# Patient Record
Sex: MALE | Race: WHITE | NOT HISPANIC OR LATINO
[De-identification: names, ages, dates, MRNs, and addresses within clinical notes are randomized per-mention and may not be internally consistent; named-entity substitution may affect disease eponyms.]

---

## 2017-03-19 ENCOUNTER — RESULT REVIEW (OUTPATIENT)
Age: 67
End: 2017-03-19

## 2017-11-27 ENCOUNTER — APPOINTMENT (OUTPATIENT)
Dept: UROLOGY | Facility: CLINIC | Age: 67
End: 2017-11-27
Payer: MEDICARE

## 2017-11-27 VITALS
HEIGHT: 78 IN | SYSTOLIC BLOOD PRESSURE: 146 MMHG | BODY MASS INDEX: 26.03 KG/M2 | WEIGHT: 225 LBS | DIASTOLIC BLOOD PRESSURE: 71 MMHG | OXYGEN SATURATION: 98 % | TEMPERATURE: 98.2 F | HEART RATE: 64 BPM

## 2017-11-27 PROCEDURE — 99213 OFFICE O/P EST LOW 20 MIN: CPT

## 2018-03-09 PROBLEM — Z80.0 FAMILY HISTORY OF MALIGNANT NEOPLASM OF STOMACH: Status: ACTIVE | Noted: 2018-03-09

## 2018-03-09 PROBLEM — E78.5 HYPERLIPIDEMIA: Status: ACTIVE | Noted: 2018-03-09

## 2018-03-09 PROBLEM — I10 HTN (HYPERTENSION): Status: ACTIVE | Noted: 2018-03-09

## 2018-03-09 PROBLEM — Z87.891 FORMER SMOKER: Status: ACTIVE | Noted: 2018-03-09

## 2018-03-09 PROBLEM — E11.9 DIABETES: Status: ACTIVE | Noted: 2018-03-09

## 2018-03-12 ENCOUNTER — APPOINTMENT (OUTPATIENT)
Dept: HEMATOLOGY ONCOLOGY | Facility: CLINIC | Age: 68
End: 2018-03-12

## 2018-03-12 ENCOUNTER — APPOINTMENT (OUTPATIENT)
Dept: HEMATOLOGY ONCOLOGY | Facility: CLINIC | Age: 68
End: 2018-03-12
Payer: MEDICARE

## 2018-03-12 DIAGNOSIS — E11.9 TYPE 2 DIABETES MELLITUS W/OUT COMPLICATIONS: ICD-10-CM

## 2018-03-12 DIAGNOSIS — Z87.891 PERSONAL HISTORY OF NICOTINE DEPENDENCE: ICD-10-CM

## 2018-03-12 DIAGNOSIS — I10 ESSENTIAL (PRIMARY) HYPERTENSION: ICD-10-CM

## 2018-03-12 DIAGNOSIS — Z80.0 FAMILY HISTORY OF MALIGNANT NEOPLASM OF DIGESTIVE ORGANS: ICD-10-CM

## 2018-03-12 DIAGNOSIS — E78.5 HYPERLIPIDEMIA, UNSPECIFIED: ICD-10-CM

## 2018-04-02 ENCOUNTER — APPOINTMENT (OUTPATIENT)
Dept: HEMATOLOGY ONCOLOGY | Facility: CLINIC | Age: 68
End: 2018-04-02
Payer: MEDICARE

## 2018-04-02 VITALS
HEIGHT: 78 IN | SYSTOLIC BLOOD PRESSURE: 151 MMHG | BODY MASS INDEX: 26.27 KG/M2 | WEIGHT: 227 LBS | HEART RATE: 74 BPM | TEMPERATURE: 98.2 F | DIASTOLIC BLOOD PRESSURE: 80 MMHG

## 2018-04-02 DIAGNOSIS — Z82.49 FAMILY HISTORY OF ISCHEMIC HEART DISEASE AND OTHER DISEASES OF THE CIRCULATORY SYSTEM: ICD-10-CM

## 2018-04-02 PROCEDURE — 99203 OFFICE O/P NEW LOW 30 MIN: CPT

## 2018-04-02 RX ORDER — ATORVASTATIN CALCIUM 20 MG/1
20 TABLET, FILM COATED ORAL
Refills: 0 | Status: ACTIVE | COMMUNITY
Start: 2018-04-02

## 2018-04-17 ENCOUNTER — APPOINTMENT (OUTPATIENT)
Dept: HEMATOLOGY ONCOLOGY | Facility: CLINIC | Age: 68
End: 2018-04-17
Payer: MEDICARE

## 2018-04-17 VITALS
BODY MASS INDEX: 26.38 KG/M2 | TEMPERATURE: 98.2 F | DIASTOLIC BLOOD PRESSURE: 83 MMHG | SYSTOLIC BLOOD PRESSURE: 133 MMHG | WEIGHT: 228 LBS | HEART RATE: 71 BPM | HEIGHT: 78 IN

## 2018-04-17 PROCEDURE — 99214 OFFICE O/P EST MOD 30 MIN: CPT

## 2018-04-30 ENCOUNTER — FORM ENCOUNTER (OUTPATIENT)
Age: 68
End: 2018-04-30

## 2018-05-01 ENCOUNTER — APPOINTMENT (OUTPATIENT)
Dept: ULTRASOUND IMAGING | Facility: CLINIC | Age: 68
End: 2018-05-01
Payer: MEDICARE

## 2018-05-01 ENCOUNTER — OUTPATIENT (OUTPATIENT)
Dept: OUTPATIENT SERVICES | Facility: HOSPITAL | Age: 68
LOS: 1 days | End: 2018-05-01
Payer: MEDICARE

## 2018-05-01 DIAGNOSIS — Z00.8 ENCOUNTER FOR OTHER GENERAL EXAMINATION: ICD-10-CM

## 2018-05-01 PROCEDURE — 93971 EXTREMITY STUDY: CPT

## 2018-05-01 PROCEDURE — 93971 EXTREMITY STUDY: CPT | Mod: 26,LT

## 2018-06-19 ENCOUNTER — LABORATORY RESULT (OUTPATIENT)
Age: 68
End: 2018-06-19

## 2018-06-19 ENCOUNTER — APPOINTMENT (OUTPATIENT)
Dept: HEMATOLOGY ONCOLOGY | Facility: CLINIC | Age: 68
End: 2018-06-19
Payer: MEDICARE

## 2018-06-19 VITALS
HEART RATE: 95 BPM | TEMPERATURE: 98 F | HEIGHT: 78 IN | WEIGHT: 222 LBS | SYSTOLIC BLOOD PRESSURE: 134 MMHG | DIASTOLIC BLOOD PRESSURE: 76 MMHG | BODY MASS INDEX: 25.69 KG/M2

## 2018-06-19 PROCEDURE — 36415 COLL VENOUS BLD VENIPUNCTURE: CPT

## 2018-06-20 LAB
DEPRECATED D DIMER PPP IA-ACNC: 304 NG/ML DDU
PROT S AG ACT/NOR PPP IA: 92 %

## 2018-06-21 LAB
B2 GLYCOPROT1 IGA SERPL IA-ACNC: <5 SAU
B2 GLYCOPROT1 IGG SER-ACNC: <5 SGU
CARDIOLIPIN IGM SER-MCNC: <5 GPL
CARDIOLIPIN IGM SER-MCNC: <5 MPL
PROT C AG PPP IA-ACNC: 62 %
PS IGA SER QL: <20 U/ML
PS IGG SER QL: <10 U/ML
PS IGM SER QL: <25 U/ML

## 2018-06-22 LAB — B2 GLYCOPROT1 IGM SER-ACNC: <5 SMU

## 2018-06-26 ENCOUNTER — APPOINTMENT (OUTPATIENT)
Dept: HEMATOLOGY ONCOLOGY | Facility: CLINIC | Age: 68
End: 2018-06-26
Payer: MEDICARE

## 2018-06-26 VITALS
HEIGHT: 78 IN | HEART RATE: 71 BPM | TEMPERATURE: 97.5 F | BODY MASS INDEX: 25.8 KG/M2 | SYSTOLIC BLOOD PRESSURE: 146 MMHG | DIASTOLIC BLOOD PRESSURE: 78 MMHG | WEIGHT: 223 LBS

## 2018-06-26 LAB — FACT X AG PPP IA-ACNC: 68 %

## 2018-06-26 PROCEDURE — 99215 OFFICE O/P EST HI 40 MIN: CPT

## 2018-06-26 RX ORDER — WARFARIN 6 MG/1
6 TABLET ORAL DAILY
Refills: 0 | Status: DISCONTINUED | COMMUNITY
Start: 2018-04-02 | End: 2018-06-26

## 2018-06-26 RX ORDER — DILTIAZEM HYDROCHLORIDE 300 MG/1
300 CAPSULE, COATED, EXTENDED RELEASE ORAL
Refills: 0 | Status: DISCONTINUED | COMMUNITY
Start: 2018-04-02 | End: 2018-06-26

## 2018-06-26 RX ORDER — AZITHROMYCIN 250 MG/1
250 TABLET, FILM COATED ORAL
Qty: 5 | Refills: 0 | Status: DISCONTINUED | COMMUNITY
Start: 2018-06-04

## 2018-06-26 RX ORDER — METFORMIN HYDROCHLORIDE 500 MG/1
500 TABLET, COATED ORAL
Refills: 0 | Status: DISCONTINUED | COMMUNITY
Start: 2018-04-02 | End: 2018-06-26

## 2018-11-19 ENCOUNTER — APPOINTMENT (OUTPATIENT)
Dept: UROLOGY | Facility: CLINIC | Age: 68
End: 2018-11-19
Payer: MEDICARE

## 2018-11-19 VITALS
BODY MASS INDEX: 25.45 KG/M2 | SYSTOLIC BLOOD PRESSURE: 127 MMHG | WEIGHT: 220 LBS | HEART RATE: 82 BPM | DIASTOLIC BLOOD PRESSURE: 77 MMHG | HEIGHT: 78 IN | TEMPERATURE: 98.3 F

## 2018-11-19 PROCEDURE — 99212 OFFICE O/P EST SF 10 MIN: CPT

## 2018-11-19 PROCEDURE — 99213 OFFICE O/P EST LOW 20 MIN: CPT

## 2018-11-27 ENCOUNTER — OUTPATIENT (OUTPATIENT)
Dept: OUTPATIENT SERVICES | Facility: HOSPITAL | Age: 68
LOS: 1 days | End: 2018-11-27

## 2018-11-27 DIAGNOSIS — Z00.8 ENCOUNTER FOR OTHER GENERAL EXAMINATION: ICD-10-CM

## 2018-11-29 ENCOUNTER — FORM ENCOUNTER (OUTPATIENT)
Age: 68
End: 2018-11-29

## 2018-11-30 ENCOUNTER — OUTPATIENT (OUTPATIENT)
Dept: OUTPATIENT SERVICES | Facility: HOSPITAL | Age: 68
LOS: 1 days | End: 2018-11-30
Payer: MEDICARE

## 2018-11-30 ENCOUNTER — APPOINTMENT (OUTPATIENT)
Dept: MRI IMAGING | Facility: CLINIC | Age: 68
End: 2018-11-30
Payer: MEDICARE

## 2018-11-30 DIAGNOSIS — Z00.8 ENCOUNTER FOR OTHER GENERAL EXAMINATION: ICD-10-CM

## 2018-11-30 PROCEDURE — 72197 MRI PELVIS W/O & W/DYE: CPT

## 2018-11-30 PROCEDURE — 74183 MRI ABD W/O CNTR FLWD CNTR: CPT | Mod: 26

## 2018-11-30 PROCEDURE — A9585: CPT

## 2018-11-30 PROCEDURE — 72197 MRI PELVIS W/O & W/DYE: CPT | Mod: 26

## 2018-11-30 PROCEDURE — 74183 MRI ABD W/O CNTR FLWD CNTR: CPT

## 2019-01-14 ENCOUNTER — APPOINTMENT (OUTPATIENT)
Dept: HEMATOLOGY ONCOLOGY | Facility: CLINIC | Age: 69
End: 2019-01-14
Payer: COMMERCIAL

## 2019-01-14 VITALS
DIASTOLIC BLOOD PRESSURE: 81 MMHG | BODY MASS INDEX: 25.22 KG/M2 | HEIGHT: 78 IN | SYSTOLIC BLOOD PRESSURE: 146 MMHG | HEART RATE: 73 BPM | TEMPERATURE: 97.6 F | WEIGHT: 218 LBS

## 2019-01-14 PROCEDURE — 99214 OFFICE O/P EST MOD 30 MIN: CPT

## 2019-01-14 RX ORDER — ELECTROLYTES/DEXTROSE
SOLUTION, ORAL ORAL DAILY
Refills: 0 | Status: ACTIVE | COMMUNITY
Start: 2019-01-14

## 2019-01-14 NOTE — ASSESSMENT
[FreeTextEntry1] : 68 y/o male with history of LLE DVT in 2003 in setting of flare up of UC/Crohn and  car travel. Family hx of PE in father ( details not available). \par \par Persistently low protein C and persistent elevated D dimer.\par Currently on indefinite AC - low dose Xarelto per [facility] Choice trial.\par No evidence of hepatitis or any other liver injury due to Xarelto.\par \par Long discussion with patient and his wife.\par Continue Xarelto.\par He has follow up with Dr Greene in February. \par \par RV in one year. \par \par

## 2019-01-14 NOTE — REASON FOR VISIT
[Follow-Up Visit] : a follow-up visit for [FreeTextEntry2] : hx of DVT,on indefinite anticoagulation with XArelto ( lower dose 10 mg)

## 2019-01-14 NOTE — HISTORY OF PRESENT ILLNESS
[de-identified] : Patient was diagnosed with LLE DVT in . He remains on AC with Coumadin since then. His INR is stable on stable dose of Coumadin 6.5 mg daily and he had no bleeding complications. He was referred here to evaluate if he should stop anticoagulation. \par At the time of diagnosis of DVT he had flare up of his ulcerative colitis, he was on high dose prednisone, developed  new onset diabetes, also had several hours car trip. He had thrombophilia w/up done - results are not available now ( old records not available) . Extended/possibly indefinite anticoagulation was recommended. Never had recurrence of thrombosis.\par He has chronic LLE swelling- mild, he uses compression stocking during plane flight. His father  of PE at age 74 ( unclear if unprovoked). \par \par His ulcerative colitis/ Crohn disease.\par \par Stopped Coumadin in 2018.\par Evaluated here in 2018. \par Persistently decreased  Protein C - after coumadin was discontinued.\par Protein C antigen level 62 % might indicate mild deficiency or represent lower end of normal distribution.  \par He also has elevated D -dimer- which  might indicate higher risk of recurrent VTE after AC is discontinued.\par \par Due to above factors his risk of recurrent thrombosis is probably increased. I recommended  to continue prophylactic anticoagulation. Extended/ indefinite anticoagulation with lower dose Xarelto 10 mg daily ( [address] CHOICE trial ) .  \par \par \par  [de-identified] : Had flare up of Crohn in Summer 2018. Well controlled now.\par He was told  that has enlarged liver and had blood work/ imaging. \par \par He is concerned that liver issues can be due to Xarelto. \par \par Overall feels well.

## 2019-01-14 NOTE — RESULTS/DATA
[FreeTextEntry1] : LFT's normal November 2018   MRI of abdomen ( by GI)- reviewed- no splenomegaly, liver incompletely imaged , simple cyst.

## 2019-01-14 NOTE — PHYSICAL EXAM
[Fully active, able to carry on all pre-disease performance without restriction] : Status 0 - Fully active, able to carry on all pre-disease performance without restriction [Normal] : well developed, well nourished, in no acute distress [de-identified] : mild LLE edema  chronic  [de-identified] : no overt hepatomegaly on exam

## 2019-04-24 ENCOUNTER — RECORD ABSTRACTING (OUTPATIENT)
Age: 69
End: 2019-04-24

## 2019-04-24 DIAGNOSIS — Z85.828 PERSONAL HISTORY OF OTHER MALIGNANT NEOPLASM OF SKIN: ICD-10-CM

## 2019-04-24 DIAGNOSIS — E78.00 PURE HYPERCHOLESTEROLEMIA, UNSPECIFIED: ICD-10-CM

## 2019-04-30 ENCOUNTER — APPOINTMENT (OUTPATIENT)
Dept: GASTROENTEROLOGY | Facility: CLINIC | Age: 69
End: 2019-04-30

## 2019-04-30 VITALS
SYSTOLIC BLOOD PRESSURE: 133 MMHG | HEIGHT: 78 IN | DIASTOLIC BLOOD PRESSURE: 81 MMHG | BODY MASS INDEX: 24.88 KG/M2 | TEMPERATURE: 97.6 F | WEIGHT: 215 LBS

## 2019-04-30 RX ORDER — METOPROLOL SUCCINATE 100 MG/1
100 TABLET, EXTENDED RELEASE ORAL
Refills: 0 | Status: DISCONTINUED | COMMUNITY
End: 2019-04-30

## 2019-04-30 RX ORDER — MESALAMINE 1.2 G/1
1.2 TABLET, DELAYED RELEASE ORAL DAILY
Refills: 0 | Status: DISCONTINUED | COMMUNITY
Start: 2018-04-02 | End: 2019-04-30

## 2019-04-30 RX ORDER — BUDESONIDE 3 MG/1
3 CAPSULE, COATED PELLETS ORAL
Refills: 0 | Status: DISCONTINUED | COMMUNITY
End: 2019-04-30

## 2019-04-30 RX ORDER — SITAGLIPTIN 100 MG/1
100 TABLET, FILM COATED ORAL DAILY
Refills: 0 | Status: DISCONTINUED | COMMUNITY
Start: 2018-04-02 | End: 2019-04-30

## 2019-04-30 RX ORDER — METFORMIN ER 500 MG 500 MG/1
500 TABLET ORAL
Qty: 180 | Refills: 0 | Status: DISCONTINUED | COMMUNITY
Start: 2018-06-04 | End: 2019-04-30

## 2019-04-30 NOTE — ASSESSMENT
[FreeTextEntry1] : crohns doing well on mesalamine and budesonide 2x daily will decrease to 1 daily. MRI no small bowel involvement

## 2019-05-06 ENCOUNTER — APPOINTMENT (OUTPATIENT)
Dept: UROLOGY | Facility: CLINIC | Age: 69
End: 2019-05-06
Payer: COMMERCIAL

## 2019-05-06 VITALS
WEIGHT: 215 LBS | DIASTOLIC BLOOD PRESSURE: 88 MMHG | RESPIRATION RATE: 16 BRPM | HEART RATE: 68 BPM | HEIGHT: 78 IN | SYSTOLIC BLOOD PRESSURE: 149 MMHG | OXYGEN SATURATION: 98 % | TEMPERATURE: 98.1 F | BODY MASS INDEX: 24.88 KG/M2

## 2019-05-06 DIAGNOSIS — Z78.9 OTHER SPECIFIED HEALTH STATUS: ICD-10-CM

## 2019-05-06 PROCEDURE — 99213 OFFICE O/P EST LOW 20 MIN: CPT

## 2019-05-07 LAB — URINE CYTOLOGY: NORMAL

## 2019-05-07 NOTE — PHYSICAL EXAM
[General Appearance - Well Developed] : well developed [Normal Appearance] : normal appearance [General Appearance - Well Nourished] : well nourished [Well Groomed] : well groomed [General Appearance - In No Acute Distress] : no acute distress [Abdomen Soft] : soft [Abdomen Tenderness] : non-tender [Costovertebral Angle Tenderness] : no ~M costovertebral angle tenderness [Urethral Meatus] : meatus normal [Urinary Bladder Findings] : the bladder was normal on palpation [Testes Mass (___cm)] : there were no testicular masses [Scrotum] : the scrotum was normal [Prostate Enlargement] : the prostate was not enlarged [Prostate Tenderness] : the prostate was not tender [No Prostate Nodules] : no prostate nodules [Edema] : no peripheral edema [] : no respiratory distress [Respiration, Rhythm And Depth] : normal respiratory rhythm and effort [Oriented To Time, Place, And Person] : oriented to person, place, and time [Exaggerated Use Of Accessory Muscles For Inspiration] : no accessory muscle use [Mood] : the mood was normal [Affect] : the affect was normal [Normal Station and Gait] : the gait and station were normal for the patient's age [Not Anxious] : not anxious [No Focal Deficits] : no focal deficits [No Palpable Adenopathy] : no palpable adenopathy

## 2019-05-07 NOTE — PHYSICAL EXAM
[General Appearance - Well Developed] : well developed [Normal Appearance] : normal appearance [General Appearance - Well Nourished] : well nourished [Well Groomed] : well groomed [Abdomen Soft] : soft [General Appearance - In No Acute Distress] : no acute distress [Abdomen Tenderness] : non-tender [Costovertebral Angle Tenderness] : no ~M costovertebral angle tenderness [Urinary Bladder Findings] : the bladder was normal on palpation [Urethral Meatus] : meatus normal [Testes Mass (___cm)] : there were no testicular masses [Scrotum] : the scrotum was normal [Prostate Enlargement] : the prostate was not enlarged [Prostate Tenderness] : the prostate was not tender [No Prostate Nodules] : no prostate nodules [] : no respiratory distress [Edema] : no peripheral edema [Respiration, Rhythm And Depth] : normal respiratory rhythm and effort [Oriented To Time, Place, And Person] : oriented to person, place, and time [Exaggerated Use Of Accessory Muscles For Inspiration] : no accessory muscle use [Affect] : the affect was normal [Mood] : the mood was normal [Not Anxious] : not anxious [Normal Station and Gait] : the gait and station were normal for the patient's age [No Focal Deficits] : no focal deficits [No Palpable Adenopathy] : no palpable adenopathy

## 2019-05-08 LAB
APPEARANCE: ABNORMAL
BACTERIA UR CULT: NORMAL
BACTERIA: ABNORMAL
BILIRUBIN URINE: NEGATIVE
BLOOD URINE: ABNORMAL
COLOR: ABNORMAL
GLUCOSE QUALITATIVE U: NEGATIVE
HYALINE CASTS: 0 /LPF
KETONES URINE: NEGATIVE
LEUKOCYTE ESTERASE URINE: ABNORMAL
MICROSCOPIC-UA: NORMAL
NITRITE URINE: NEGATIVE
PH URINE: 6
PROTEIN URINE: ABNORMAL
RED BLOOD CELLS URINE: >720 /HPF
SPECIFIC GRAVITY URINE: 1.02
SQUAMOUS EPITHELIAL CELLS: 3 /HPF
UROBILINOGEN URINE: NORMAL
WHITE BLOOD CELLS URINE: 12 /HPF

## 2019-05-11 NOTE — ASSESSMENT
[FreeTextEntry1] : Gross Hematuria will order a CT Urogram, PSA and send urine for evaluation.  He will follow up for cystoscopy in the coming weeks.

## 2019-05-17 ENCOUNTER — RX RENEWAL (OUTPATIENT)
Age: 69
End: 2019-05-17

## 2019-05-19 ENCOUNTER — FORM ENCOUNTER (OUTPATIENT)
Age: 69
End: 2019-05-19

## 2019-05-20 ENCOUNTER — EMERGENCY (EMERGENCY)
Facility: HOSPITAL | Age: 69
LOS: 0 days | Discharge: HOME | End: 2019-05-20
Attending: EMERGENCY MEDICINE | Admitting: EMERGENCY MEDICINE
Payer: COMMERCIAL

## 2019-05-20 ENCOUNTER — APPOINTMENT (OUTPATIENT)
Dept: CT IMAGING | Facility: CLINIC | Age: 69
End: 2019-05-20
Payer: COMMERCIAL

## 2019-05-20 ENCOUNTER — OUTPATIENT (OUTPATIENT)
Dept: OUTPATIENT SERVICES | Facility: HOSPITAL | Age: 69
LOS: 1 days | End: 2019-05-20
Payer: COMMERCIAL

## 2019-05-20 VITALS
RESPIRATION RATE: 16 BRPM | TEMPERATURE: 98 F | SYSTOLIC BLOOD PRESSURE: 125 MMHG | OXYGEN SATURATION: 96 % | DIASTOLIC BLOOD PRESSURE: 80 MMHG | HEART RATE: 92 BPM

## 2019-05-20 VITALS
RESPIRATION RATE: 16 BRPM | OXYGEN SATURATION: 98 % | TEMPERATURE: 98 F | HEART RATE: 84 BPM | DIASTOLIC BLOOD PRESSURE: 69 MMHG | SYSTOLIC BLOOD PRESSURE: 117 MMHG

## 2019-05-20 DIAGNOSIS — K59.09 OTHER CONSTIPATION: ICD-10-CM

## 2019-05-20 DIAGNOSIS — R14.0 ABDOMINAL DISTENSION (GASEOUS): ICD-10-CM

## 2019-05-20 DIAGNOSIS — I82.409 ACUTE EMBOLISM AND THROMBOSIS OF UNSPECIFIED DEEP VEINS OF UNSPECIFIED LOWER EXTREMITY: ICD-10-CM

## 2019-05-20 DIAGNOSIS — Z00.8 ENCOUNTER FOR OTHER GENERAL EXAMINATION: ICD-10-CM

## 2019-05-20 DIAGNOSIS — Z79.01 LONG TERM (CURRENT) USE OF ANTICOAGULANTS: ICD-10-CM

## 2019-05-20 DIAGNOSIS — E11.9 TYPE 2 DIABETES MELLITUS WITHOUT COMPLICATIONS: ICD-10-CM

## 2019-05-20 DIAGNOSIS — K50.10 CROHN'S DISEASE OF LARGE INTESTINE WITHOUT COMPLICATIONS: ICD-10-CM

## 2019-05-20 DIAGNOSIS — N13.4 HYDROURETER: ICD-10-CM

## 2019-05-20 DIAGNOSIS — Z79.52 LONG TERM (CURRENT) USE OF SYSTEMIC STEROIDS: ICD-10-CM

## 2019-05-20 LAB
ALBUMIN SERPL ELPH-MCNC: 3.8 G/DL — SIGNIFICANT CHANGE UP (ref 3.3–5)
ALP SERPL-CCNC: 59 U/L — SIGNIFICANT CHANGE UP (ref 40–120)
ALT FLD-CCNC: 28 U/L — SIGNIFICANT CHANGE UP (ref 12–78)
ANION GAP SERPL CALC-SCNC: 7 MMOL/L — SIGNIFICANT CHANGE UP (ref 5–17)
APPEARANCE UR: CLEAR — SIGNIFICANT CHANGE UP
AST SERPL-CCNC: 21 U/L — SIGNIFICANT CHANGE UP (ref 15–37)
BACTERIA # UR AUTO: ABNORMAL
BASOPHILS # BLD AUTO: 0.05 K/UL — SIGNIFICANT CHANGE UP (ref 0–0.2)
BASOPHILS NFR BLD AUTO: 0.4 % — SIGNIFICANT CHANGE UP (ref 0–2)
BILIRUB SERPL-MCNC: 1.1 MG/DL — SIGNIFICANT CHANGE UP (ref 0.2–1.2)
BILIRUB UR-MCNC: NEGATIVE — SIGNIFICANT CHANGE UP
BUN SERPL-MCNC: 29 MG/DL — HIGH (ref 7–23)
CALCIUM SERPL-MCNC: 9.4 MG/DL — SIGNIFICANT CHANGE UP (ref 8.5–10.1)
CHLORIDE SERPL-SCNC: 99 MMOL/L — SIGNIFICANT CHANGE UP (ref 96–108)
CO2 SERPL-SCNC: 28 MMOL/L — SIGNIFICANT CHANGE UP (ref 22–31)
COLOR SPEC: YELLOW — SIGNIFICANT CHANGE UP
CREAT SERPL-MCNC: 1.3 MG/DL — SIGNIFICANT CHANGE UP (ref 0.5–1.3)
DIFF PNL FLD: ABNORMAL
EOSINOPHIL # BLD AUTO: 0.02 K/UL — SIGNIFICANT CHANGE UP (ref 0–0.5)
EOSINOPHIL NFR BLD AUTO: 0.2 % — SIGNIFICANT CHANGE UP (ref 0–6)
EPI CELLS # UR: SIGNIFICANT CHANGE UP
GLUCOSE BLDC GLUCOMTR-MCNC: 89 MG/DL — SIGNIFICANT CHANGE UP (ref 70–99)
GLUCOSE SERPL-MCNC: 99 MG/DL — SIGNIFICANT CHANGE UP (ref 70–99)
GLUCOSE UR QL: NEGATIVE MG/DL — SIGNIFICANT CHANGE UP
HCT VFR BLD CALC: 48.3 % — SIGNIFICANT CHANGE UP (ref 39–50)
HGB BLD-MCNC: 16.6 G/DL — SIGNIFICANT CHANGE UP (ref 13–17)
IMM GRANULOCYTES NFR BLD AUTO: 0.4 % — SIGNIFICANT CHANGE UP (ref 0–1.5)
KETONES UR-MCNC: ABNORMAL
LACTATE SERPL-SCNC: 1 MMOL/L — SIGNIFICANT CHANGE UP (ref 0.7–2)
LEUKOCYTE ESTERASE UR-ACNC: NEGATIVE — SIGNIFICANT CHANGE UP
LYMPHOCYTES # BLD AUTO: 1.46 K/UL — SIGNIFICANT CHANGE UP (ref 1–3.3)
LYMPHOCYTES # BLD AUTO: 11.8 % — LOW (ref 13–44)
MANUAL SMEAR VERIFICATION: SIGNIFICANT CHANGE UP
MCHC RBC-ENTMCNC: 32.5 PG — SIGNIFICANT CHANGE UP (ref 27–34)
MCHC RBC-ENTMCNC: 34.4 GM/DL — SIGNIFICANT CHANGE UP (ref 32–36)
MCV RBC AUTO: 94.5 FL — SIGNIFICANT CHANGE UP (ref 80–100)
MONOCYTES # BLD AUTO: 1.62 K/UL — HIGH (ref 0–0.9)
MONOCYTES NFR BLD AUTO: 13.1 % — SIGNIFICANT CHANGE UP (ref 2–14)
NEUTROPHILS # BLD AUTO: 9.2 K/UL — HIGH (ref 1.8–7.4)
NEUTROPHILS NFR BLD AUTO: 74.1 % — SIGNIFICANT CHANGE UP (ref 43–77)
NITRITE UR-MCNC: NEGATIVE — SIGNIFICANT CHANGE UP
PH UR: 5 — SIGNIFICANT CHANGE UP (ref 5–8)
PLAT MORPH BLD: NORMAL — SIGNIFICANT CHANGE UP
PLATELET # BLD AUTO: 158 K/UL — SIGNIFICANT CHANGE UP (ref 150–400)
PLATELET COUNT - ESTIMATE: NORMAL — SIGNIFICANT CHANGE UP
POTASSIUM SERPL-MCNC: 3.8 MMOL/L — SIGNIFICANT CHANGE UP (ref 3.5–5.3)
POTASSIUM SERPL-SCNC: 3.8 MMOL/L — SIGNIFICANT CHANGE UP (ref 3.5–5.3)
PROT SERPL-MCNC: 7.1 GM/DL — SIGNIFICANT CHANGE UP (ref 6–8.3)
PROT UR-MCNC: 15 MG/DL
RBC # BLD: 5.11 M/UL — SIGNIFICANT CHANGE UP (ref 4.2–5.8)
RBC # FLD: 13.2 % — SIGNIFICANT CHANGE UP (ref 10.3–14.5)
RBC BLD AUTO: NORMAL — SIGNIFICANT CHANGE UP
RBC CASTS # UR COMP ASSIST: ABNORMAL /HPF (ref 0–4)
SODIUM SERPL-SCNC: 134 MMOL/L — LOW (ref 135–145)
SP GR SPEC: 1.01 — SIGNIFICANT CHANGE UP (ref 1.01–1.02)
UROBILINOGEN FLD QL: NEGATIVE MG/DL — SIGNIFICANT CHANGE UP
WBC # BLD: 12.4 K/UL — HIGH (ref 3.8–10.5)
WBC # FLD AUTO: 12.4 K/UL — HIGH (ref 3.8–10.5)
WBC UR QL: SIGNIFICANT CHANGE UP

## 2019-05-20 PROCEDURE — 74178 CT ABD&PLV WO CNTR FLWD CNTR: CPT

## 2019-05-20 PROCEDURE — 74177 CT ABD & PELVIS W/CONTRAST: CPT | Mod: 26

## 2019-05-20 PROCEDURE — 99285 EMERGENCY DEPT VISIT HI MDM: CPT

## 2019-05-20 PROCEDURE — 74178 CT ABD&PLV WO CNTR FLWD CNTR: CPT | Mod: 26

## 2019-05-20 RX ORDER — SODIUM CHLORIDE 9 MG/ML
1000 INJECTION INTRAMUSCULAR; INTRAVENOUS; SUBCUTANEOUS ONCE
Refills: 0 | Status: COMPLETED | OUTPATIENT
Start: 2019-05-20 | End: 2019-05-20

## 2019-05-20 RX ADMIN — SODIUM CHLORIDE 1000 MILLILITER(S): 9 INJECTION INTRAMUSCULAR; INTRAVENOUS; SUBCUTANEOUS at 20:29

## 2019-05-20 RX ADMIN — SODIUM CHLORIDE 1000 MILLILITER(S): 9 INJECTION INTRAMUSCULAR; INTRAVENOUS; SUBCUTANEOUS at 18:47

## 2019-05-20 NOTE — ED STATDOCS - CLINICAL SUMMARY MEDICAL DECISION MAKING FREE TEXT BOX
Pt presenting to r/o bowel obstruction with constipation and abd pian x several days, will r/o fecal impaction, labs, CT scan. Pt presenting to r/o bowel obstruction with constipation and abd pian x several days, will r/o fecal impaction, labs, CT scan. Labs and CT report no acute findings. Pt had bowel movement while in ED. Tolerating PO. Plan for d.c with outpatient GI follow up.

## 2019-05-20 NOTE — ED ADULT NURSE NOTE - NSIMPLEMENTINTERV_GEN_ALL_ED
Implemented All Universal Safety Interventions:  [place] to call system. Call bell, personal items and telephone within reach. Instruct patient to call for assistance. Room bathroom lighting operational. Non-slip footwear when patient is off stretcher. Physically safe environment: no spills, clutter or unnecessary equipment. Stretcher in lowest position, wheels locked, appropriate side rails in place.

## 2019-05-20 NOTE — ED ADULT NURSE NOTE - OBJECTIVE STATEMENT
Patient complains of discomfort pressure on left abdomen.  He was sent by his doctor.  Patient states he has not had a bowel movement since Friday 5/17. Denies nausea but states he has been having brown looking urine.

## 2019-05-20 NOTE — ED STATDOCS - OBJECTIVE STATEMENT
70 y/o male with a PMHx of DM , HTN, Crohn's (on steroids), hernia, DVT on Xarelto presents to the ED sent by GI Dr. Joshi for constipation and abdominal pain. Pt reports he has not had a BM in 4 days which is not at his baseline, normally has BM each day. Pt used two suppositories today with no relief. Denies vomiting, fevers, trauma to abdomen, or recent dysuria. Pt also reports associated LLQ abdominal pain and gradually worsening distention. Pt passing very little gas. Pt had CT urogram this morning due to intermittent "brown urine" x3 weeks, which has self resolved, being followed by urologist Dr. Ott. Pt has surgical hx of hernia surgery. Now comes to ED sent by GI with concern for SBO.

## 2019-05-20 NOTE — ED ADULT TRIAGE NOTE - CHIEF COMPLAINT QUOTE
Patient presents sent in by Dr Greene to rule out obstruction of bowel. Reports abdominal discomfort and no bowel movement for past 3 days. Patient reports urine is brown

## 2019-05-20 NOTE — ED STATDOCS - CARE PROVIDER_API CALL
Joaquín Greene)  Gastroenterology; Internal Medicine  [address] [place] [address]  Phone: [phone]  Fax: [phone]  Follow Up Time:

## 2019-05-20 NOTE — ED STATDOCS - PROGRESS NOTE DETAILS
70 y/o M 68 y/o M with PMh of HTN, Crohn's, DVT on xarelto, DM presents with constipation x 4 days. States he generally has BM daily. Reports passing "a little bit" of gas. No change in constipation with suppositories. +History of hernia repair. States he was advised by his GI to come to ED to r/o SBO. Pt reports no fever, chills, nausea, vomiting, CP, SOB. PE: Well appearing. Cardiac: s1s2, RRR. Lungs: CTAB. Abdomen: Mild distension. NBS x4, soft, nontender. No CVAT. Rectal. Adequate rectal tone. +external hemorrhoid. 68 y/o M with PMh of HTN, Crohn's, DVT on xarelto, DM presents with constipation x 4 days. States he generally has BM daily. Reports passing "a little bit" of gas. No change in constipation with suppositories. +History of hernia repair. States he was advised by his GI to come to ED to r/o SBO. Pt reports no fever, chills, nausea, vomiting, CP, SOB. PE: Well appearing. Cardiac: s1s2, RRR. Lungs: CTAB. Abdomen: Mild distension. NBS x4, soft, nontender. No CVAT. Rectal. Adequate rectal tone. +external hemorrhoid. No stool in rectum. A/P: Concern for SBO. plan for labs, IVF, CTAP, analgesia, reassess. - Venancio Rendon PA-C D/w Dr. Greene, concern for colovesical fistula, SBO. Recommended for admission following CT. - Venancio Rendon PA-C pt had bowel movement while in ED. - Venancio Rendon PA-C CT unremarkable. Plan for d/c with outpatient GI follow up. - Venancio Rendon PA-C

## 2019-05-21 ENCOUNTER — APPOINTMENT (OUTPATIENT)
Dept: UROLOGY | Facility: CLINIC | Age: 69
End: 2019-05-21

## 2019-05-21 ENCOUNTER — FORM ENCOUNTER (OUTPATIENT)
Age: 69
End: 2019-05-21

## 2019-05-22 ENCOUNTER — APPOINTMENT (OUTPATIENT)
Dept: RADIOLOGY | Facility: CLINIC | Age: 69
End: 2019-05-22
Payer: COMMERCIAL

## 2019-05-22 ENCOUNTER — OUTPATIENT (OUTPATIENT)
Dept: OUTPATIENT SERVICES | Facility: HOSPITAL | Age: 69
LOS: 1 days | End: 2019-05-22
Payer: COMMERCIAL

## 2019-05-22 DIAGNOSIS — Z00.8 ENCOUNTER FOR OTHER GENERAL EXAMINATION: ICD-10-CM

## 2019-05-22 PROBLEM — I10 ESSENTIAL (PRIMARY) HYPERTENSION: Chronic | Status: ACTIVE | Noted: 2019-05-21

## 2019-05-22 PROBLEM — K50.90 CROHN'S DISEASE, UNSPECIFIED, WITHOUT COMPLICATIONS: Chronic | Status: ACTIVE | Noted: 2019-05-21

## 2019-05-22 PROBLEM — I82.409 ACUTE EMBOLISM AND THROMBOSIS OF UNSPECIFIED DEEP VEINS OF UNSPECIFIED LOWER EXTREMITY: Chronic | Status: ACTIVE | Noted: 2019-05-21

## 2019-05-22 LAB
CULTURE RESULTS: NO GROWTH — SIGNIFICANT CHANGE UP
SPECIMEN SOURCE: SIGNIFICANT CHANGE UP

## 2019-05-22 PROCEDURE — 74018 RADEX ABDOMEN 1 VIEW: CPT | Mod: 26

## 2019-05-22 PROCEDURE — 74018 RADEX ABDOMEN 1 VIEW: CPT

## 2019-05-26 LAB
CULTURE RESULTS: SIGNIFICANT CHANGE UP
CULTURE RESULTS: SIGNIFICANT CHANGE UP
SPECIMEN SOURCE: SIGNIFICANT CHANGE UP
SPECIMEN SOURCE: SIGNIFICANT CHANGE UP

## 2019-05-29 ENCOUNTER — APPOINTMENT (OUTPATIENT)
Dept: UROLOGY | Facility: CLINIC | Age: 69
End: 2019-05-29
Payer: COMMERCIAL

## 2019-05-29 ENCOUNTER — APPOINTMENT (OUTPATIENT)
Dept: HEMATOLOGY ONCOLOGY | Facility: CLINIC | Age: 69
End: 2019-05-29
Payer: COMMERCIAL

## 2019-05-29 VITALS
SYSTOLIC BLOOD PRESSURE: 125 MMHG | HEIGHT: 78 IN | WEIGHT: 215 LBS | OXYGEN SATURATION: 98 % | TEMPERATURE: 98 F | BODY MASS INDEX: 24.88 KG/M2 | DIASTOLIC BLOOD PRESSURE: 76 MMHG | HEART RATE: 79 BPM

## 2019-05-29 VITALS — TEMPERATURE: 98.3 F | DIASTOLIC BLOOD PRESSURE: 87 MMHG | HEART RATE: 81 BPM | SYSTOLIC BLOOD PRESSURE: 134 MMHG

## 2019-05-29 PROCEDURE — 36415 COLL VENOUS BLD VENIPUNCTURE: CPT

## 2019-05-29 PROCEDURE — 52000 CYSTOURETHROSCOPY: CPT

## 2019-05-29 PROCEDURE — 99213 OFFICE O/P EST LOW 20 MIN: CPT | Mod: 25

## 2019-05-31 NOTE — HISTORY OF PRESENT ILLNESS
[FreeTextEntry1] : This patient feels much better but his KUB x-ray shows what appears to be the same ureteral stone.

## 2019-05-31 NOTE — PHYSICAL EXAM
[General Appearance - Well Developed] : well developed [General Appearance - Well Nourished] : well nourished [Normal Appearance] : normal appearance [Well Groomed] : well groomed [General Appearance - In No Acute Distress] : no acute distress [Abdomen Soft] : soft [Abdomen Tenderness] : non-tender [Costovertebral Angle Tenderness] : no ~M costovertebral angle tenderness [Urethral Meatus] : meatus normal [Urinary Bladder Findings] : the bladder was normal on palpation [Scrotum] : the scrotum was normal [Testes Mass (___cm)] : there were no testicular masses [Prostate Enlargement] : the prostate was not enlarged [Prostate Tenderness] : the prostate was not tender [No Prostate Nodules] : no prostate nodules [Edema] : no peripheral edema [] : no respiratory distress [Respiration, Rhythm And Depth] : normal respiratory rhythm and effort [Exaggerated Use Of Accessory Muscles For Inspiration] : no accessory muscle use [Oriented To Time, Place, And Person] : oriented to person, place, and time [Affect] : the affect was normal [Mood] : the mood was normal [Not Anxious] : not anxious [Normal Station and Gait] : the gait and station were normal for the patient's age [No Focal Deficits] : no focal deficits [No Palpable Adenopathy] : no palpable adenopathy

## 2019-06-03 ENCOUNTER — FORM ENCOUNTER (OUTPATIENT)
Age: 69
End: 2019-06-03

## 2019-06-03 ENCOUNTER — APPOINTMENT (OUTPATIENT)
Dept: UROLOGY | Facility: CLINIC | Age: 69
End: 2019-06-03

## 2019-06-04 ENCOUNTER — OUTPATIENT (OUTPATIENT)
Dept: OUTPATIENT SERVICES | Facility: HOSPITAL | Age: 69
LOS: 1 days | End: 2019-06-04
Payer: COMMERCIAL

## 2019-06-04 ENCOUNTER — APPOINTMENT (OUTPATIENT)
Dept: ULTRASOUND IMAGING | Facility: CLINIC | Age: 69
End: 2019-06-04
Payer: COMMERCIAL

## 2019-06-04 ENCOUNTER — APPOINTMENT (OUTPATIENT)
Dept: RADIOLOGY | Facility: CLINIC | Age: 69
End: 2019-06-04
Payer: COMMERCIAL

## 2019-06-04 DIAGNOSIS — Z00.8 ENCOUNTER FOR OTHER GENERAL EXAMINATION: ICD-10-CM

## 2019-06-04 PROCEDURE — 76775 US EXAM ABDO BACK WALL LIM: CPT

## 2019-06-04 PROCEDURE — 76775 US EXAM ABDO BACK WALL LIM: CPT | Mod: 26

## 2019-06-07 ENCOUNTER — CHART COPY (OUTPATIENT)
Age: 69
End: 2019-06-07

## 2019-06-10 ENCOUNTER — APPOINTMENT (OUTPATIENT)
Dept: UROLOGY | Facility: CLINIC | Age: 69
End: 2019-06-10

## 2019-06-24 LAB
ANION GAP SERPL CALC-SCNC: 15 MMOL/L
BUN SERPL-MCNC: 19 MG/DL
CALCIUM SERPL-MCNC: 9.7 MG/DL
CHLORIDE SERPL-SCNC: 102 MMOL/L
CO2 SERPL-SCNC: 23 MMOL/L
CREAT SERPL-MCNC: 0.89 MG/DL
GLUCOSE SERPL-MCNC: 128 MG/DL
POTASSIUM SERPL-SCNC: 4.6 MMOL/L
SODIUM SERPL-SCNC: 140 MMOL/L

## 2019-07-22 ENCOUNTER — APPOINTMENT (OUTPATIENT)
Dept: HEMATOLOGY ONCOLOGY | Facility: CLINIC | Age: 69
End: 2019-07-22
Payer: COMMERCIAL

## 2019-07-22 VITALS
DIASTOLIC BLOOD PRESSURE: 89 MMHG | SYSTOLIC BLOOD PRESSURE: 149 MMHG | BODY MASS INDEX: 23.95 KG/M2 | TEMPERATURE: 98 F | RESPIRATION RATE: 18 BRPM | HEART RATE: 84 BPM | HEIGHT: 78 IN | WEIGHT: 207 LBS

## 2019-07-22 PROCEDURE — 99214 OFFICE O/P EST MOD 30 MIN: CPT

## 2019-07-22 RX ORDER — AMLODIPINE BESYLATE 10 MG/1
10 TABLET ORAL DAILY
Refills: 0 | Status: ACTIVE | COMMUNITY
Start: 2019-07-22

## 2019-07-22 RX ORDER — DILTIAZEM HYDROCHLORIDE 300 MG/1
300 CAPSULE, EXTENDED RELEASE ORAL
Qty: 90 | Refills: 0 | Status: DISCONTINUED | COMMUNITY
Start: 2018-06-04 | End: 2019-07-22

## 2019-07-22 RX ORDER — SITAGLIPTIN 100 MG/1
100 TABLET, FILM COATED ORAL DAILY
Refills: 0 | Status: ACTIVE | COMMUNITY
Start: 2019-07-22

## 2019-07-22 NOTE — ASSESSMENT
[FreeTextEntry1] : 70 y/o male with history of LLE DVT in 2003 in setting of flare up of UC/Crohn and  car travel. Family hx of PE in father ( details not available). \par \par Persistently low protein C and persistent elevated D dimer. We discussed pro's and con's of  anticoagulation and patient preferred indefinitie AC - uncomfortable with small risk of recurrent VTE.\par Currently on indefinite AC - low dose Xarelto per [facility] Choice trial.\par \par Bruising- most likely due to skin  fragility due to chronic steroid use.\par No clinical indications to discontinue  anticoagulation.\par Again discussed controversy re: duration of AC .\par Patient will continue low dose Xarelto.\par \par RV in one year/ PRN. \par \par

## 2019-07-22 NOTE — PHYSICAL EXAM
[Fully active, able to carry on all pre-disease performance without restriction] : Status 0 - Fully active, able to carry on all pre-disease performance without restriction [Normal] : well developed, well nourished, in no acute distress [de-identified] : mild LLE edema  chronic  [de-identified] : forearms bilat several small ecchymoses

## 2019-07-22 NOTE — REVIEW OF SYSTEMS
[Patient Intake Form Reviewed] : Patient intake form was reviewed [Negative] : Endocrine [Easy Bruising] : a tendency for easy bruising

## 2019-07-22 NOTE — HISTORY OF PRESENT ILLNESS
[de-identified] : Patient was diagnosed with LLE DVT in . He remains on AC with Coumadin since then. His INR is stable on stable dose of Coumadin 6.5 mg daily and he had no bleeding complications. He was referred here to evaluate if he should stop anticoagulation. \par At the time of diagnosis of DVT he had flare up of his ulcerative colitis, he was on high dose prednisone, developed  new onset diabetes, also had several hours car trip. He had thrombophilia w/up done - results are not available now ( old records not available) . Extended/possibly indefinite anticoagulation was recommended. Never had recurrence of thrombosis.\par He has chronic LLE swelling- mild, he uses compression stocking during plane flight. His father  of PE at age 74 ( unclear if unprovoked). \par \par His ulcerative colitis/ Crohn disease.\par \par Stopped Coumadin in 2018.\par Evaluated here in 2018. \par Persistently decreased  Protein C - after coumadin was discontinued.\par Protein C antigen level 62 % might indicate mild deficiency or represent lower end of normal distribution.  \par He also has elevated D -dimer- which  might indicate higher risk of recurrent VTE after AC is discontinued.\par \par Due to above factors his risk of recurrent thrombosis is probably increased. I recommended  to continue prophylactic anticoagulation. Extended/ indefinite anticoagulation with lower dose Xarelto 10 mg daily ( [address] CHOICE trial ) .  \par \par \par  [de-identified] : Recently- had large bruises - forearms/ hands. Noticed- skin has less elasticity and will bruise with  minimal touch. He is on chronic steroids for Crohn. Bruising is less since steroid dose lowered. \par Otherwise feels well.

## 2019-07-23 ENCOUNTER — APPOINTMENT (OUTPATIENT)
Dept: GASTROENTEROLOGY | Facility: CLINIC | Age: 69
End: 2019-07-23
Payer: COMMERCIAL

## 2019-07-23 VITALS
WEIGHT: 208 LBS | DIASTOLIC BLOOD PRESSURE: 81 MMHG | HEIGHT: 78 IN | SYSTOLIC BLOOD PRESSURE: 132 MMHG | HEART RATE: 66 BPM | BODY MASS INDEX: 24.07 KG/M2

## 2019-07-23 PROCEDURE — 99213 OFFICE O/P EST LOW 20 MIN: CPT

## 2019-07-23 NOTE — PHYSICAL EXAM
[General Appearance - Alert] : alert [General Appearance - In No Acute Distress] : in no acute distress [Sclera] : the sclera and conjunctiva were normal [Oropharynx] : the oropharynx was normal [Outer Ear] : the ears and nose were normal in appearance [Extraocular Movements] : extraocular movements were intact [PERRL With Normal Accommodation] : pupils were equal in size, round, and reactive to light [Neck Cervical Mass (___cm)] : no neck mass was observed [Neck Appearance] : the appearance of the neck was normal [Jugular Venous Distention Increased] : there was no jugular-venous distention [Thyroid Nodule] : there were no palpable thyroid nodules [Thyroid Diffuse Enlargement] : the thyroid was not enlarged [Heart Sounds] : normal S1 and S2 [Auscultation Breath Sounds / Voice Sounds] : lungs were clear to auscultation bilaterally [Heart Sounds Gallop] : no gallops [Heart Rate And Rhythm] : heart rate was normal and rhythm regular [Murmurs] : no murmurs [Heart Sounds Pericardial Friction Rub] : no pericardial rub [Bowel Sounds] : normal bowel sounds [Abdomen Soft] : soft [Abdomen Tenderness] : non-tender [] : no hepato-splenomegaly [Abdomen Mass (___ Cm)] : no abdominal mass palpated

## 2019-07-23 NOTE — ASSESSMENT
[FreeTextEntry1] : Recent chronic constipation resolved on budesonide 3 mg every other day stable Crohn's disease we'll continue the same regimen

## 2019-07-23 NOTE — HISTORY OF PRESENT ILLNESS
[FreeTextEntry1] : Patient was recently here for her left lower quadrant pain with no bowel movement since emergency room and found to have kidney stones and chronic constipation once he moves his bowels the pain resolved he is now on budesonide 3 mg every other day and mesalamine 1.2 g b.i.d. and doing quite well

## 2019-08-28 ENCOUNTER — RX RENEWAL (OUTPATIENT)
Age: 69
End: 2019-08-28

## 2019-11-04 ENCOUNTER — RX RENEWAL (OUTPATIENT)
Age: 69
End: 2019-11-04

## 2019-11-05 ENCOUNTER — RX RENEWAL (OUTPATIENT)
Age: 69
End: 2019-11-05

## 2019-12-02 ENCOUNTER — RX RENEWAL (OUTPATIENT)
Age: 69
End: 2019-12-02

## 2020-01-02 ENCOUNTER — OTHER (OUTPATIENT)
Age: 70
End: 2020-01-02

## 2020-01-07 ENCOUNTER — APPOINTMENT (OUTPATIENT)
Dept: GASTROENTEROLOGY | Facility: CLINIC | Age: 70
End: 2020-01-07
Payer: COMMERCIAL

## 2020-01-07 VITALS
WEIGHT: 205 LBS | DIASTOLIC BLOOD PRESSURE: 76 MMHG | HEIGHT: 78 IN | SYSTOLIC BLOOD PRESSURE: 132 MMHG | HEART RATE: 75 BPM | BODY MASS INDEX: 23.72 KG/M2

## 2020-01-07 PROCEDURE — 99214 OFFICE O/P EST MOD 30 MIN: CPT

## 2020-01-07 NOTE — PHYSICAL EXAM
[Sclera] : the sclera and conjunctiva were normal [General Appearance - In No Acute Distress] : in no acute distress [General Appearance - Alert] : alert [PERRL With Normal Accommodation] : pupils were equal in size, round, and reactive to light [Extraocular Movements] : extraocular movements were intact [Oropharynx] : the oropharynx was normal [Outer Ear] : the ears and nose were normal in appearance [Neck Appearance] : the appearance of the neck was normal [Neck Cervical Mass (___cm)] : no neck mass was observed [Jugular Venous Distention Increased] : there was no jugular-venous distention [Thyroid Diffuse Enlargement] : the thyroid was not enlarged [Thyroid Nodule] : there were no palpable thyroid nodules [Auscultation Breath Sounds / Voice Sounds] : lungs were clear to auscultation bilaterally [Heart Sounds] : normal S1 and S2 [Heart Rate And Rhythm] : heart rate was normal and rhythm regular [Heart Sounds Gallop] : no gallops [Murmurs] : no murmurs [Bowel Sounds] : normal bowel sounds [Heart Sounds Pericardial Friction Rub] : no pericardial rub [Abdomen Tenderness] : non-tender [Abdomen Soft] : soft [] : no hepato-splenomegaly [Abdomen Mass (___ Cm)] : no abdominal mass palpated

## 2020-01-07 NOTE — HISTORY OF PRESENT ILLNESS
[FreeTextEntry1] : The patient has had several weeks of rectal bleeding and frequent bowel movements no rectal pain no cramps no weight loss he was placed on budesonide 3 mg tablets 3 of them at one time with minimal change in his symptoms he was recently placed on prednisone 60 mg daily which has resulted in marked improvement his last colonoscopy was in 2017

## 2020-01-07 NOTE — ASSESSMENT
[FreeTextEntry1] : Diarrhea with rectal bleeding likely representing a Crohn's flare will continue prednisone 40 mg for one more week schedule a colonoscopy

## 2020-01-13 ENCOUNTER — APPOINTMENT (OUTPATIENT)
Dept: GASTROENTEROLOGY | Facility: AMBULATORY MEDICAL SERVICES | Age: 70
End: 2020-01-13
Payer: COMMERCIAL

## 2020-01-13 ENCOUNTER — RESULT REVIEW (OUTPATIENT)
Age: 70
End: 2020-01-13

## 2020-01-13 PROCEDURE — 45380 COLONOSCOPY AND BIOPSY: CPT

## 2020-01-15 ENCOUNTER — MOBILE ON CALL (OUTPATIENT)
Age: 70
End: 2020-01-15

## 2020-02-18 ENCOUNTER — APPOINTMENT (OUTPATIENT)
Dept: GASTROENTEROLOGY | Facility: CLINIC | Age: 70
End: 2020-02-18
Payer: COMMERCIAL

## 2020-02-18 VITALS
DIASTOLIC BLOOD PRESSURE: 78 MMHG | HEIGHT: 78 IN | SYSTOLIC BLOOD PRESSURE: 134 MMHG | HEART RATE: 86 BPM | WEIGHT: 200 LBS | BODY MASS INDEX: 23.14 KG/M2

## 2020-02-18 PROCEDURE — 99212 OFFICE O/P EST SF 10 MIN: CPT

## 2020-02-18 NOTE — HISTORY OF PRESENT ILLNESS
[FreeTextEntry1] : Patient has been on budesonide 9 mg each morning doing quite well. No diarrhea no constipation. No cramps no bleeding no joint pain. He does note some skin fragility

## 2020-02-18 NOTE — PHYSICAL EXAM
[General Appearance - Alert] : alert [General Appearance - In No Acute Distress] : in no acute distress [Sclera] : the sclera and conjunctiva were normal [Extraocular Movements] : extraocular movements were intact [PERRL With Normal Accommodation] : pupils were equal in size, round, and reactive to light [Outer Ear] : the ears and nose were normal in appearance [Neck Appearance] : the appearance of the neck was normal [Neck Cervical Mass (___cm)] : no neck mass was observed [Oropharynx] : the oropharynx was normal [Jugular Venous Distention Increased] : there was no jugular-venous distention [Thyroid Nodule] : there were no palpable thyroid nodules [Thyroid Diffuse Enlargement] : the thyroid was not enlarged [Auscultation Breath Sounds / Voice Sounds] : lungs were clear to auscultation bilaterally [Heart Rate And Rhythm] : heart rate was normal and rhythm regular [Heart Sounds] : normal S1 and S2 [Heart Sounds Gallop] : no gallops [Murmurs] : no murmurs [Heart Sounds Pericardial Friction Rub] : no pericardial rub [Abdomen Tenderness] : non-tender [Bowel Sounds] : normal bowel sounds [Abdomen Soft] : soft [Cervical Lymph Nodes Enlarged Posterior Bilaterally] : posterior cervical [Cervical Lymph Nodes Enlarged Anterior Bilaterally] : anterior cervical [Abdomen Mass (___ Cm)] : no abdominal mass palpated [] : no hepato-splenomegaly [Axillary Lymph Nodes Enlarged Bilaterally] : axillary [Supraclavicular Lymph Nodes Enlarged Bilaterally] : supraclavicular [Inguinal Lymph Nodes Enlarged Bilaterally] : inguinal [No CVA Tenderness] : no ~M costovertebral angle tenderness [Femoral Lymph Nodes Enlarged Bilaterally] : femoral [Nail Clubbing] : no clubbing  or cyanosis of the fingernails [No Spinal Tenderness] : no spinal tenderness [Abnormal Walk] : normal gait [Musculoskeletal - Swelling] : no joint swelling seen [Motor Tone] : muscle strength and tone were normal [FreeTextEntry1] : Dry skin with some ecchymoses

## 2020-02-18 NOTE — ASSESSMENT
[FreeTextEntry1] : Crohn's disease, doing quite well on Entocort 9 mg, daily. We'll decrease to 6 mg for 6 weeks

## 2020-03-31 ENCOUNTER — APPOINTMENT (OUTPATIENT)
Dept: GASTROENTEROLOGY | Facility: CLINIC | Age: 70
End: 2020-03-31

## 2020-08-31 ENCOUNTER — APPOINTMENT (OUTPATIENT)
Dept: UROLOGY | Facility: CLINIC | Age: 70
End: 2020-08-31

## 2020-09-01 ENCOUNTER — APPOINTMENT (OUTPATIENT)
Dept: UROLOGY | Facility: CLINIC | Age: 70
End: 2020-09-01
Payer: COMMERCIAL

## 2020-09-01 ENCOUNTER — APPOINTMENT (OUTPATIENT)
Dept: ULTRASOUND IMAGING | Facility: CLINIC | Age: 70
End: 2020-09-01

## 2020-09-01 VITALS
TEMPERATURE: 97.9 F | HEART RATE: 84 BPM | DIASTOLIC BLOOD PRESSURE: 72 MMHG | BODY MASS INDEX: 25.45 KG/M2 | OXYGEN SATURATION: 98 % | WEIGHT: 220 LBS | HEIGHT: 78 IN | SYSTOLIC BLOOD PRESSURE: 111 MMHG

## 2020-09-01 PROCEDURE — 99213 OFFICE O/P EST LOW 20 MIN: CPT

## 2020-09-01 NOTE — HISTORY OF PRESENT ILLNESS
[FreeTextEntry1] : This patient presents with a 2-day history of right groin pain.  He feels that he has a protrusion in this area.

## 2020-09-01 NOTE — PHYSICAL EXAM
[General Appearance - Well Developed] : well developed [General Appearance - Well Nourished] : well nourished [Normal Appearance] : normal appearance [Well Groomed] : well groomed [General Appearance - In No Acute Distress] : no acute distress [Abdomen Soft] : soft [Abdomen Tenderness] : non-tender [Costovertebral Angle Tenderness] : no ~M costovertebral angle tenderness [Urethral Meatus] : meatus normal [Urinary Bladder Findings] : the bladder was normal on palpation [Scrotum] : the scrotum was normal [Testes Mass (___cm)] : there were no testicular masses [Prostate Enlargement] : the prostate was not enlarged [Prostate Tenderness] : the prostate was not tender [No Prostate Nodules] : no prostate nodules [FreeTextEntry1] : Prostate is palpably benign and moderate in size.  Atypical broad-based right inguinal hernia is noted which is not into the scrotum at this time [Edema] : no peripheral edema [] : no respiratory distress [Respiration, Rhythm And Depth] : normal respiratory rhythm and effort [Exaggerated Use Of Accessory Muscles For Inspiration] : no accessory muscle use [Oriented To Time, Place, And Person] : oriented to person, place, and time [Affect] : the affect was normal [Mood] : the mood was normal [Not Anxious] : not anxious [Normal Station and Gait] : the gait and station were normal for the patient's age [No Focal Deficits] : no focal deficits [No Palpable Adenopathy] : no palpable adenopathy

## 2020-09-01 NOTE — END OF VISIT
[FreeTextEntry3] : I recommended that he see Dr. Isaac Solis of our department of surgery.  His usual studies and a renal sonogram are ordered as well

## 2020-09-02 LAB
APPEARANCE: CLEAR
BACTERIA UR CULT: NORMAL
BACTERIA: NEGATIVE
BILIRUBIN URINE: NEGATIVE
BLOOD URINE: NEGATIVE
CALCIUM OXALATE CRYSTALS: ABNORMAL
COLOR: NORMAL
GLUCOSE QUALITATIVE U: NEGATIVE
HYALINE CASTS: 0 /LPF
KETONES URINE: NEGATIVE
LEUKOCYTE ESTERASE URINE: NEGATIVE
MICROSCOPIC-UA: NORMAL
NITRITE URINE: NEGATIVE
PH URINE: 6.5
PROTEIN URINE: NORMAL
RED BLOOD CELLS URINE: 2 /HPF
SPECIFIC GRAVITY URINE: 1.02
SQUAMOUS EPITHELIAL CELLS: 0 /HPF
UROBILINOGEN URINE: NORMAL
WHITE BLOOD CELLS URINE: 1 /HPF

## 2020-09-03 LAB — URINE CYTOLOGY: NORMAL

## 2020-09-05 ENCOUNTER — OUTPATIENT (OUTPATIENT)
Dept: OUTPATIENT SERVICES | Facility: HOSPITAL | Age: 70
LOS: 1 days | End: 2020-09-05
Payer: COMMERCIAL

## 2020-09-05 ENCOUNTER — APPOINTMENT (OUTPATIENT)
Dept: ULTRASOUND IMAGING | Facility: CLINIC | Age: 70
End: 2020-09-05
Payer: COMMERCIAL

## 2020-09-05 DIAGNOSIS — Z00.8 ENCOUNTER FOR OTHER GENERAL EXAMINATION: ICD-10-CM

## 2020-09-05 PROCEDURE — 76775 US EXAM ABDO BACK WALL LIM: CPT

## 2020-09-05 PROCEDURE — 76775 US EXAM ABDO BACK WALL LIM: CPT | Mod: 26

## 2020-09-11 ENCOUNTER — TRANSCRIPTION ENCOUNTER (OUTPATIENT)
Age: 70
End: 2020-09-11

## 2020-09-18 ENCOUNTER — LABORATORY RESULT (OUTPATIENT)
Age: 70
End: 2020-09-18

## 2020-09-18 LAB
ANION GAP SERPL CALC-SCNC: 9 MMOL/L
BUN SERPL-MCNC: 20 MG/DL
CALCIUM SERPL-MCNC: 9.4 MG/DL
CHLORIDE SERPL-SCNC: 100 MMOL/L
CO2 SERPL-SCNC: 28 MMOL/L
CREAT SERPL-MCNC: 0.96 MG/DL
GLUCOSE SERPL-MCNC: 181 MG/DL
POTASSIUM SERPL-SCNC: 4.6 MMOL/L
PSA SERPL-MCNC: 1.72 NG/ML
SODIUM SERPL-SCNC: 138 MMOL/L

## 2020-09-21 LAB
BSA DERIVED: 1.73 M2
CREAT 24H UR-MCNC: NORMAL G/24 H
CREAT ?TM UR-MCNC: 118 MG/DL
CREAT CL 24H UR+SERPL-VRATE: NORMAL
PROT ?TM UR-MCNC: NORMAL
SPECIMEN VOL 24H UR: NORMAL

## 2020-10-19 ENCOUNTER — RESULT REVIEW (OUTPATIENT)
Age: 70
End: 2020-10-19

## 2021-03-02 RX ORDER — PREDNISONE 20 MG/1
20 TABLET ORAL DAILY
Qty: 70 | Refills: 0 | Status: DISCONTINUED | COMMUNITY
Start: 2021-03-01 | End: 2021-03-02

## 2021-03-03 ENCOUNTER — NON-APPOINTMENT (OUTPATIENT)
Age: 71
End: 2021-03-03

## 2021-03-17 RX ORDER — SODIUM SULFATE, POTASSIUM SULFATE, MAGNESIUM SULFATE 17.5; 3.13; 1.6 G/ML; G/ML; G/ML
17.5-3.13-1.6 SOLUTION, CONCENTRATE ORAL
Qty: 1 | Refills: 0 | Status: DISCONTINUED | COMMUNITY
Start: 2021-03-01 | End: 2021-03-17

## 2021-03-17 RX ORDER — PREDNISONE 10 MG/1
10 TABLET ORAL
Qty: 30 | Refills: 0 | Status: DISCONTINUED | COMMUNITY
Start: 2020-12-18 | End: 2021-03-17

## 2021-03-17 RX ORDER — BUDESONIDE 3 MG/1
3 CAPSULE, COATED PELLETS ORAL EVERY MORNING
Qty: 180 | Refills: 3 | Status: DISCONTINUED | COMMUNITY
Start: 2019-04-30 | End: 2021-03-17

## 2021-03-17 RX ORDER — PREDNISONE 20 MG/1
20 TABLET ORAL DAILY
Qty: 14 | Refills: 3 | Status: DISCONTINUED | COMMUNITY
Start: 2020-01-02 | End: 2021-03-17

## 2021-03-17 RX ORDER — PREDNISONE 20 MG/1
20 TABLET ORAL
Qty: 20 | Refills: 3 | Status: DISCONTINUED | COMMUNITY
Start: 2020-01-02 | End: 2021-03-17

## 2021-03-17 RX ORDER — SODIUM SULFATE, POTASSIUM SULFATE, MAGNESIUM SULFATE 17.5; 3.13; 1.6 G/ML; G/ML; G/ML
17.5-3.13-1.6 SOLUTION, CONCENTRATE ORAL
Qty: 1 | Refills: 0 | Status: DISCONTINUED | COMMUNITY
Start: 2020-01-07 | End: 2021-03-17

## 2021-03-17 RX ORDER — PREDNISONE 10 MG/1
10 TABLET ORAL
Qty: 90 | Refills: 0 | Status: DISCONTINUED | COMMUNITY
Start: 2020-08-03 | End: 2021-03-17

## 2021-03-17 RX ORDER — BUDESONIDE 9 MG/1
9 TABLET, EXTENDED RELEASE ORAL DAILY
Qty: 30 | Refills: 3 | Status: DISCONTINUED | COMMUNITY
Start: 2020-01-17 | End: 2021-03-17

## 2021-03-17 RX ORDER — BUDESONIDE 9 MG/1
9 TABLET, EXTENDED RELEASE ORAL DAILY
Qty: 30 | Refills: 3 | Status: DISCONTINUED | COMMUNITY
Start: 2020-01-16 | End: 2021-03-17

## 2021-03-19 ENCOUNTER — NON-APPOINTMENT (OUTPATIENT)
Age: 71
End: 2021-03-19

## 2021-03-23 ENCOUNTER — APPOINTMENT (OUTPATIENT)
Dept: GASTROENTEROLOGY | Facility: CLINIC | Age: 71
End: 2021-03-23
Payer: COMMERCIAL

## 2021-03-23 VITALS
SYSTOLIC BLOOD PRESSURE: 140 MMHG | DIASTOLIC BLOOD PRESSURE: 79 MMHG | HEART RATE: 91 BPM | BODY MASS INDEX: 23.14 KG/M2 | HEIGHT: 78 IN | WEIGHT: 200 LBS

## 2021-03-23 PROCEDURE — 99213 OFFICE O/P EST LOW 20 MIN: CPT

## 2021-03-23 PROCEDURE — 99072 ADDL SUPL MATRL&STAF TM PHE: CPT

## 2021-03-23 NOTE — HISTORY OF PRESENT ILLNESS
[de-identified] : 69 yo male with hx of Crohn's and dvt on Xarelto per Dr. Mary.  Pt is better now with less abdominal pain and diarrhea after a presumed crohn's flare after speaking with him over the phone.  Patient is tapering off steroids and continues to remain on budesonide.  Last colonoscopy was about 1 year ago and to repeat.  Does have occasional BRBPR at times when diarrhea occurs.  No fevers, n/v, dysphagia, gerd.

## 2021-03-23 NOTE — ASSESSMENT
[FreeTextEntry1] : 69 yo male with hx of Crohn's and dvt on Xarelto per Dr. Mary.  Patient with recent presumed crohn's flare, taper off steroids now and continues to remain on budesonide.  Last colonoscopy was about 1 year ago and to repeat.  \par \par Plan: \par Colonoscopy with Dr. Greene. \par Continue to taper off steroids and remain on budesonide. \par Will be clearance to hold xarelto due to hx of DVT from Dr. Mary. \par \par Discussed with Dr. Greene.

## 2021-03-23 NOTE — REVIEW OF SYSTEMS
[As Noted in HPI] : as noted in HPI [Abdominal Pain] : abdominal pain [Diarrhea] : diarrhea [Negative] : Heme/Lymph

## 2021-04-11 ENCOUNTER — APPOINTMENT (OUTPATIENT)
Dept: DISASTER EMERGENCY | Facility: CLINIC | Age: 71
End: 2021-04-11

## 2021-04-11 LAB — SARS-COV-2 N GENE NPH QL NAA+PROBE: NOT DETECTED

## 2021-04-14 ENCOUNTER — RESULT REVIEW (OUTPATIENT)
Age: 71
End: 2021-04-14

## 2021-04-14 ENCOUNTER — APPOINTMENT (OUTPATIENT)
Dept: GASTROENTEROLOGY | Facility: AMBULATORY MEDICAL SERVICES | Age: 71
End: 2021-04-14
Payer: COMMERCIAL

## 2021-04-14 PROCEDURE — 45380 COLONOSCOPY AND BIOPSY: CPT

## 2021-04-27 ENCOUNTER — APPOINTMENT (OUTPATIENT)
Dept: GASTROENTEROLOGY | Facility: CLINIC | Age: 71
End: 2021-04-27
Payer: COMMERCIAL

## 2021-04-27 VITALS
HEART RATE: 71 BPM | SYSTOLIC BLOOD PRESSURE: 133 MMHG | WEIGHT: 201 LBS | TEMPERATURE: 96.8 F | BODY MASS INDEX: 23.26 KG/M2 | DIASTOLIC BLOOD PRESSURE: 82 MMHG | HEIGHT: 78 IN

## 2021-04-27 PROCEDURE — 82272 OCCULT BLD FECES 1-3 TESTS: CPT

## 2021-04-27 PROCEDURE — 99204 OFFICE O/P NEW MOD 45 MIN: CPT

## 2021-04-27 PROCEDURE — 99072 ADDL SUPL MATRL&STAF TM PHE: CPT

## 2021-04-27 RX ORDER — POLYETHYLENE GLYCOL 3350 AND ELECTROLYTES WITH LEMON FLAVOR 236; 22.74; 6.74; 5.86; 2.97 G/4L; G/4L; G/4L; G/4L; G/4L
236 POWDER, FOR SOLUTION ORAL
Qty: 1 | Refills: 0 | Status: DISCONTINUED | COMMUNITY
Start: 2021-03-23 | End: 2021-04-27

## 2021-04-27 RX ORDER — PEG-3350, SODIUM SULFATE, SODIUM CHLORIDE, POTASSIUM CHLORIDE, SODIUM ASCORBATE AND ASCORBIC ACID 7.5-2.691G
100 KIT ORAL
Qty: 1 | Refills: 0 | Status: DISCONTINUED | COMMUNITY
Start: 2021-03-29 | End: 2021-04-27

## 2021-04-27 RX ORDER — METOPROLOL SUCCINATE 100 MG/1
100 TABLET, EXTENDED RELEASE ORAL DAILY
Refills: 0 | Status: DISCONTINUED | COMMUNITY
Start: 2018-04-02 | End: 2021-04-27

## 2021-04-27 NOTE — HISTORY OF PRESENT ILLNESS
[FreeTextEntry1] : Sanket was diagnosed with IBD nearly 20 years ago, around the time he was diagnosed with a couple of DVTs.  He has variably been labeled as having either Crohn's colitis and/or ulcerative colitis.  He was on 6-MP years ago.  Since January, he has had flare of the colitis, with frequent loose stools (generally every 1-1/2 hours during the mornings) but sometimes awakening him from a sound sleep; with some rectal bleeding, urgency and tenesmus.  He has continued on Lialda 2.4 gm/day, Budesonide three 3 mg capsules daily, and was started back on Prednisone 60 mg daily in January (now on 20 mg daily).  Repeat colonoscopy 4/14/2021 (Dr. Joaquín Greene) revealed moderate-marked colitis of much of the left colon, to the distal descending colon; the remainder of the colon and terminal ileum was grossly normal, other than a focally inflamed adenoma in the ascending colon.  Immediately post-procedure, Dr Greene suggested Remicade, and Sanket (and wife) wanted second opinion elsewhere.  He has an appointment to see Dr. Harris in late June.  He has been maintained on Xarelto.

## 2021-04-27 NOTE — PHYSICAL EXAM
[General Appearance - Alert] : alert [General Appearance - In No Acute Distress] : in no acute distress [General Appearance - Well Nourished] : well nourished [General Appearance - Well Developed] : well developed [Sclera] : the sclera and conjunctiva were normal [Neck Cervical Mass (___cm)] : no neck mass was observed [Neck Appearance] : the appearance of the neck was normal [Jugular Venous Distention Increased] : there was no jugular-venous distention [Thyroid Diffuse Enlargement] : the thyroid was not enlarged [Thyroid Nodule] : there were no palpable thyroid nodules [Auscultation Breath Sounds / Voice Sounds] : lungs were clear to auscultation bilaterally [Full Pulse] : the pedal pulses are present [Edema] : there was no peripheral edema [Bowel Sounds] : normal bowel sounds [Abdomen Soft] : soft [Abdomen Tenderness] : non-tender [Abdomen Mass (___ Cm)] : no abdominal mass palpated [Normal Sphincter Tone] : normal sphincter tone [No Rectal Mass] : no rectal mass [Internal Hemorrhoid] : internal hemorrhoids [Occult Blood Positive] : stool positive for occult blood [Prostate Size___ (Scale 0-4)] : prostate size was [unfilled] on a scale of 0-4 [Cervical Lymph Nodes Enlarged Posterior Bilaterally] : posterior cervical [Cervical Lymph Nodes Enlarged Anterior Bilaterally] : anterior cervical [Supraclavicular Lymph Nodes Enlarged Bilaterally] : supraclavicular [Inguinal Lymph Nodes Enlarged Bilaterally] : inguinal [Musculoskeletal - Swelling] : no joint swelling seen [Nail Clubbing] : no clubbing  or cyanosis of the fingernails [Skin Turgor] : normal skin turgor [] : no rash [Oriented To Time, Place, And Person] : oriented to person, place, and time [Affect] : the affect was normal [Impaired Insight] : insight and judgment were intact [External Hemorrhoid] : no external hemorrhoids [Prostate Tenderness] : was not tender [FreeTextEntry1] : multiple scars from skin CA excisions; a bit pale; thin skin with some ecchymoses

## 2021-04-27 NOTE — ASSESSMENT
[FreeTextEntry1] : 1.  Inflammatory bowel disease, unclear of Crohn's colitis or ulcerative colitis, with moderate-marked chronic active left-sided colitis [and ascending colon adenoma] at last colonoscopy April 2021.  Smoldering disease despite current treatment.  At increased long-term risk for evolution towards dysplasia, cancer.  \par 2.  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  Hypertension.\par 4.  Hyperlipidemia.\par 5.  Borderline type 2 diabetes mellitus.\par 6.  Status post Mohs surgeries for skin cancers.\par 7.  History of kidney stones; BPH.\par 8.  Status post right inguinal herniorrhaphy.\par \par Plan:\par 1.  Extensive medical records reviewed.  He will try to retrieve latest labs for my review.\par 2.  Proceed with MR enterography, as advised by Dr. Greene.  Ultimately, capsule endoscopy could be considered.\par 3.  Increase Lialda to 4.8 g daily.\par 4.  Start Rowasa enemas nightly.\par 5.  Continue prednisone 20 mg, and budesonide 9 mg, daily for now.  Would hold off on further attempts to wean until it is clear that he is improving.\par 6.  Certainly, biologics could be considered, particularly if failure to get the colitis quiet. I briefly reviewed the options, including Remicade, Humira, and Entyvio, but would hold off for now.\par 7.  I encouraged him to keep his appointment with Dr. Harris in 2 months.

## 2021-04-27 NOTE — REVIEW OF SYSTEMS
[Negative] : Heme/Lymph [As Noted in HPI] : as noted in HPI [Diarrhea] : diarrhea [FreeTextEntry7] : rectal bleed, urgency

## 2021-04-27 NOTE — CONSULT LETTER
[Dear  ___] : Dear  [unfilled], [Consult Letter:] : I had the pleasure of evaluating your patient, [unfilled]. [Please see my note below.] : Please see my note below. [Consult Closing:] : Thank you very much for allowing me to participate in the care of this patient.  If you have any questions, please do not hesitate to contact me. [Sincerely,] : Sincerely, [FreeTextEntry3] : Dinesh Johnson M.D.\par

## 2021-05-03 ENCOUNTER — NON-APPOINTMENT (OUTPATIENT)
Age: 71
End: 2021-05-03

## 2021-05-03 ENCOUNTER — APPOINTMENT (OUTPATIENT)
Dept: CT IMAGING | Facility: CLINIC | Age: 71
End: 2021-05-03
Payer: COMMERCIAL

## 2021-05-03 ENCOUNTER — OUTPATIENT (OUTPATIENT)
Dept: OUTPATIENT SERVICES | Facility: HOSPITAL | Age: 71
LOS: 1 days | End: 2021-05-03
Payer: COMMERCIAL

## 2021-05-03 DIAGNOSIS — K51.90 ULCERATIVE COLITIS, UNSPECIFIED, WITHOUT COMPLICATIONS: ICD-10-CM

## 2021-05-03 PROCEDURE — 74177 CT ABD & PELVIS W/CONTRAST: CPT | Mod: 26

## 2021-05-03 PROCEDURE — 82565 ASSAY OF CREATININE: CPT

## 2021-05-03 PROCEDURE — 74177 CT ABD & PELVIS W/CONTRAST: CPT

## 2021-06-02 ENCOUNTER — APPOINTMENT (OUTPATIENT)
Dept: GASTROENTEROLOGY | Facility: CLINIC | Age: 71
End: 2021-06-02
Payer: COMMERCIAL

## 2021-06-02 VITALS
BODY MASS INDEX: 23.72 KG/M2 | DIASTOLIC BLOOD PRESSURE: 89 MMHG | SYSTOLIC BLOOD PRESSURE: 149 MMHG | WEIGHT: 205 LBS | HEIGHT: 78 IN | HEART RATE: 78 BPM | TEMPERATURE: 96.9 F

## 2021-06-02 PROCEDURE — 82272 OCCULT BLD FECES 1-3 TESTS: CPT

## 2021-06-02 PROCEDURE — 99072 ADDL SUPL MATRL&STAF TM PHE: CPT

## 2021-06-02 PROCEDURE — 99214 OFFICE O/P EST MOD 30 MIN: CPT

## 2021-06-02 RX ORDER — PREDNISONE 20 MG/1
20 TABLET ORAL DAILY
Qty: 30 | Refills: 3 | Status: DISCONTINUED | COMMUNITY
Start: 2021-04-27 | End: 2021-06-02

## 2021-06-02 RX ORDER — MESALAMINE 4 G/60ML
4 SUSPENSION RECTAL
Qty: 28 | Refills: 3 | Status: DISCONTINUED | COMMUNITY
Start: 2021-04-27 | End: 2021-06-02

## 2021-06-02 RX ORDER — BUDESONIDE 9 MG/1
9 TABLET, EXTENDED RELEASE ORAL DAILY
Qty: 90 | Refills: 3 | Status: DISCONTINUED | COMMUNITY
Start: 2020-01-14 | End: 2021-06-02

## 2021-06-02 NOTE — CONSULT LETTER
[Dear  ___] : Dear  [unfilled], [Courtesy Letter:] : I had the pleasure of seeing your patient, [unfilled], in my office today. [Please see my note below.] : Please see my note below. [Consult Closing:] : Thank you very much for allowing me to participate in the care of this patient.  If you have any questions, please do not hesitate to contact me. [Sincerely,] : Sincerely, [FreeTextEntry3] : Dinesh Johnson M.D.\par  [DrShelia  ___] : Dr. MEJIA

## 2021-06-02 NOTE — ASSESSMENT
[FreeTextEntry1] : 1.  Inflammatory bowel disease, uncertain if Crohn's or ulcerative colitis, with moderate-marked chronic active left-sided colitis and ascending colon adenoma at last colonoscopy April 2021.  Clinically much improved at this time, with guaiac negative stool on today's exam.  At increased long-term risk for evolution towards dysplasia, cancer.  Concerns about medicine side effects, especially prednisone.\par 2.  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  Hypertension.\par 4.  Hyperlipidemia.\par 5.  History of type 2 diabetes mellitus, exacerbated by steroids.\par 6.  History of kidney stones; BPH.\par 7.  Status post Mohs surgeries for skin cancers.\par 8.  Status post right inguinal herniorrhaphy.\par \par Plan:\par 1.  Decrease prednisone to 15 mg each morning for the next week; if symptoms continue to be reasonably well controlled, decrease to 10 mg daily and stay on that dose until evaluated by Dr. Harris.\par 2.  Continue nightly Rowasa for now.  Hopefully, soon after evaluation by Dr. Harris, would consider alternate night therapy.\par 3.  Continue Lialda and Budesonide at current doses.  \par 4.  He is aware of need for stress dose steroids in the next year, given long-term high-dose steroid use.\par 5.  Can probably hold off on considering other therapeutic options, such as Humira, Entyvio, or Remicade, at this time.\par 6.  Return here in August.\par 7.  Probable repeat colonoscopy in early 2022.

## 2021-06-02 NOTE — HISTORY OF PRESENT ILLNESS
[FreeTextEntry1] : Within a couple of days after consultation here in late April, he noted significant improvement. He had been advised to increase Lialda to 4.8 g daily and to start Rowasa enemas nightly; he has continued on Prednisone 20 mg daily, and Budesonide three 3 mg tablets daily. He notes 1 loose stool each morning which he attributes to Rowasa from the prior evening. He otherwise averages 1 formed BM daily. Rectal bleeding and tenesmus have abated, and fecal urgency is much improved. CT enterography 5/3/2021 revealed bilateral nonobstructing kidney stones and probable sigmoid colitis. He has an appointment to see Dr. Harris for "expert IBD opinion" later this month.

## 2021-06-02 NOTE — PHYSICAL EXAM
n/a [General Appearance - Alert] : alert [General Appearance - In No Acute Distress] : in no acute distress [General Appearance - Well Nourished] : well nourished [General Appearance - Well Developed] : well developed [Normal Sphincter Tone] : normal sphincter tone [No Rectal Mass] : no rectal mass [Internal Hemorrhoid] : internal hemorrhoids [Oriented To Time, Place, And Person] : oriented to person, place, and time [Impaired Insight] : insight and judgment were intact [Affect] : the affect was normal [External Hemorrhoid] : no external hemorrhoids [Occult Blood Positive] : stool was negative for occult blood

## 2021-06-02 NOTE — REASON FOR VISIT
[Follow-Up: _____] : a [unfilled] follow-up visit [Spouse] : spouse [FreeTextEntry1] : Routine follow-up visit. Ulcerative colitis

## 2021-06-03 DIAGNOSIS — Z86.79 PERSONAL HISTORY OF OTHER DISEASES OF THE CIRCULATORY SYSTEM: ICD-10-CM

## 2021-06-03 DIAGNOSIS — Z01.818 ENCOUNTER FOR OTHER PREPROCEDURAL EXAMINATION: ICD-10-CM

## 2021-06-03 DIAGNOSIS — Z87.448 PERSONAL HISTORY OF OTHER DISEASES OF URINARY SYSTEM: ICD-10-CM

## 2021-06-03 DIAGNOSIS — Z87.442 PERSONAL HISTORY OF URINARY CALCULI: ICD-10-CM

## 2021-06-03 DIAGNOSIS — N28.1 CYST OF KIDNEY, ACQUIRED: ICD-10-CM

## 2021-06-03 DIAGNOSIS — N20.1 CALCULUS OF URETER: ICD-10-CM

## 2021-06-03 DIAGNOSIS — Z86.39 PERSONAL HISTORY OF OTHER ENDOCRINE, NUTRITIONAL AND METABOLIC DISEASE: ICD-10-CM

## 2021-06-03 DIAGNOSIS — Z87.19 PERSONAL HISTORY OF OTHER DISEASES OF THE DIGESTIVE SYSTEM: ICD-10-CM

## 2021-06-03 RX ORDER — PREDNISONE 5 MG/1
5 TABLET ORAL EVERY MORNING
Qty: 300 | Refills: 2 | Status: ACTIVE | COMMUNITY
Start: 2021-06-02 | End: 1900-01-01

## 2021-06-04 PROBLEM — Z87.19 HISTORY OF RIGHT INGUINAL HERNIA: Status: RESOLVED | Noted: 2020-09-01 | Resolved: 2021-06-04

## 2021-06-04 PROBLEM — R31.29 HEMATURIA, MICROSCOPIC: Status: RESOLVED | Noted: 2019-05-31 | Resolved: 2021-06-04

## 2021-06-04 PROBLEM — Z86.19 HISTORY OF HERPES ZOSTER: Status: RESOLVED | Noted: 2018-03-09 | Resolved: 2021-06-04

## 2021-06-04 PROBLEM — L53.9 ERYTHEMA: Status: RESOLVED | Noted: 2019-04-24 | Resolved: 2021-06-04

## 2021-06-04 PROBLEM — Z79.01 CHRONIC ANTICOAGULATION: Status: ACTIVE | Noted: 2018-03-09

## 2021-06-07 ENCOUNTER — NON-APPOINTMENT (OUTPATIENT)
Age: 71
End: 2021-06-07

## 2021-06-07 ENCOUNTER — OUTPATIENT (OUTPATIENT)
Dept: OUTPATIENT SERVICES | Facility: HOSPITAL | Age: 71
LOS: 1 days | Discharge: HOME | End: 2021-06-07

## 2021-06-07 DIAGNOSIS — Z86.718 PERSONAL HISTORY OF OTHER VENOUS THROMBOSIS AND EMBOLISM: ICD-10-CM

## 2021-06-08 ENCOUNTER — RESULT REVIEW (OUTPATIENT)
Age: 71
End: 2021-06-08

## 2021-06-08 ENCOUNTER — APPOINTMENT (OUTPATIENT)
Dept: HEMATOLOGY ONCOLOGY | Facility: CLINIC | Age: 71
End: 2021-06-08
Payer: COMMERCIAL

## 2021-06-08 VITALS
SYSTOLIC BLOOD PRESSURE: 151 MMHG | WEIGHT: 205 LBS | DIASTOLIC BLOOD PRESSURE: 82 MMHG | TEMPERATURE: 98.2 F | HEART RATE: 87 BPM | BODY MASS INDEX: 23.69 KG/M2

## 2021-06-08 DIAGNOSIS — Z79.01 LONG TERM (CURRENT) USE OF ANTICOAGULANTS: ICD-10-CM

## 2021-06-08 DIAGNOSIS — R31.29 OTHER MICROSCOPIC HEMATURIA: ICD-10-CM

## 2021-06-08 DIAGNOSIS — Z86.19 PERSONAL HISTORY OF OTHER INFECTIOUS AND PARASITIC DISEASES: ICD-10-CM

## 2021-06-08 DIAGNOSIS — L53.9 ERYTHEMATOUS CONDITION, UNSPECIFIED: ICD-10-CM

## 2021-06-08 DIAGNOSIS — Z87.19 PERSONAL HISTORY OF OTHER DISEASES OF THE DIGESTIVE SYSTEM: ICD-10-CM

## 2021-06-08 PROCEDURE — 99213 OFFICE O/P EST LOW 20 MIN: CPT

## 2021-06-08 RX ORDER — METFORMIN HYDROCHLORIDE 500 MG/1
500 TABLET, COATED ORAL TWICE DAILY
Qty: 60 | Refills: 3 | Status: ACTIVE | COMMUNITY

## 2021-06-08 RX ORDER — ADHESIVE TAPE 3"X 2.3 YD
50 MCG TAPE, NON-MEDICATED TOPICAL
Refills: 0 | Status: ACTIVE | COMMUNITY
Start: 2018-06-26

## 2021-06-08 RX ORDER — IRBESARTAN 300 MG/1
TABLET ORAL
Refills: 0 | Status: ACTIVE | COMMUNITY
Start: 2018-04-02

## 2021-06-08 RX ORDER — ASCORBIC ACID 500 MG
TABLET ORAL
Refills: 0 | Status: ACTIVE | COMMUNITY
Start: 2018-06-26

## 2021-06-08 RX ORDER — CINNAMON BARK 500 MG
CAPSULE ORAL
Refills: 0 | Status: ACTIVE | COMMUNITY

## 2021-06-08 NOTE — HISTORY OF PRESENT ILLNESS
[de-identified] : ELEAZAR HERNANDEZ is a 71 y.o. with a PMH significant for DM, HTN, HLD, BPH, UC, and LLE DVT who we are following while on anticoagulation. \par \par  - Patient was diagnosed with LLE DVT.  At the time of diagnosis of DVT he had flare up of his ulcerative colitis,  He was on high dose prednisone, developed  new onset diabetes, also had been on a several hour car trip.  Family hx of a father who  from a PE at age 74 (unclear if unprovoked).  He was started on Coumadin.\par 18 - Initial consult in our office - Was referred to evaluate if he should stop anticoagulation. \par Thrombophilia eval - negative for Prothrombin gene mutation and Factor V Leiden. \par Negative for antiphospholipid antibodies.  Positive screening for Lupus anticoagulant (can be false positive on anticoagulation) but confirmatory hexagonal assay negative.  \par Low Protein S and C - likely Warfarin effect. \par 18 - Opted to stop Coumadin.  \par \par 18 - Labs repeated ~ 2 months after Coumadin stopped.  Protein C antigen level 62% - still low.  Might indicate mild deficiency or represent lower end of normal distribution.  \par He also had elevated D -dimer - might indicate higher risk of recurrent VTE after AC is discontinued.\par Due to above factors his risk of recurrent thrombosis was felt to be increased.\par Recommended extended/ indefinite anticoagulation with lower dose Xarelto 10 mg daily ([address] Choice trial) .\par \par He has chronic mild LLE swelling - uses compression stocking during plane flights.  \par Has not had a recurrent VTE. \par \par UC has been in a chronic flare over past year.  Highest dose of prednisone last year was 60mg. When had flare in past would get 60mg and then taper down within ~ 2 weeks.  This year had flare in January and was left on prednisone until had colonoscopy 21.  Then he was told there was nothing else that could be done for him and it was suggested he try Remicade.  They were not comfortable with this and so they wanted a second opinion.  \par Saw Dr. Huang and he increased the Mesalamine dose and added mesalamine enema.  He felt this helped a lot and was able to decrease prednisone from 20mg to 15mg this week. \par Patient/wife upset as his skin is very this and he is bruising easily.  Starting to get some skin tears.   [de-identified] : Had colitis flare last summer and needed to start on steroids. Has been on/off since but mostly on, has definitely been on since January.  \par had colonoscopy 4/14/21 with Dr. Greene - findings in the descending colon, sigmoid colon, and rectum c/w UC.  \par Remicade was recommended, but patient wanted a 2nd opinion.  To see Dr. Harris 6/24/21.\par Patient/wife upset as he has been having significant bruising on arms and starting to get some mild skin tears.  Wife thinks maybe he should go back on Coumadin since this never happened with Coumadin. \par Denies any other changes in his family, medical, or social history since his last visit of 7/22/19.

## 2021-06-08 NOTE — PHYSICAL EXAM
[Fully active, able to carry on all pre-disease performance without restriction] : Status 0 - Fully active, able to carry on all pre-disease performance without restriction [Normal] : affect appropriate [de-identified] : anicteric [de-identified] : mild LLE edema  chronic  [de-identified] : forearms bilat several small ecchymoses R>>L, left hand with "coumadin hand"

## 2021-06-08 NOTE — RESULTS/DATA
[FreeTextEntry1] : WBC: 8.84  Hgb: 15.2  Hct: 44.6  MCV: 95.9  Plts: 145\par Ferritin, TSAT: pending

## 2021-06-08 NOTE — REASON FOR VISIT
[Follow-Up Visit] : a follow-up visit for [Spouse] : spouse [FreeTextEntry2] : hx of DVT, on indefinite anticoagulation with low dose Xarelto

## 2021-06-08 NOTE — ASSESSMENT
[FreeTextEntry1] : Patient is a 71 y.o. with history of LLE DVT in 2003 in setting of flare up of UC  and  car travel. Family hx of PE in father (details not available). \par Persistently low protein C and persistent elevated D dimer. Was felt to be at risk for recurrent VTE and indefinite AC recommended.  Currently on low dose Xarelto per [facility] Choice trial.\par \par Patient's main issue is chronic bruising and new mild skin tearing - discussed probably due more to chronic steroid use than the AC.  Expect that changing back to Coumadin will not make any difference.  Reminded them that he was not on chronic steroids when he took Coumadin last. \par Patient considering Remicade - told this would not affect AC in any way, however if effective may help him get off steroids.  He will discuss further with Dr. Harris on 6/24/21\par Continue low dose Xarelto.\par RV in one year/ PRN. \par \par Dr. Churchill (PCP)\par Dr. Rogers (Endo)\par Dr. Johnson / Dr. Harris (GI)\par

## 2021-06-09 DIAGNOSIS — Z79.52 LONG TERM (CURRENT) USE OF SYSTEMIC STEROIDS: ICD-10-CM

## 2021-06-09 DIAGNOSIS — Z79.01 LONG TERM (CURRENT) USE OF ANTICOAGULANTS: ICD-10-CM

## 2021-06-23 ENCOUNTER — INPATIENT (INPATIENT)
Facility: HOSPITAL | Age: 71
LOS: 2 days | Discharge: HOME | DRG: 603 | End: 2021-06-26
Attending: INTERNAL MEDICINE | Admitting: HOSPITALIST
Payer: COMMERCIAL

## 2021-06-23 VITALS
RESPIRATION RATE: 17 BRPM | HEART RATE: 100 BPM | SYSTOLIC BLOOD PRESSURE: 155 MMHG | WEIGHT: 203.05 LBS | HEIGHT: 78 IN | DIASTOLIC BLOOD PRESSURE: 77 MMHG | TEMPERATURE: 98 F | OXYGEN SATURATION: 98 %

## 2021-06-23 DIAGNOSIS — L03.116 CELLULITIS OF LEFT LOWER LIMB: ICD-10-CM

## 2021-06-23 DIAGNOSIS — Z86.718 PERSONAL HISTORY OF OTHER VENOUS THROMBOSIS AND EMBOLISM: ICD-10-CM

## 2021-06-23 DIAGNOSIS — Z79.52 LONG TERM (CURRENT) USE OF SYSTEMIC STEROIDS: ICD-10-CM

## 2021-06-23 DIAGNOSIS — Z79.01 LONG TERM (CURRENT) USE OF ANTICOAGULANTS: ICD-10-CM

## 2021-06-23 DIAGNOSIS — I10 ESSENTIAL (PRIMARY) HYPERTENSION: ICD-10-CM

## 2021-06-23 DIAGNOSIS — K50.90 CROHN'S DISEASE, UNSPECIFIED, WITHOUT COMPLICATIONS: ICD-10-CM

## 2021-06-23 DIAGNOSIS — E11.9 TYPE 2 DIABETES MELLITUS WITHOUT COMPLICATIONS: ICD-10-CM

## 2021-06-23 DIAGNOSIS — L03.114 CELLULITIS OF LEFT UPPER LIMB: ICD-10-CM

## 2021-06-23 DIAGNOSIS — Z79.84 LONG TERM (CURRENT) USE OF ORAL HYPOGLYCEMIC DRUGS: ICD-10-CM

## 2021-06-23 LAB
ADD ON TEST-SPECIMEN IN LAB: SIGNIFICANT CHANGE UP
ALBUMIN SERPL ELPH-MCNC: 3 G/DL — LOW (ref 3.3–5)
ALP SERPL-CCNC: 41 U/L — SIGNIFICANT CHANGE UP (ref 40–120)
ALT FLD-CCNC: 49 U/L — SIGNIFICANT CHANGE UP (ref 12–78)
ANION GAP SERPL CALC-SCNC: 2 MMOL/L — LOW (ref 5–17)
APTT BLD: 27.8 SEC — SIGNIFICANT CHANGE UP (ref 27.5–35.5)
AST SERPL-CCNC: 28 U/L — SIGNIFICANT CHANGE UP (ref 15–37)
BASOPHILS # BLD AUTO: 0.04 K/UL — SIGNIFICANT CHANGE UP (ref 0–0.2)
BASOPHILS NFR BLD AUTO: 0.2 % — SIGNIFICANT CHANGE UP (ref 0–2)
BILIRUB SERPL-MCNC: 0.6 MG/DL — SIGNIFICANT CHANGE UP (ref 0.2–1.2)
BUN SERPL-MCNC: 27 MG/DL — HIGH (ref 7–23)
CALCIUM SERPL-MCNC: 8.3 MG/DL — LOW (ref 8.5–10.1)
CHLORIDE SERPL-SCNC: 106 MMOL/L — SIGNIFICANT CHANGE UP (ref 96–108)
CO2 SERPL-SCNC: 28 MMOL/L — SIGNIFICANT CHANGE UP (ref 22–31)
CREAT SERPL-MCNC: 0.97 MG/DL — SIGNIFICANT CHANGE UP (ref 0.5–1.3)
EOSINOPHIL # BLD AUTO: 0.02 K/UL — SIGNIFICANT CHANGE UP (ref 0–0.5)
EOSINOPHIL NFR BLD AUTO: 0.1 % — SIGNIFICANT CHANGE UP (ref 0–6)
GLUCOSE SERPL-MCNC: 119 MG/DL — HIGH (ref 70–99)
HCT VFR BLD CALC: 45.9 % — SIGNIFICANT CHANGE UP (ref 39–50)
HGB BLD-MCNC: 15 G/DL — SIGNIFICANT CHANGE UP (ref 13–17)
IMM GRANULOCYTES NFR BLD AUTO: 0.6 % — SIGNIFICANT CHANGE UP (ref 0–1.5)
INR BLD: 1.26 RATIO — HIGH (ref 0.88–1.16)
LYMPHOCYTES # BLD AUTO: 1.16 K/UL — SIGNIFICANT CHANGE UP (ref 1–3.3)
LYMPHOCYTES # BLD AUTO: 6.2 % — LOW (ref 13–44)
MCHC RBC-ENTMCNC: 32.1 PG — SIGNIFICANT CHANGE UP (ref 27–34)
MCHC RBC-ENTMCNC: 32.7 GM/DL — SIGNIFICANT CHANGE UP (ref 32–36)
MCV RBC AUTO: 98.3 FL — SIGNIFICANT CHANGE UP (ref 80–100)
MONOCYTES # BLD AUTO: 1.17 K/UL — HIGH (ref 0–0.9)
MONOCYTES NFR BLD AUTO: 6.2 % — SIGNIFICANT CHANGE UP (ref 2–14)
NEUTROPHILS # BLD AUTO: 16.23 K/UL — HIGH (ref 1.8–7.4)
NEUTROPHILS NFR BLD AUTO: 86.7 % — HIGH (ref 43–77)
PLATELET # BLD AUTO: 131 K/UL — LOW (ref 150–400)
POTASSIUM SERPL-MCNC: 3.9 MMOL/L — SIGNIFICANT CHANGE UP (ref 3.5–5.3)
POTASSIUM SERPL-SCNC: 3.9 MMOL/L — SIGNIFICANT CHANGE UP (ref 3.5–5.3)
PROT SERPL-MCNC: 6.1 GM/DL — SIGNIFICANT CHANGE UP (ref 6–8.3)
PROTHROM AB SERPL-ACNC: 14.6 SEC — HIGH (ref 10.6–13.6)
RBC # BLD: 4.67 M/UL — SIGNIFICANT CHANGE UP (ref 4.2–5.8)
RBC # FLD: 13.8 % — SIGNIFICANT CHANGE UP (ref 10.3–14.5)
SODIUM SERPL-SCNC: 136 MMOL/L — SIGNIFICANT CHANGE UP (ref 135–145)
WBC # BLD: 18.74 K/UL — HIGH (ref 3.8–10.5)
WBC # FLD AUTO: 18.74 K/UL — HIGH (ref 3.8–10.5)

## 2021-06-23 PROCEDURE — 85027 COMPLETE CBC AUTOMATED: CPT

## 2021-06-23 PROCEDURE — 86769 SARS-COV-2 COVID-19 ANTIBODY: CPT

## 2021-06-23 PROCEDURE — 82962 GLUCOSE BLOOD TEST: CPT

## 2021-06-23 PROCEDURE — 83036 HEMOGLOBIN GLYCOSYLATED A1C: CPT

## 2021-06-23 PROCEDURE — 93005 ELECTROCARDIOGRAM TRACING: CPT

## 2021-06-23 PROCEDURE — 87635 SARS-COV-2 COVID-19 AMP PRB: CPT

## 2021-06-23 PROCEDURE — 80048 BASIC METABOLIC PNL TOTAL CA: CPT

## 2021-06-23 PROCEDURE — 80053 COMPREHEN METABOLIC PANEL: CPT

## 2021-06-23 PROCEDURE — 93971 EXTREMITY STUDY: CPT | Mod: 26,LT

## 2021-06-23 PROCEDURE — 86803 HEPATITIS C AB TEST: CPT

## 2021-06-23 PROCEDURE — 71046 X-RAY EXAM CHEST 2 VIEWS: CPT | Mod: 26

## 2021-06-23 PROCEDURE — 71046 X-RAY EXAM CHEST 2 VIEWS: CPT

## 2021-06-23 PROCEDURE — 93971 EXTREMITY STUDY: CPT | Mod: LT

## 2021-06-23 PROCEDURE — 87040 BLOOD CULTURE FOR BACTERIA: CPT

## 2021-06-23 PROCEDURE — 36415 COLL VENOUS BLD VENIPUNCTURE: CPT

## 2021-06-23 PROCEDURE — 93010 ELECTROCARDIOGRAM REPORT: CPT

## 2021-06-23 PROCEDURE — 83605 ASSAY OF LACTIC ACID: CPT

## 2021-06-23 PROCEDURE — 99285 EMERGENCY DEPT VISIT HI MDM: CPT

## 2021-06-23 RX ORDER — SODIUM CHLORIDE 9 MG/ML
2000 INJECTION INTRAMUSCULAR; INTRAVENOUS; SUBCUTANEOUS ONCE
Refills: 0 | Status: DISCONTINUED | OUTPATIENT
Start: 2021-06-23 | End: 2021-06-23

## 2021-06-23 RX ORDER — SODIUM CHLORIDE 9 MG/ML
1000 INJECTION INTRAMUSCULAR; INTRAVENOUS; SUBCUTANEOUS ONCE
Refills: 0 | Status: COMPLETED | OUTPATIENT
Start: 2021-06-23 | End: 2021-06-23

## 2021-06-23 RX ORDER — CEFEPIME 1 G/1
1000 INJECTION, POWDER, FOR SOLUTION INTRAMUSCULAR; INTRAVENOUS ONCE
Refills: 0 | Status: COMPLETED | OUTPATIENT
Start: 2021-06-23 | End: 2021-06-23

## 2021-06-23 RX ORDER — MESALAMINE 400 MG
1 TABLET, DELAYED RELEASE (ENTERIC COATED) ORAL
Qty: 0 | Refills: 0 | DISCHARGE

## 2021-06-23 RX ORDER — CEFEPIME 1 G/1
1000 INJECTION, POWDER, FOR SOLUTION INTRAMUSCULAR; INTRAVENOUS ONCE
Refills: 0 | Status: DISCONTINUED | OUTPATIENT
Start: 2021-06-23 | End: 2021-06-23

## 2021-06-23 NOTE — ED STATDOCS - NS ED ROS FT
Constitutional: No fever or chills  Eyes: No visual changes  HEENT: No throat pain  CV: No chest pain  Resp: No SOB no cough  GI: No abd pain, nausea or vomiting  : No dysuria  MSK: +lle pain with associated edema and redness  Skin: No rash  Neuro: No headache

## 2021-06-23 NOTE — ED ADULT TRIAGE NOTE - CHIEF COMPLAINT QUOTE
Pt presents to ER c/o LLE pain and edema. Onset of symptoms began 3 days PTA. Denies injury/fall. Hx of blood clots

## 2021-06-23 NOTE — ED STATDOCS - NS ED MD EM SELECTION
Left message advised that Dr Caro had called earlier and left message stating prescription was sent for Bactrim to local pharmacy.  She can have transferred if she is already out of town.  Also would like further labs when she comes back if symptoms persist.   [id number] Comprehensive

## 2021-06-23 NOTE — ED STATDOCS - PROGRESS NOTE DETAILS
72 y/o Male with PMHx of DVT, NIDDM, presents to ED c/o left lower leg pain, swelling and redness today.  Pt denies fevers, chills.  Initally worried that a new DVT was present.  Doppler in ED without new DVT.  On re-exam, pt with erythema to lower leg, circumferential with serous oozing from left leg.  (+) pitting edema, 1+ pedal pulse.  NVI LE bilat.  Neg streaking up left leg.  Will discuss plan with Dr. Mares.  Nati Anne PA-C Pt with Hr 100, WBC 18.76.  Neg fever in ED.  Pt meets some Sepsis criteria.  I had initially ordered 2 Liter of NS, but after discussion with Dr. Jimenez during admission, we decided to give less IVF as pt is also possible CHF overload with bilat LE edema and cellulitis to left lower leg.  Can adjust fluids if CXR shows no fluid overload and lactate is elevated.  Pt will be admitted to hospital for IV Abx.  Nati Anne PA-C

## 2021-06-23 NOTE — ED STATDOCS - OBJECTIVE STATEMENT
70 y/o male with PMHx of Crohn's disease, DVT on Xarelto, HTN, DM presents to the ED c/o left lower leg pain. Pt reports pain onset 3 days ago with no precipitating trauma, injuries, or falls, reports associated edema. Pt states today he noticed acute redness worsening which prompted ED visit. Pt reports history of DVT, states he is on Xarelto. Pt also notes recently taken off Prednisone, which he was previously placed on due to Ulcerative colitis. Denies chest pain, SOB. No other complaints at this time.

## 2021-06-23 NOTE — ED STATDOCS - CLINICAL SUMMARY MEDICAL DECISION MAKING FREE TEXT BOX
70 y/o male presenting with LLE swelling. Hx of blood clots, pt is on Xarelto. Family concerned for another blood clot. Sonogram, blood work, and re-evaluate to r/o DVT. Do not suspect cellulitis, as leg is not warm, hot, or erythematous. Pt is afebrile and well-appearing.

## 2021-06-23 NOTE — ED STATDOCS - CARE PLAN
Principal Discharge DX:	Cellulitis of leg, left  Secondary Diagnosis:	Leg edema  Secondary Diagnosis:	Diabetes mellitus

## 2021-06-23 NOTE — ED STATDOCS - ATTENDING CONTRIBUTION TO CARE
I, Karen Mares MD, performed the initial face to face bedside interview with this patient regarding history of present illness, review of symptoms and relevant past medical, social and family history.  I completed an independent physical examination.  I was the initial provider who evaluated this patient. I have signed out the follow up of any pending tests (i.e. labs, radiological studies) to the ACP.  I have communicated the patient’s plan of care and disposition with the ACP.  The history, relevant review of systems, past medical and surgical history, medical decision making, and physical examination was documented by the scribe in my presence and I attest to the accuracy of the documentation.

## 2021-06-23 NOTE — ED STATDOCS - PHYSICAL EXAMINATION
Constitutional: NAD AAOx3  Eyes: PERRLA EOMI  Head: Normocephalic atraumatic  Mouth: MMM  Cardiac: regular rate   Resp: Lungs CTAB  GI: Abd s/nt/nd, no rebound or guarding.  Neuro: awake, alert, moving all extremities, cranial nerves 2-12 intact, sensation intact, no dysmetria.  Skin: No rashes    MSK: LLE with chronic venostasis, swollen at ankle and foot. neurovascularly intact

## 2021-06-24 ENCOUNTER — APPOINTMENT (OUTPATIENT)
Dept: GASTROENTEROLOGY | Facility: CLINIC | Age: 71
End: 2021-06-24

## 2021-06-24 ENCOUNTER — NON-APPOINTMENT (OUTPATIENT)
Age: 71
End: 2021-06-24

## 2021-06-24 LAB
A1C WITH ESTIMATED AVERAGE GLUCOSE RESULT: 7.7 % — HIGH (ref 4–5.6)
ANION GAP SERPL CALC-SCNC: 5 MMOL/L — SIGNIFICANT CHANGE UP (ref 5–17)
BUN SERPL-MCNC: 18 MG/DL — SIGNIFICANT CHANGE UP (ref 7–23)
CALCIUM SERPL-MCNC: 8.1 MG/DL — LOW (ref 8.5–10.1)
CHLORIDE SERPL-SCNC: 106 MMOL/L — SIGNIFICANT CHANGE UP (ref 96–108)
CO2 SERPL-SCNC: 25 MMOL/L — SIGNIFICANT CHANGE UP (ref 22–31)
COVID-19 SPIKE DOMAIN AB INTERP: POSITIVE
COVID-19 SPIKE DOMAIN ANTIBODY RESULT: 203 U/ML — HIGH
CREAT SERPL-MCNC: 0.69 MG/DL — SIGNIFICANT CHANGE UP (ref 0.5–1.3)
ESTIMATED AVERAGE GLUCOSE: 174 MG/DL — HIGH (ref 68–114)
GLUCOSE SERPL-MCNC: 125 MG/DL — HIGH (ref 70–99)
HCT VFR BLD CALC: 43.5 % — SIGNIFICANT CHANGE UP (ref 39–50)
HCV AB S/CO SERPL IA: 0.08 S/CO — SIGNIFICANT CHANGE UP (ref 0–0.99)
HCV AB SERPL-IMP: SIGNIFICANT CHANGE UP
HGB BLD-MCNC: 14.8 G/DL — SIGNIFICANT CHANGE UP (ref 13–17)
LACTATE SERPL-SCNC: 1.2 MMOL/L — SIGNIFICANT CHANGE UP (ref 0.7–2)
MCHC RBC-ENTMCNC: 32.6 PG — SIGNIFICANT CHANGE UP (ref 27–34)
MCHC RBC-ENTMCNC: 34 GM/DL — SIGNIFICANT CHANGE UP (ref 32–36)
MCV RBC AUTO: 95.8 FL — SIGNIFICANT CHANGE UP (ref 80–100)
PLATELET # BLD AUTO: 119 K/UL — LOW (ref 150–400)
POTASSIUM SERPL-MCNC: 3.6 MMOL/L — SIGNIFICANT CHANGE UP (ref 3.5–5.3)
POTASSIUM SERPL-SCNC: 3.6 MMOL/L — SIGNIFICANT CHANGE UP (ref 3.5–5.3)
RBC # BLD: 4.54 M/UL — SIGNIFICANT CHANGE UP (ref 4.2–5.8)
RBC # FLD: 13.7 % — SIGNIFICANT CHANGE UP (ref 10.3–14.5)
SARS-COV-2 IGG+IGM SERPL QL IA: 203 U/ML — HIGH
SARS-COV-2 IGG+IGM SERPL QL IA: POSITIVE
SARS-COV-2 RNA SPEC QL NAA+PROBE: SIGNIFICANT CHANGE UP
SODIUM SERPL-SCNC: 136 MMOL/L — SIGNIFICANT CHANGE UP (ref 135–145)
WBC # BLD: 17.38 K/UL — HIGH (ref 3.8–10.5)
WBC # FLD AUTO: 17.38 K/UL — HIGH (ref 3.8–10.5)

## 2021-06-24 PROCEDURE — 99222 1ST HOSP IP/OBS MODERATE 55: CPT

## 2021-06-24 PROCEDURE — 93971 EXTREMITY STUDY: CPT | Mod: 26,LT

## 2021-06-24 PROCEDURE — 12345: CPT | Mod: NC,GC

## 2021-06-24 RX ORDER — MESALAMINE 400 MG
4.8 TABLET, DELAYED RELEASE (ENTERIC COATED) ORAL DAILY
Refills: 0 | Status: DISCONTINUED | OUTPATIENT
Start: 2021-06-24 | End: 2021-06-26

## 2021-06-24 RX ORDER — DEXTROSE 50 % IN WATER 50 %
25 SYRINGE (ML) INTRAVENOUS ONCE
Refills: 0 | Status: DISCONTINUED | OUTPATIENT
Start: 2021-06-24 | End: 2021-06-26

## 2021-06-24 RX ORDER — RIVAROXABAN 15 MG-20MG
10 KIT ORAL
Refills: 0 | Status: DISCONTINUED | OUTPATIENT
Start: 2021-06-24 | End: 2021-06-26

## 2021-06-24 RX ORDER — ATORVASTATIN CALCIUM 80 MG/1
20 TABLET, FILM COATED ORAL AT BEDTIME
Refills: 0 | Status: DISCONTINUED | OUTPATIENT
Start: 2021-06-24 | End: 2021-06-26

## 2021-06-24 RX ORDER — CALCIUM CARBONATE 500(1250)
1 TABLET ORAL DAILY
Refills: 0 | Status: DISCONTINUED | OUTPATIENT
Start: 2021-06-24 | End: 2021-06-26

## 2021-06-24 RX ORDER — HYDRALAZINE HCL 50 MG
10 TABLET ORAL EVERY 6 HOURS
Refills: 0 | Status: DISCONTINUED | OUTPATIENT
Start: 2021-06-24 | End: 2021-06-26

## 2021-06-24 RX ORDER — DEXTROSE 50 % IN WATER 50 %
12.5 SYRINGE (ML) INTRAVENOUS ONCE
Refills: 0 | Status: DISCONTINUED | OUTPATIENT
Start: 2021-06-24 | End: 2021-06-26

## 2021-06-24 RX ORDER — SODIUM CHLORIDE 9 MG/ML
1000 INJECTION, SOLUTION INTRAVENOUS
Refills: 0 | Status: DISCONTINUED | OUTPATIENT
Start: 2021-06-24 | End: 2021-06-26

## 2021-06-24 RX ORDER — CEFEPIME 1 G/1
1000 INJECTION, POWDER, FOR SOLUTION INTRAMUSCULAR; INTRAVENOUS EVERY 12 HOURS
Refills: 0 | Status: DISCONTINUED | OUTPATIENT
Start: 2021-06-24 | End: 2021-06-26

## 2021-06-24 RX ORDER — MESALAMINE 400 MG
4 TABLET, DELAYED RELEASE (ENTERIC COATED) ORAL DAILY
Refills: 0 | Status: DISCONTINUED | OUTPATIENT
Start: 2021-06-24 | End: 2021-06-26

## 2021-06-24 RX ORDER — INSULIN LISPRO 100/ML
VIAL (ML) SUBCUTANEOUS
Refills: 0 | Status: DISCONTINUED | OUTPATIENT
Start: 2021-06-24 | End: 2021-06-26

## 2021-06-24 RX ORDER — GLUCAGON INJECTION, SOLUTION 0.5 MG/.1ML
1 INJECTION, SOLUTION SUBCUTANEOUS ONCE
Refills: 0 | Status: DISCONTINUED | OUTPATIENT
Start: 2021-06-24 | End: 2021-06-26

## 2021-06-24 RX ORDER — ACETAMINOPHEN 500 MG
650 TABLET ORAL EVERY 6 HOURS
Refills: 0 | Status: DISCONTINUED | OUTPATIENT
Start: 2021-06-24 | End: 2021-06-26

## 2021-06-24 RX ORDER — LOSARTAN POTASSIUM 100 MG/1
100 TABLET, FILM COATED ORAL DAILY
Refills: 0 | Status: DISCONTINUED | OUTPATIENT
Start: 2021-06-24 | End: 2021-06-26

## 2021-06-24 RX ORDER — SODIUM CHLORIDE 9 MG/ML
1000 INJECTION INTRAMUSCULAR; INTRAVENOUS; SUBCUTANEOUS
Refills: 0 | Status: DISCONTINUED | OUTPATIENT
Start: 2021-06-24 | End: 2021-06-26

## 2021-06-24 RX ORDER — AMLODIPINE BESYLATE 2.5 MG/1
10 TABLET ORAL DAILY
Refills: 0 | Status: DISCONTINUED | OUTPATIENT
Start: 2021-06-24 | End: 2021-06-26

## 2021-06-24 RX ORDER — DEXTROSE 50 % IN WATER 50 %
15 SYRINGE (ML) INTRAVENOUS ONCE
Refills: 0 | Status: DISCONTINUED | OUTPATIENT
Start: 2021-06-24 | End: 2021-06-26

## 2021-06-24 RX ORDER — INSULIN LISPRO 100/ML
VIAL (ML) SUBCUTANEOUS AT BEDTIME
Refills: 0 | Status: DISCONTINUED | OUTPATIENT
Start: 2021-06-24 | End: 2021-06-26

## 2021-06-24 RX ADMIN — Medication 4: at 17:03

## 2021-06-24 RX ADMIN — ATORVASTATIN CALCIUM 20 MILLIGRAM(S): 80 TABLET, FILM COATED ORAL at 21:54

## 2021-06-24 RX ADMIN — Medication 2: at 12:01

## 2021-06-24 RX ADMIN — Medication 1 TABLET(S): at 10:39

## 2021-06-24 RX ADMIN — Medication 10 MILLIGRAM(S): at 04:49

## 2021-06-24 RX ADMIN — SODIUM CHLORIDE 125 MILLILITER(S): 9 INJECTION INTRAMUSCULAR; INTRAVENOUS; SUBCUTANEOUS at 13:25

## 2021-06-24 RX ADMIN — Medication 4 GRAM(S): at 10:39

## 2021-06-24 RX ADMIN — LOSARTAN POTASSIUM 100 MILLIGRAM(S): 100 TABLET, FILM COATED ORAL at 10:39

## 2021-06-24 RX ADMIN — CEFEPIME 1000 MILLIGRAM(S): 1 INJECTION, POWDER, FOR SOLUTION INTRAMUSCULAR; INTRAVENOUS at 00:09

## 2021-06-24 RX ADMIN — CEFEPIME 1000 MILLIGRAM(S): 1 INJECTION, POWDER, FOR SOLUTION INTRAMUSCULAR; INTRAVENOUS at 10:40

## 2021-06-24 RX ADMIN — Medication 4.8 GRAM(S): at 10:39

## 2021-06-24 RX ADMIN — AMLODIPINE BESYLATE 10 MILLIGRAM(S): 2.5 TABLET ORAL at 10:41

## 2021-06-24 RX ADMIN — SODIUM CHLORIDE 125 MILLILITER(S): 9 INJECTION INTRAMUSCULAR; INTRAVENOUS; SUBCUTANEOUS at 04:50

## 2021-06-24 RX ADMIN — RIVAROXABAN 10 MILLIGRAM(S): KIT at 17:02

## 2021-06-24 RX ADMIN — SODIUM CHLORIDE 1000 MILLILITER(S): 9 INJECTION INTRAMUSCULAR; INTRAVENOUS; SUBCUTANEOUS at 00:08

## 2021-06-24 RX ADMIN — Medication 12.5 MILLIGRAM(S): at 17:02

## 2021-06-24 RX ADMIN — CEFEPIME 1000 MILLIGRAM(S): 1 INJECTION, POWDER, FOR SOLUTION INTRAMUSCULAR; INTRAVENOUS at 21:52

## 2021-06-24 NOTE — H&P ADULT - ASSESSMENT
A/P:    1.  Left Leg Cellulitis  Left hand Cellulitis  Sepsis  -started on IV cefepime  -follow clinically and labs  -getting IVF little slowly (30cc/kg)  -follow ID consult    2.  DM  -on ISS  -follow Dxt    3.  h/o DVT  -on Xarelto    4.  Ulcerative colitis  -on Mesalamine    5.  Code Status  -Patient is in Full code status.

## 2021-06-24 NOTE — PROGRESS NOTE ADULT - SUBJECTIVE AND OBJECTIVE BOX
72 y/o Male with PMHx of UC disease, DVT on Xarelto, HTN, DM presents to the ED with complain of left hand and and left lower leg pain. Patient reports pain onset 3 days ago with no precipitating trauma, injuries, or falls, reports associated edema. Patient states today he noticed acute redness worsening which prompted ED visit. Pt reports history of DVT, states he is on Xarelto. Patient also notes recently taken off Prednisone, which he was previously placed on due to Ulcerative colitis. Denies chest pain, SOB. No other complaints at this time. No fever.     Progress: Patient seen at bedside this AM. Reports tenderness in his left hand and left shin has improved in the past day after starting antibiotics. Denies fevers, chills, dyspnea, HA, cough, Nausea, or vomiting.     REVIEW OF SYSTEMS:  CONSTITUTIONAL: No weakness, fevers or chills  EYES/ENT: No visual changes;  No vertigo or throat pain   NECK: No pain or stiffness  RESPIRATORY: No cough, wheezing, hemoptysis; No shortness of breath  CARDIOVASCULAR: No chest pain or palpitations  GASTROINTESTINAL: No abdominal or epigastric pain. No nausea, vomiting, or hematemesis; No diarrhea or constipation. No melena or hematochezia.  GENITOURINARY: No dysuria, frequency or hematuria  NEUROLOGICAL: No numbness or weakness  SKIN: Rash in left lower extremity  All other review of systems is negative unless indicated above    PHYSICAL EXAM:  Vital Signs Last 24 Hrs  T(C): 36.4 (24 Jun 2021 08:44), Max: 37.2 (24 Jun 2021 03:35)  T(F): 97.6 (24 Jun 2021 08:44), Max: 99 (24 Jun 2021 03:35)  HR: 93 (24 Jun 2021 10:37) (88 - 101)  BP: 153/81 (24 Jun 2021 10:37) (136/68 - 187/107)  BP(mean): 129 (24 Jun 2021 01:22) (113 - 129)  RR: 18 (24 Jun 2021 08:44) (17 - 18)  SpO2: 98% (24 Jun 2021 08:44) (98% - 100%)    Constitutional: Pt lying in bed, awake and alert, NAD  HEENT: EOMI, normal hearing, moist mucous membranes  Neck: Soft and supple, no JVD  Respiratory: CTABL, No wheezing, rales or rhonchi  Cardiovascular: S1S2+, RRR, no M/G/R  Gastrointestinal: BS+, soft, NT/ND, no guarding, no rebound  Extremities: 2+ b/l Pedal edema. Increased warmth on left lower extremity versus right. No calf tenderness in b/l extremities  Vascular: +1 peripheral pulses  Neurological: AAOx3, no focal deficits  Musculoskeletal: 5/5 strength b/l upper and lower extremities  Skin: Confluent demarcated blanching rash in left lower shin. B/L Pedal edema and sparse hair in b/l lower extremities.  Left hand tenderness and swelling noted on left hand. B/L small ecchymosis on b/l forearms and hands.       MEDICATIONS:  MEDICATIONS  (STANDING):  amLODIPine   Tablet 10 milliGRAM(s) Oral daily  atorvastatin 20 milliGRAM(s) Oral at bedtime  calcium carbonate    500 mG (Tums) Chewable 1 Tablet(s) Chew daily  cefepime  Injectable. 1000 milliGRAM(s) IV Push every 12 hours  dextrose 40% Gel 15 Gram(s) Oral once  dextrose 5%. 1000 milliLiter(s) (50 mL/Hr) IV Continuous <Continuous>  dextrose 5%. 1000 milliLiter(s) (100 mL/Hr) IV Continuous <Continuous>  dextrose 50% Injectable 25 Gram(s) IV Push once  dextrose 50% Injectable 12.5 Gram(s) IV Push once  dextrose 50% Injectable 25 Gram(s) IV Push once  glucagon  Injectable 1 milliGRAM(s) IntraMuscular once  insulin lispro (ADMELOG) corrective regimen sliding scale   SubCutaneous three times a day before meals  insulin lispro (ADMELOG) corrective regimen sliding scale   SubCutaneous at bedtime  losartan 100 milliGRAM(s) Oral daily  mesalamine DR (24-Hour) Tablet 4.8 Gram(s) Oral daily  mesalamine Enema 4 Gram(s) Rectal daily  predniSONE   Tablet 12.5 milliGRAM(s) Oral daily  rivaroxaban 10 milliGRAM(s) Oral with dinner  sodium chloride 0.9%. 1000 milliLiter(s) (125 mL/Hr) IV Continuous <Continuous>      LABS: All Labs Reviewed:                        14.8   17.38 )-----------( 119      ( 24 Jun 2021 07:52 )             43.5     06-24    136  |  106  |  18  ----------------------------<  125<H>  3.6   |  25  |  0.69    Ca    8.1<L>      24 Jun 2021 07:52    TPro  6.1  /  Alb  3.0<L>  /  TBili  0.6  /  DBili  x   /  AST  28  /  ALT  49  /  AlkPhos  41  06-23    PT/INR - ( 23 Jun 2021 20:49 )   PT: 14.6 sec;   INR: 1.26 ratio         PTT - ( 23 Jun 2021 20:49 )  PTT:27.8 sec      Blood Culture:   I&O's Summary    24 Jun 2021 07:01  -  24 Jun 2021 13:07  --------------------------------------------------------  IN: 0 mL / OUT: 550 mL / NET: -550 mL      CAPILLARY BLOOD GLUCOSE      POCT Blood Glucose.: 170 mg/dL (24 Jun 2021 11:40)  POCT Blood Glucose.: 127 mg/dL (24 Jun 2021 08:21)

## 2021-06-24 NOTE — H&P ADULT - NSHPPHYSICALEXAM_GEN_ALL_CORE
Vital Signs Last 24 Hrs  T(C): 37.2 (24 Jun 2021 03:35), Max: 37.2 (24 Jun 2021 03:35)  T(F): 99 (24 Jun 2021 03:35), Max: 99 (24 Jun 2021 03:35)  HR: 92 (24 Jun 2021 03:35) (88 - 100)  BP: 144/78 (24 Jun 2021 03:35) (144/78 - 187/107)  BP(mean): 129 (24 Jun 2021 01:22) (113 - 129)  RR: 17 (24 Jun 2021 03:35) (17 - 18)  SpO2: 100% (24 Jun 2021 03:35) (98% - 100%)

## 2021-06-24 NOTE — H&P ADULT - HISTORY OF PRESENT ILLNESS
70 y/o Male with PMHx of Crohn's disease, DVT on Xarelto, HTN, DM presents to the ED with complain of left hand and and left lower leg pain. Patient reports pain onset 3 days ago with no precipitating trauma, injuries, or falls, reports associated edema. Patient states today he noticed acute redness worsening which prompted ED visit. Pt reports history of DVT, states he is on Xarelto. Patient also notes recently taken off Prednisone, which he was previously placed on due to Ulcerative colitis. Denies chest pain, SOB. No other complaints at this time. No fever.

## 2021-06-24 NOTE — H&P ADULT - NS NEC GEN PE MLT EXAM PC
Bed: 04  Expected date:   Expected time:   Means of arrival:   Comments:  Visual changes   detailed exam

## 2021-06-24 NOTE — PROGRESS NOTE ADULT - ASSESSMENT
72 y/o Male with PMHx of UC, DVT on Xarelto, HTN, DM presents to the ED with complain of left hand and left lower leg pain.         #Left Leg Cellulitis  #Left hand Cellulitis  #Sepsis  2/4 Sirs criteria(Elevated wbc and tachycardia). Mod Qsofa 0/3. Lactate 1.2  -Cont on IV cefepime Day 1  -/hr until 9PM. May DC fluids earlier if patient's BP remain elevated   -Follow up ID consult  -Order Left extremity US if left upper extremity swelling does not improve  tommorow      #HTN  -Cont home Losartan  -Cont home amlodipine---> Possibly contributing to b/l pedal edema       #T2DM  -On ISS  -Follow up A1c    #H/O DVT 10 years ago in left lower extremity  -On Xarelto    #Ulcerative colitis  -On home Mesalamine   -On home Prednisione taper. Currently 12.5mg     #Code Status  -Patient is in Full code status.       #Disposition  Cont IV antibiotics and possibly switch to PO tomorrow or Saturday.  Patient fully independent of ADLS and IADLS

## 2021-06-24 NOTE — H&P ADULT - NSICDXPASTMEDICALHX_GEN_ALL_CORE_FT
PAST MEDICAL HISTORY:  Crohn's disease     DM (diabetes mellitus)     DVT (deep venous thrombosis)     HTN (hypertension)

## 2021-06-25 LAB
ALBUMIN SERPL ELPH-MCNC: 2.4 G/DL — LOW (ref 3.3–5)
ALP SERPL-CCNC: 41 U/L — SIGNIFICANT CHANGE UP (ref 40–120)
ALT FLD-CCNC: 32 U/L — SIGNIFICANT CHANGE UP (ref 12–78)
ANION GAP SERPL CALC-SCNC: 3 MMOL/L — LOW (ref 5–17)
AST SERPL-CCNC: 15 U/L — SIGNIFICANT CHANGE UP (ref 15–37)
BILIRUB SERPL-MCNC: 0.6 MG/DL — SIGNIFICANT CHANGE UP (ref 0.2–1.2)
BUN SERPL-MCNC: 17 MG/DL — SIGNIFICANT CHANGE UP (ref 7–23)
CALCIUM SERPL-MCNC: 8.2 MG/DL — LOW (ref 8.5–10.1)
CHLORIDE SERPL-SCNC: 110 MMOL/L — HIGH (ref 96–108)
CO2 SERPL-SCNC: 26 MMOL/L — SIGNIFICANT CHANGE UP (ref 22–31)
CREAT SERPL-MCNC: 0.87 MG/DL — SIGNIFICANT CHANGE UP (ref 0.5–1.3)
GLUCOSE SERPL-MCNC: 128 MG/DL — HIGH (ref 70–99)
HCT VFR BLD CALC: 43.7 % — SIGNIFICANT CHANGE UP (ref 39–50)
HGB BLD-MCNC: 14.7 G/DL — SIGNIFICANT CHANGE UP (ref 13–17)
MCHC RBC-ENTMCNC: 32.5 PG — SIGNIFICANT CHANGE UP (ref 27–34)
MCHC RBC-ENTMCNC: 33.6 GM/DL — SIGNIFICANT CHANGE UP (ref 32–36)
MCV RBC AUTO: 96.7 FL — SIGNIFICANT CHANGE UP (ref 80–100)
PLATELET # BLD AUTO: 111 K/UL — LOW (ref 150–400)
POTASSIUM SERPL-MCNC: 4.3 MMOL/L — SIGNIFICANT CHANGE UP (ref 3.5–5.3)
POTASSIUM SERPL-SCNC: 4.3 MMOL/L — SIGNIFICANT CHANGE UP (ref 3.5–5.3)
PROT SERPL-MCNC: 5.5 GM/DL — LOW (ref 6–8.3)
RBC # BLD: 4.52 M/UL — SIGNIFICANT CHANGE UP (ref 4.2–5.8)
RBC # FLD: 13.5 % — SIGNIFICANT CHANGE UP (ref 10.3–14.5)
SODIUM SERPL-SCNC: 139 MMOL/L — SIGNIFICANT CHANGE UP (ref 135–145)
WBC # BLD: 15.37 K/UL — HIGH (ref 3.8–10.5)
WBC # FLD AUTO: 15.37 K/UL — HIGH (ref 3.8–10.5)

## 2021-06-25 PROCEDURE — 99233 SBSQ HOSP IP/OBS HIGH 50: CPT | Mod: GC

## 2021-06-25 RX ORDER — VANCOMYCIN HCL 1 G
750 VIAL (EA) INTRAVENOUS EVERY 12 HOURS
Refills: 0 | Status: DISCONTINUED | OUTPATIENT
Start: 2021-06-25 | End: 2021-06-26

## 2021-06-25 RX ADMIN — Medication 1 TABLET(S): at 09:04

## 2021-06-25 RX ADMIN — AMLODIPINE BESYLATE 10 MILLIGRAM(S): 2.5 TABLET ORAL at 09:04

## 2021-06-25 RX ADMIN — Medication 4: at 16:51

## 2021-06-25 RX ADMIN — Medication 4 GRAM(S): at 22:15

## 2021-06-25 RX ADMIN — LOSARTAN POTASSIUM 100 MILLIGRAM(S): 100 TABLET, FILM COATED ORAL at 09:05

## 2021-06-25 RX ADMIN — CEFEPIME 1000 MILLIGRAM(S): 1 INJECTION, POWDER, FOR SOLUTION INTRAMUSCULAR; INTRAVENOUS at 22:15

## 2021-06-25 RX ADMIN — Medication 250 MILLIGRAM(S): at 22:15

## 2021-06-25 RX ADMIN — RIVAROXABAN 10 MILLIGRAM(S): KIT at 16:51

## 2021-06-25 RX ADMIN — ATORVASTATIN CALCIUM 20 MILLIGRAM(S): 80 TABLET, FILM COATED ORAL at 22:15

## 2021-06-25 RX ADMIN — Medication 4.8 GRAM(S): at 09:06

## 2021-06-25 RX ADMIN — CEFEPIME 1000 MILLIGRAM(S): 1 INJECTION, POWDER, FOR SOLUTION INTRAMUSCULAR; INTRAVENOUS at 09:05

## 2021-06-25 RX ADMIN — Medication 12.5 MILLIGRAM(S): at 09:07

## 2021-06-25 NOTE — PROGRESS NOTE ADULT - SUBJECTIVE AND OBJECTIVE BOX
72 y/o Male with PMHx of UC disease, DVT on Xarelto, HTN, DM presents to the ED with complain of left hand and and left lower leg pain. Patient reports pain onset 3 days ago with no precipitating trauma, injuries, or falls, reports associated edema. Patient states today he noticed acute redness worsening which prompted ED visit. Pt reports history of DVT, states he is on Xarelto. Patient also notes recently taken off Prednisone, which he was previously placed on due to Ulcerative colitis. Denies chest pain, SOB. No other complaints at this time. No fever.     Progress: Patient seen at bedside this AM. Patient states that redness, and swelling has drastically improved in his left arm and hand and left lower leg. Patient is now able to move his fingers in his hand and has less swelling.      REVIEW OF SYSTEMS:    CONSTITUTIONAL: No weakness, fevers or chills  EYES/ENT: No visual changes;  No vertigo or throat pain   NECK: No pain or stiffness  RESPIRATORY: No cough, wheezing, hemoptysis; No shortness of breath  CARDIOVASCULAR: No chest pain or palpitations  GASTROINTESTINAL: No abdominal or epigastric pain. No nausea, vomiting; No diarrhea or constipation.   GENITOURINARY: No dysuria, frequency or hematuria  NEUROLOGICAL: No numbness or weakness  SKIN: Improved redness, tenderness, swelling on left upper and lower       PHYSICAL EXAM:  Vital Signs Last 24 Hrs  T(C): 36.4 (24 Jun 2021 08:44), Max: 37.2 (24 Jun 2021 03:35)  T(F): 97.6 (24 Jun 2021 08:44), Max: 99 (24 Jun 2021 03:35)  HR: 93 (24 Jun 2021 10:37) (88 - 101)  BP: 153/81 (24 Jun 2021 10:37) (136/68 - 187/107)  BP(mean): 129 (24 Jun 2021 01:22) (113 - 129)  RR: 18 (24 Jun 2021 08:44) (17 - 18)  SpO2: 98% (24 Jun 2021 08:44) (98% - 100%)    Constitutional: Pt lying in bed, awake and alert, NAD  HEENT: EOMI, normal hearing, moist mucous membranes  Neck: Soft and supple, no JVD  Respiratory: CTABL, No wheezing, rales or rhonchi  Cardiovascular: S1S2+, RRR, no M/G/R  Gastrointestinal: BS+, soft, NT/ND, no guarding, no rebound  Extremities: 2+ b/l Pedal edema. Increased warmth on left lower extremity versus right. No calf tenderness in b/l extremities  Vascular: +1 peripheral pulses  Neurological: AAOx3, no focal deficits  Musculoskeletal: 5/5 strength b/l upper and lower extremities  Skin: Confluent demarcated blanching rash in left lower shin. B/L Pedal edema and sparse hair in b/l lower extremities.  Left hand tenderness and swelling noted on left hand. B/L small ecchymosis on b/l forearms and hands.       MEDICATIONS:  MEDICATIONS  (STANDING):  amLODIPine   Tablet 10 milliGRAM(s) Oral daily  atorvastatin 20 milliGRAM(s) Oral at bedtime  calcium carbonate    500 mG (Tums) Chewable 1 Tablet(s) Chew daily  cefepime  Injectable. 1000 milliGRAM(s) IV Push every 12 hours  dextrose 40% Gel 15 Gram(s) Oral once  dextrose 5%. 1000 milliLiter(s) (50 mL/Hr) IV Continuous <Continuous>  dextrose 5%. 1000 milliLiter(s) (100 mL/Hr) IV Continuous <Continuous>  dextrose 50% Injectable 25 Gram(s) IV Push once  dextrose 50% Injectable 12.5 Gram(s) IV Push once  dextrose 50% Injectable 25 Gram(s) IV Push once  glucagon  Injectable 1 milliGRAM(s) IntraMuscular once  insulin lispro (ADMELOG) corrective regimen sliding scale   SubCutaneous three times a day before meals  insulin lispro (ADMELOG) corrective regimen sliding scale   SubCutaneous at bedtime  losartan 100 milliGRAM(s) Oral daily  mesalamine DR (24-Hour) Tablet 4.8 Gram(s) Oral daily  mesalamine Enema 4 Gram(s) Rectal daily  predniSONE   Tablet 12.5 milliGRAM(s) Oral daily  rivaroxaban 10 milliGRAM(s) Oral with dinner  sodium chloride 0.9%. 1000 milliLiter(s) (125 mL/Hr) IV Continuous <Continuous>      LABS: All Labs Reviewed:                        14.8   17.38 )-----------( 119      ( 24 Jun 2021 07:52 )             43.5     06-24    136  |  106  |  18  ----------------------------<  125<H>  3.6   |  25  |  0.69    Ca    8.1<L>      24 Jun 2021 07:52    TPro  6.1  /  Alb  3.0<L>  /  TBili  0.6  /  DBili  x   /  AST  28  /  ALT  49  /  AlkPhos  41  06-23    PT/INR - ( 23 Jun 2021 20:49 )   PT: 14.6 sec;   INR: 1.26 ratio         PTT - ( 23 Jun 2021 20:49 )  PTT:27.8 sec      Blood Culture:   I&O's Summary    24 Jun 2021 07:01  -  24 Jun 2021 13:07  --------------------------------------------------------  IN: 0 mL / OUT: 550 mL / NET: -550 mL      CAPILLARY BLOOD GLUCOSE      POCT Blood Glucose.: 170 mg/dL (24 Jun 2021 11:40)  POCT Blood Glucose.: 127 mg/dL (24 Jun 2021 08:21)       72 y/o Male with PMHx of UC disease, DVT on Xarelto, HTN, DM presents to the ED with complain of left hand and and left lower leg pain. Patient reports pain onset 3 days ago with no precipitating trauma, injuries, or falls, reports associated edema. Patient states today he noticed acute redness worsening which prompted ED visit. Pt reports history of DVT, states he is on Xarelto. Patient also notes recently taken off Prednisone, which he was previously placed on due to Ulcerative colitis. Denies chest pain, SOB. No other complaints at this time. No fever.     Progress: Patient seen at bedside this AM. Patient states that redness, and swelling has drastically improved in his left arm and hand and left lower leg. Patient is now able to move his fingers in his hand and has less swelling.      REVIEW OF SYSTEMS:    CONSTITUTIONAL: No weakness, fevers or chills  EYES/ENT: No visual changes;  No vertigo or throat pain   NECK: No pain or stiffness  RESPIRATORY: No cough, wheezing, hemoptysis; No shortness of breath  CARDIOVASCULAR: No chest pain or palpitations  GASTROINTESTINAL: No abdominal or epigastric pain. No nausea, vomiting; No diarrhea or constipation.   GENITOURINARY: No dysuria, frequency or hematuria  NEUROLOGICAL: No numbness or weakness  SKIN: Improved redness, tenderness, swelling on left upper and lower extremities improved.       PHYSICAL EXAM:  Vital Signs Last 24 Hrs  T(C): 36.4 (24 Jun 2021 08:44), Max: 37.2 (24 Jun 2021 03:35)  T(F): 97.6 (24 Jun 2021 08:44), Max: 99 (24 Jun 2021 03:35)  HR: 93 (24 Jun 2021 10:37) (88 - 101)  BP: 153/81 (24 Jun 2021 10:37) (136/68 - 187/107)  BP(mean): 129 (24 Jun 2021 01:22) (113 - 129)  RR: 18 (24 Jun 2021 08:44) (17 - 18)  SpO2: 98% (24 Jun 2021 08:44) (98% - 100%)    Constitutional: Pt lying in bed, awake and alert, NAD  HEENT: EOMI, normal hearing, moist mucous membranes  Neck: Soft and supple, no JVD  Respiratory: CTABL, No wheezing, rales or rhonchi  Cardiovascular: S1S2+, RRR, no M/G/R  Gastrointestinal: BS+, soft, NT/ND, no guarding, no rebound  Extremities: 2+ b/l Pedal edema. Increased warmth on left lower extremity versus right. No calf tenderness in b/l extremities  Vascular: +1 peripheral pulses  Neurological: AAOx3, no focal deficits  Musculoskeletal: 5/5 strength b/l upper and lower extremities  Skin: Confluent demarcated blanching rash in left lower shin. B/L Pedal edema and sparse hair in b/l lower extremities. Improved.  Left hand tenderness and swelling noted on left hand. Improved. B/L small ecchymosis on b/l forearms and hands.       MEDICATIONS:  MEDICATIONS  (STANDING):  amLODIPine   Tablet 10 milliGRAM(s) Oral daily  atorvastatin 20 milliGRAM(s) Oral at bedtime  calcium carbonate    500 mG (Tums) Chewable 1 Tablet(s) Chew daily  cefepime  Injectable. 1000 milliGRAM(s) IV Push every 12 hours  dextrose 40% Gel 15 Gram(s) Oral once  dextrose 5%. 1000 milliLiter(s) (50 mL/Hr) IV Continuous <Continuous>  dextrose 5%. 1000 milliLiter(s) (100 mL/Hr) IV Continuous <Continuous>  dextrose 50% Injectable 25 Gram(s) IV Push once  dextrose 50% Injectable 12.5 Gram(s) IV Push once  dextrose 50% Injectable 25 Gram(s) IV Push once  glucagon  Injectable 1 milliGRAM(s) IntraMuscular once  insulin lispro (ADMELOG) corrective regimen sliding scale   SubCutaneous three times a day before meals  insulin lispro (ADMELOG) corrective regimen sliding scale   SubCutaneous at bedtime  losartan 100 milliGRAM(s) Oral daily  mesalamine DR (24-Hour) Tablet 4.8 Gram(s) Oral daily  mesalamine Enema 4 Gram(s) Rectal daily  predniSONE   Tablet 12.5 milliGRAM(s) Oral daily  rivaroxaban 10 milliGRAM(s) Oral with dinner  sodium chloride 0.9%. 1000 milliLiter(s) (125 mL/Hr) IV Continuous <Continuous>      LABS: All Labs Reviewed:                        14.7   15.37 )-----------( 111      ( 25 Jun 2021 06:03 )             43.7   06-25    139  |  110<H>  |  17  ----------------------------<  128<H>  4.3   |  26  |  0.87    Ca    8.2<L>      25 Jun 2021 06:03    TPro  5.5<L>  /  Alb  2.4<L>  /  TBili  0.6  /  DBili  x   /  AST  15  /  ALT  32  /  AlkPhos  41  06-25    PT/INR - ( 23 Jun 2021 20:49 )   PT: 14.6 sec;   INR: 1.26 ratio         PTT - ( 23 Jun 2021 20:49 )  PTT:27.8 sec      Blood Culture:   I&O's Summary    24 Jun 2021 07:01  -  25 Jun 2021 07:00  --------------------------------------------------------  IN: 0 mL / OUT: 550 mL / NET: -550 mL      CAPILLARY BLOOD GLUCOSE      POCT Blood Glucose.: 146 mg/dL (25 Jun 2021 11:45)  POCT Blood Glucose.: 104 mg/dL (25 Jun 2021 07:50)  POCT Blood Glucose.: 218 mg/dL (24 Jun 2021 21:50)  POCT Blood Glucose.: 231 mg/dL (24 Jun 2021 16:55)    < from: US Duplex Venous Upper Ext Ltd, Left (06.24.21 @ 14:18) >  IMPRESSION:    No evidence of left upper extremity deep venous thrombosis.              TRACY ZAMARRIPA MD; Attending Radiologist  Thisdocument has been electronically signed. Jun 24 2021  2:27PM    < end of copied text >  < from: Xray Chest 2 Views PA/Lat (06.23.21 @ 22:47) >  IMPRESSION: Bilateral chronic apical changes left greater than right.            EDWARD RYDER MD; Attending Radiologist  This document has been electronically signed. Jun 24 2021 11:08AM    < end of copied text >

## 2021-06-25 NOTE — PROGRESS NOTE ADULT - ATTENDING COMMENTS
70 y/o Male with PMHx of UC, DVT on Xarelto, HTN, DM presents to the ED with complain of left hand and left lower leg pain.         #Left Leg Cellulitis  #Left hand Cellulitis  #Sepsis  2/4 Sirs criteria(Elevated wbc and tachycardia). Mod Qsofa 0/3. Lactate 1.2  -Cont on IV cefepime Day 1  -/hr until 9PM. May DC fluids earlier if patient's BP remain elevated   -Follow up ID consult  -Order Left extremity US if left upper extremity swelling does not improve  tommorow
70 y/o Male with PMHx of UC, DVT on Xarelto, HTN, DM presents to the ED with complain of left hand and left lower leg pain.     #Left Leg Cellulitis  #Left hand Cellulitis  #Sepsis  - Patient does not currently meet SIRS Criteria.   - Continue  Cefepime Day 2  - ID Consult Appreciated - Continue Cefepime, Add Vancomycin

## 2021-06-25 NOTE — CONSULT NOTE ADULT - SUBJECTIVE AND OBJECTIVE BOX
Patient is a 71y old  Male who presents with a chief complaint of Left hand redness and swelling and Left leg redness and swelling (24 Jun 2021 13:06)    HPI:  72 y/o Male with PMHx of Crohn's disease, DVT on Xarelto, HTN, DM , on prednisone for Crohn's disease, admitted on 6/23 for evaluation of left hand and left lower leg redness, pain and swelling; the patient has multiple skin breakdowns due to being on long term steroids. Has no fever or chills or any other complaints.         PMH: as above  PSH: as above  Meds: per reconciliation sheet, noted below  MEDICATIONS  (STANDING):  amLODIPine   Tablet 10 milliGRAM(s) Oral daily  atorvastatin 20 milliGRAM(s) Oral at bedtime  calcium carbonate    500 mG (Tums) Chewable 1 Tablet(s) Chew daily  cefepime  Injectable. 1000 milliGRAM(s) IV Push every 12 hours  dextrose 40% Gel 15 Gram(s) Oral once  dextrose 5%. 1000 milliLiter(s) (50 mL/Hr) IV Continuous <Continuous>  dextrose 5%. 1000 milliLiter(s) (100 mL/Hr) IV Continuous <Continuous>  dextrose 50% Injectable 25 Gram(s) IV Push once  dextrose 50% Injectable 12.5 Gram(s) IV Push once  dextrose 50% Injectable 25 Gram(s) IV Push once  glucagon  Injectable 1 milliGRAM(s) IntraMuscular once  insulin lispro (ADMELOG) corrective regimen sliding scale   SubCutaneous three times a day before meals  insulin lispro (ADMELOG) corrective regimen sliding scale   SubCutaneous at bedtime  losartan 100 milliGRAM(s) Oral daily  mesalamine DR (24-Hour) Tablet 4.8 Gram(s) Oral daily  mesalamine Enema 4 Gram(s) Rectal daily  predniSONE   Tablet 12.5 milliGRAM(s) Oral daily  rivaroxaban 10 milliGRAM(s) Oral with dinner  sodium chloride 0.9%. 1000 milliLiter(s) (125 mL/Hr) IV Continuous <Continuous>  vancomycin  IVPB 750 milliGRAM(s) IV Intermittent every 12 hours    MEDICATIONS  (PRN):  acetaminophen   Tablet .. 650 milliGRAM(s) Oral every 6 hours PRN Temp greater or equal to 38C (100.4F), Mild Pain (1 - 3)  hydrALAZINE Injectable 10 milliGRAM(s) IV Push every 6 hours PRN Systolic BP>160 mm Hg    Allergies    No Known Allergies    Intolerances      Social: no smoking, no alcohol, no illegal drugs; no recent travel, no exposure to TB  FAMILY HISTORY:  No pertinent family history in first degree relatives       no history of premature cardiovascular disease in first degree relatives  ROS: the patient denies fever, no chills, no HA, no dizziness, no sore throat, no blurry vision, no CP, no palpitations, no SOB, no cough, no abdominal pain, no diarrhea, no N/V, no dysuria,  no claudication, no rash, no joint aches, no rectal pain or bleeding, no night sweats  All other systems reviewed and are negative    Vital Signs Last 24 Hrs  T(C): 36.7 (25 Jun 2021 09:09), Max: 37.1 (24 Jun 2021 14:52)  T(F): 98.1 (25 Jun 2021 09:09), Max: 98.8 (24 Jun 2021 14:52)  HR: 92 (25 Jun 2021 09:09) (88 - 100)  BP: 147/85 (25 Jun 2021 09:09) (108/57 - 147/85)  BP(mean): --  RR: 18 (25 Jun 2021 09:09) (18 - 18)  SpO2: 100% (25 Jun 2021 09:09) (97% - 100%)  Daily     Daily     PE:    Constitutional: frail looking  HEENT: NC/AT, EOMI, PERRLA, conjunctivae clear; ears and nose atraumatic; pharynx clear  Neck: supple; thyroid not palpable  Back: no tenderness  Respiratory: respiratory effort normal; clear to auscultation  Cardiovascular: S1S2 regular, no murmurs  Abdomen: soft, not tender, not distended, positive BS; no liver or spleen organomegaly  Genitourinary: no suprapubic tenderness  Musculoskeletal: no muscle tenderness, left hand edema and erythema from fingers to mid forearm; left lower extremity with erythema, and swelling from toes to mid calf  Neurological/ Psychiatric: AxOx3, judgement and insight normal;  moving all extremities  Skin: no rashes; no palpable lesions    Labs: all available labs reviewed                        14.7   15.37 )-----------( 111      ( 25 Jun 2021 06:03 )             43.7     06-25    139  |  110<H>  |  17  ----------------------------<  128<H>  4.3   |  26  |  0.87    Ca    8.2<L>      25 Jun 2021 06:03    TPro  5.5<L>  /  Alb  2.4<L>  /  TBili  0.6  /  DBili  x   /  AST  15  /  ALT  32  /  AlkPhos  41  06-25     LIVER FUNCTIONS - ( 25 Jun 2021 06:03 )  Alb: 2.4 g/dL / Pro: 5.5 gm/dL / ALK PHOS: 41 U/L / ALT: 32 U/L / AST: 15 U/L / GGT: x                 Radiology: all available radiological tests reviewed    Advanced directives addressed: full resuscitation

## 2021-06-25 NOTE — CONSULT NOTE ADULT - ASSESSMENT
70 y/o Male with PMHx of Crohn's disease, DVT on Xarelto, HTN, DM , on prednisone for Crohn's disease, admitted on 6/23 for evaluation of left hand and left lower leg redness, pain and swelling; the patient has multiple skin breakdowns due to being on long term steroids. Has no fever or chills or any other complaints.     1. Patient admitted with left upper and left lower cellulitis; also noted with leukocytosis most likely from infection versus steroid effect  - follow up cultures   - serial cbc and monitor temperature   - reviewed prior medical records to evaluate for resistant or atypical pathogens   - agree with cefepime as ordered  - will add vancomycin to treat resistant bacteria   - check vancomycin trough prior to fourth dose   - elevate legs   2. other issues: per medicine

## 2021-06-25 NOTE — PROGRESS NOTE ADULT - ASSESSMENT
70 y/o Male with PMHx of UC, DVT on Xarelto, HTN, DM presents to the ED with complain of left hand and left lower leg pain.         #Left Leg Cellulitis  #Left hand Cellulitis  #Sepsis  2/4 Sirs criteria(Elevated wbc and tachycardia). Mod Qsofa 0/3. Lactate 1.2  -Cont on IV cefepime Day 1  -/hr until 9PM. May DC fluids earlier if patient's BP remain elevated   -Follow up ID consult  -Order Left extremity US if left upper extremity swelling does not improve  tommorow      #HTN  -Cont home Losartan  -Cont home amlodipine---> Possibly contributing to b/l pedal edema       #T2DM  -On ISS  -Follow up A1c    #H/O DVT 10 years ago in left lower extremity  -On Xarelto    #Ulcerative colitis  -On home Mesalamine   -On home Prednisione taper. Currently 12.5mg     #Code Status  -Patient is in Full code status.       #Disposition  Cont IV antibiotics and possibly switch to PO tomorrow or Saturday.  Patient fully independent of ADLS and IADLS 70 y/o Male with PMHx of UC, DVT on Xarelto, HTN, DM presents to the ED with complain of left hand and left lower leg pain.     #Left Leg Cellulitis  #Left hand Cellulitis  #Sepsis  - Patient does not currently meet SIRS Criteria.   - Continue  Cefepime Day 2  - ID Consult Appreciated - Continue Cefepime, Add Vancomycin     #HTN  -Cont home Losartan  -Cont home amlodipine---> Possibly contributing to b/l pedal edema       #T2DM  -On ISS, glucose within normal limits.  - A1c 7 .7    #H/O DVT 10 years ago in left lower extremity  - On Xarelto    #Ulcerative colitis  - On home Mesalamine   - On home Prednisone taper. Currently 12.5mg     #Code Status  - Patient is in Full code status.     #Diet  -  DM Diet     #Disposition  Cont IV antibiotics and possibly switch to PO in AM

## 2021-06-25 NOTE — PROGRESS NOTE ADULT - REASON FOR ADMISSION
Left hand redness and swelling and Left leg redness and swelling
Left hand redness and swelling and Left leg redness and swelling

## 2021-06-26 ENCOUNTER — TRANSCRIPTION ENCOUNTER (OUTPATIENT)
Age: 71
End: 2021-06-26

## 2021-06-26 VITALS
RESPIRATION RATE: 18 BRPM | SYSTOLIC BLOOD PRESSURE: 139 MMHG | DIASTOLIC BLOOD PRESSURE: 83 MMHG | HEART RATE: 84 BPM | OXYGEN SATURATION: 98 % | TEMPERATURE: 99 F

## 2021-06-26 LAB
ALBUMIN SERPL ELPH-MCNC: 2.7 G/DL — LOW (ref 3.3–5)
ALP SERPL-CCNC: 40 U/L — SIGNIFICANT CHANGE UP (ref 40–120)
ALT FLD-CCNC: 36 U/L — SIGNIFICANT CHANGE UP (ref 12–78)
ANION GAP SERPL CALC-SCNC: 5 MMOL/L — SIGNIFICANT CHANGE UP (ref 5–17)
AST SERPL-CCNC: 18 U/L — SIGNIFICANT CHANGE UP (ref 15–37)
BILIRUB SERPL-MCNC: 0.8 MG/DL — SIGNIFICANT CHANGE UP (ref 0.2–1.2)
BUN SERPL-MCNC: 18 MG/DL — SIGNIFICANT CHANGE UP (ref 7–23)
CALCIUM SERPL-MCNC: 8.5 MG/DL — SIGNIFICANT CHANGE UP (ref 8.5–10.1)
CHLORIDE SERPL-SCNC: 108 MMOL/L — SIGNIFICANT CHANGE UP (ref 96–108)
CO2 SERPL-SCNC: 25 MMOL/L — SIGNIFICANT CHANGE UP (ref 22–31)
CREAT SERPL-MCNC: 0.72 MG/DL — SIGNIFICANT CHANGE UP (ref 0.5–1.3)
GLUCOSE SERPL-MCNC: 131 MG/DL — HIGH (ref 70–99)
HCT VFR BLD CALC: 46.4 % — SIGNIFICANT CHANGE UP (ref 39–50)
HGB BLD-MCNC: 15.5 G/DL — SIGNIFICANT CHANGE UP (ref 13–17)
MCHC RBC-ENTMCNC: 31.9 PG — SIGNIFICANT CHANGE UP (ref 27–34)
MCHC RBC-ENTMCNC: 33.4 GM/DL — SIGNIFICANT CHANGE UP (ref 32–36)
MCV RBC AUTO: 95.5 FL — SIGNIFICANT CHANGE UP (ref 80–100)
PLATELET # BLD AUTO: 141 K/UL — LOW (ref 150–400)
POTASSIUM SERPL-MCNC: 3.9 MMOL/L — SIGNIFICANT CHANGE UP (ref 3.5–5.3)
POTASSIUM SERPL-SCNC: 3.9 MMOL/L — SIGNIFICANT CHANGE UP (ref 3.5–5.3)
PROT SERPL-MCNC: 6.5 GM/DL — SIGNIFICANT CHANGE UP (ref 6–8.3)
RBC # BLD: 4.86 M/UL — SIGNIFICANT CHANGE UP (ref 4.2–5.8)
RBC # FLD: 13.5 % — SIGNIFICANT CHANGE UP (ref 10.3–14.5)
SODIUM SERPL-SCNC: 138 MMOL/L — SIGNIFICANT CHANGE UP (ref 135–145)
WBC # BLD: 9.68 K/UL — SIGNIFICANT CHANGE UP (ref 3.8–10.5)
WBC # FLD AUTO: 9.68 K/UL — SIGNIFICANT CHANGE UP (ref 3.8–10.5)

## 2021-06-26 PROCEDURE — 99239 HOSP IP/OBS DSCHRG MGMT >30: CPT | Mod: GC

## 2021-06-26 RX ADMIN — LOSARTAN POTASSIUM 100 MILLIGRAM(S): 100 TABLET, FILM COATED ORAL at 08:37

## 2021-06-26 RX ADMIN — Medication 4: at 12:48

## 2021-06-26 RX ADMIN — Medication 12.5 MILLIGRAM(S): at 08:37

## 2021-06-26 RX ADMIN — Medication 4 GRAM(S): at 08:38

## 2021-06-26 RX ADMIN — Medication 250 MILLIGRAM(S): at 11:20

## 2021-06-26 RX ADMIN — Medication 4.8 GRAM(S): at 08:37

## 2021-06-26 RX ADMIN — CEFEPIME 1000 MILLIGRAM(S): 1 INJECTION, POWDER, FOR SOLUTION INTRAMUSCULAR; INTRAVENOUS at 11:20

## 2021-06-26 RX ADMIN — Medication 1 TABLET(S): at 08:36

## 2021-06-26 RX ADMIN — AMLODIPINE BESYLATE 10 MILLIGRAM(S): 2.5 TABLET ORAL at 08:37

## 2021-06-26 NOTE — DISCHARGE NOTE PROVIDER - HOSPITAL COURSE
70 YO M with a PMH of Crohn's Disease, DVT on Xarelto, HTN, DM, presents for left hand and lower leg pain, redness, warmth. Patient states that this occured 3 days prior to presentation. The acute redness caused him to report to the hospital. Patient has a history of prednisone due to Crohn's. Doppler ultrasounds were negative for DVT, patient was started on Cefepime fore 3 days, was seen by Infectious Disease which added 1 day of vancomycin. Patient will be discharged on Doxycycline for 7 days to complete a 10 day course of antibiotics.     Subjective: Patient states that his redness and swelling has markedly improved since being on antibiotics. He is medically stable to be discharged.     REVIEW OF SYSTEMS:    CONSTITUTIONAL: No weakness, fevers or chills  EYES/ENT: No visual changes;  No vertigo or throat pain   NECK: No pain or stiffness  RESPIRATORY: No cough, wheezing, hemoptysis; No shortness of breath  CARDIOVASCULAR: No chest pain or palpitations  GASTROINTESTINAL: No abdominal or epigastric pain. No nausea, vomiting; No diarrhea or constipation.   GENITOURINARY: No dysuria, frequency or hematuria  NEUROLOGICAL: No numbness or weakness  SKIN: No itching, rashes    PHYSICAL EXAM:  Vital Signs Last 24 Hrs  T(C): 37.2 (26 Jun 2021 08:18), Max: 37.2 (26 Jun 2021 08:18)  T(F): 98.9 (26 Jun 2021 08:18), Max: 98.9 (26 Jun 2021 08:18)  HR: 84 (26 Jun 2021 08:18) (84 - 97)  BP: 139/83 (26 Jun 2021 08:18) (112/59 - 152/75)  BP(mean): --  RR: 18 (26 Jun 2021 08:18) (18 - 18)  SpO2: 98% (26 Jun 2021 08:18) (96% - 98%)    Constitutional: Pt lying in bed, awake and alert, NAD  HEENT: EOMI, normal hearing, moist mucous membranes  Neck: Soft and supple, no JVD  Respiratory: CTABL, No wheezing, rales or rhonchi  Cardiovascular: S1S2+, RRR, no M/G/R  Gastrointestinal: BS+, soft, NT/ND, no guarding, no rebound  Extremities: No peripheral edema  Vascular: 2+ peripheral pulses  Neurological: AAOx3, no focal deficits  Musculoskeletal: 5/5 strength b/l upper and lower extremities  Skin: No rashes

## 2021-06-26 NOTE — DISCHARGE NOTE PROVIDER - NSDCMRMEDTOKEN_GEN_ALL_CORE_FT
amLODIPine 10 mg oral tablet: 1 tab(s) orally once a day  ascorbic acid 500 mg oral tablet: 2.5 tab(s) orally once a day  atorvastatin 20 mg oral tablet: 1 tab(s) orally once a day  budesonide 9 mg oral capsule, extended release: 1 cap(s) orally once a day (in the morning)  Caltrate 600 mg oral tablet: 1 tab(s) orally once a day  Cinnamon 500 mg oral capsule: 1 cap(s) orally once a day  doxycycline hyclate 100 mg oral tablet: 1 tab(s) orally 2 times a day (with meals)   glimepiride 1 mg oral tablet: 1 tab(s) orally once a day  Januvia 100 mg oral tablet: 1 tab(s) orally once a day  losartan 100 mg oral tablet: 1 tab(s) orally once a day  magnesium carbonate 250 mg oral capsule: 1 cap(s) orally once a day  mesalamine 1.2 g oral delayed release tablet: 4 tab(s) orally once a day  mesalamine 4 g/60 mL rectal enema: 60 milliliter(s) rectal once a day  metFORMIN 500 mg oral tablet, extended release: 1 tab(s) orally 2 times a day  Multiple Vitamins oral tablet: 1 tab(s) orally once a day  predniSONE 5 mg oral tablet: 2.5 tab(s) orally once a day  ***pt currently on taper x last 3 weeks- highest dose was 60mg***  Vitamin D3 2000 intl units (50 mcg) oral capsule: 1 cap(s) orally once a day  Xarelto 10 mg oral tablet: 1 tab(s) orally once a day

## 2021-06-26 NOTE — DISCHARGE NOTE NURSING/CASE MANAGEMENT/SOCIAL WORK - PATIENT PORTAL LINK FT
You can access the FollowMyHealth Patient Portal offered by [facility] by registering at the following website: [email or web address]. By joining [email or web address]’s FollowMyHealth portal, you will also be able to view your health information using other applications (apps) compatible with our system.

## 2021-06-26 NOTE — DISCHARGE NOTE PROVIDER - CARE PROVIDER_API CALL
DENNIS MATT  [facility]  [address] [place] [address]  Phone: [phone]  Fax: [phone]  Follow Up Time:

## 2021-06-26 NOTE — CHART NOTE - NSCHARTNOTEFT_GEN_A_CORE
T(C): 37.2 (26 Jun 2021 08:18), Max: 37.2 (26 Jun 2021 08:18)  T(F): 98.9 (26 Jun 2021 08:18), Max: 98.9 (26 Jun 2021 08:18)  HR: 84 (26 Jun 2021 08:18) (84 - 97)  BP: 139/83 (26 Jun 2021 08:18) (112/59 - 152/75)  BP(mean): --  RR: 18 (26 Jun 2021 08:18) (18 - 18)  SpO2: 98% (26 Jun 2021 08:18) (96% - 98%) Infectious Disease Follow Up Note 6/26/21 11: 33 am    pt seen and examined  LLE, LUE less redness/swelling  feeling better      ROS: the patient denies fever, no chills, no HA, no dizziness, no sore throat, no blurry vision, no CP, no palpitations, no SOB, no cough, no abdominal pain, no diarrhea, no N/V, no dysuria,  no claudication, no rash, no joint aches, no rectal pain or bleeding, no night sweats    All other systems reviewed and are negative    T(C): 37.2 (26 Jun 2021 08:18), Max: 37.2 (26 Jun 2021 08:18)  T(F): 98.9 (26 Jun 2021 08:18), Max: 98.9 (26 Jun 2021 08:18)  HR: 84 (26 Jun 2021 08:18) (84 - 97)  BP: 139/83 (26 Jun 2021 08:18) (112/59 - 152/75)  BP(mean): --  RR: 18 (26 Jun 2021 08:18) (18 - 18)  SpO2: 98% (26 Jun 2021 08:18) (96% - 98%).      PE:  Constitutional: frail looking  HEENT: NC/AT, EOMI, PERRLA, conjunctivae clear; ears and nose atraumatic; pharynx clear  Neck: supple; thyroid not palpable  Back: no tenderness  Respiratory: respiratory effort normal; clear to auscultation  Cardiovascular: S1S2 regular, no murmurs  Abdomen: soft, not tender, not distended, positive BS; no liver or spleen organomegaly  Genitourinary: no suprapubic tenderness  Musculoskeletal: no muscle tenderness, left hand edema and erythema from fingers to mid forearm; left lower extremity with erythema, and swelling from toes to mid calf  Neurological/ Psychiatric: AxOx3, judgement and insight normal;  moving all extremities  Skin: no rashes; no palpable lesions    Labs: all available labs reviewed                        14.7   15.37 )-----------( 111      ( 25 Jun 2021 06:03 )             43.7     06-25    139  |  110<H>  |  17  ----------------------------<  128<H>  4.3   |  26  |  0.87    Ca    8.2<L>      25 Jun 2021 06:03    TPro  5.5<L>  /  Alb  2.4<L>  /  TBili  0.6  /  DBili  x   /  AST  15  /  ALT  32  /  AlkPhos  41  06-25     LIVER FUNCTIONS - ( 25 Jun 2021 06:03 )  Alb: 2.4 g/dL / Pro: 5.5 gm/dL / ALK PHOS: 41 U/L / ALT: 32 U/L / AST: 15 U/L / GGT: x                 Radiology: all available radiological tests reviewed    Advanced directives addressed: full resuscitation      Assessment and Recommendation:   · Assessment	  72 y/o Male with PMHx of Crohn's disease, DVT on Xarelto, HTN, DM , on prednisone for Crohn's disease, admitted on 6/23 for evaluation of left hand and left lower leg redness, pain and swelling; the patient has multiple skin breakdowns due to being on long term steroids. Has no fever or chills or any other complaints.     1. Patient admitted with left upper and left lower cellulitis; also noted with leukocytosis most likely from infection versus steroid effect  - improving   - serial cbc and monitor temperature   - reviewed prior medical records to evaluate for resistant or atypical pathogens   - sp vancomycin/cefepime #4    - dc with oral doxyxycline 7 days   - elevate legs     2. other issues: per medicine

## 2021-06-26 NOTE — DISCHARGE NOTE PROVIDER - NSDCCPCAREPLAN_GEN_ALL_CORE_FT
PRINCIPAL DISCHARGE DIAGNOSIS  Diagnosis: Cellulitis of leg, left  Assessment and Plan of Treatment: During this admission you were treated for Cellulitis of your left leg and left hand. You were given IV antibiotics Cefepime and Vancomycin and will need to be discharged on Doxycycline 100 twice a day for 7 more days. Please complete your antibiotic course although you may feel better completely. Please follow up with your primary care provider      SECONDARY DISCHARGE DIAGNOSES  Diagnosis: Leg edema  Assessment and Plan of Treatment: Due to your history of DVT, we continued your medications, and we did a doppler of your legs to see any pathology which was negative. Please continue to follow up with your primary care provider for continued management.

## 2021-06-30 ENCOUNTER — TRANSCRIPTION ENCOUNTER (OUTPATIENT)
Age: 71
End: 2021-06-30

## 2021-07-01 ENCOUNTER — TRANSCRIPTION ENCOUNTER (OUTPATIENT)
Age: 71
End: 2021-07-01

## 2021-07-06 ENCOUNTER — NON-APPOINTMENT (OUTPATIENT)
Age: 71
End: 2021-07-06

## 2021-07-06 ENCOUNTER — TRANSCRIPTION ENCOUNTER (OUTPATIENT)
Age: 71
End: 2021-07-06

## 2021-07-30 ENCOUNTER — APPOINTMENT (OUTPATIENT)
Dept: GASTROENTEROLOGY | Facility: CLINIC | Age: 71
End: 2021-07-30
Payer: COMMERCIAL

## 2021-07-30 VITALS
TEMPERATURE: 97.5 F | WEIGHT: 207 LBS | DIASTOLIC BLOOD PRESSURE: 70 MMHG | HEART RATE: 109 BPM | SYSTOLIC BLOOD PRESSURE: 110 MMHG | OXYGEN SATURATION: 98 % | BODY MASS INDEX: 23.95 KG/M2 | HEIGHT: 78 IN

## 2021-07-30 PROCEDURE — 99245 OFF/OP CONSLTJ NEW/EST HI 55: CPT

## 2021-07-30 NOTE — ASSESSMENT
[FreeTextEntry1] : Steroid-dependent colitis\par If patient cannot taper off corticosteroid completely, including budesonide, highly recommend alternate therapy such as biologic therapy\par \par Plan\par Lengthy discussion with patient and his wife regarding the therapeutic options including previously recommended Remicade, Humira, Entyvio, Stelara, newer small molecule treatments including Xeljanz and ozanimod\par Given the various risks and benefits, routes of infusion, have recommended Entyvio is my personal first choice for his next therapy\par I told him and his wife that it would be reasonable to start therapy now, though it is his decision, and he may prefer to wait to see how he does with continued mesalamine\par In the interim, while he is deciding, have prescribed Canasa suppositories to take the place of the mesalamine enemas that he can no longer tolerate\par Awaiting patient and wife's decision\par Continue prednisone at 5 mg for now, budesonide 9 mg for now until he has a chance to talk to Dr. Johnson again\par \par Patient referred by Dr. Stephen Goldberg

## 2021-07-30 NOTE — PHYSICAL EXAM
[General Appearance - Alert] : alert [General Appearance - In No Acute Distress] : in no acute distress [Sclera] : the sclera and conjunctiva were normal [PERRL With Normal Accommodation] : pupils were equal in size, round, and reactive to light [Extraocular Movements] : extraocular movements were intact [Outer Ear] : the ears and nose were normal in appearance [Oropharynx] : the oropharynx was normal [Neck Appearance] : the appearance of the neck was normal [Neck Cervical Mass (___cm)] : no neck mass was observed [Jugular Venous Distention Increased] : there was no jugular-venous distention [Thyroid Diffuse Enlargement] : the thyroid was not enlarged [Thyroid Nodule] : there were no palpable thyroid nodules [Auscultation Breath Sounds / Voice Sounds] : lungs were clear to auscultation bilaterally [Heart Rate And Rhythm] : heart rate was normal and rhythm regular [Heart Sounds] : normal S1 and S2 [Heart Sounds Gallop] : no gallops [Murmurs] : no murmurs [Heart Sounds Pericardial Friction Rub] : no pericardial rub [Edema] : there was no peripheral edema [Bowel Sounds] : normal bowel sounds [Abdomen Soft] : soft [Abdomen Tenderness] : non-tender [Abdomen Mass (___ Cm)] : no abdominal mass palpated [Cervical Lymph Nodes Enlarged Posterior Bilaterally] : posterior cervical [Cervical Lymph Nodes Enlarged Anterior Bilaterally] : anterior cervical [Supraclavicular Lymph Nodes Enlarged Bilaterally] : supraclavicular [Axillary Lymph Nodes Enlarged Bilaterally] : axillary [Femoral Lymph Nodes Enlarged Bilaterally] : femoral [Inguinal Lymph Nodes Enlarged Bilaterally] : inguinal [No CVA Tenderness] : no ~M costovertebral angle tenderness [No Spinal Tenderness] : no spinal tenderness [Nail Clubbing] : no clubbing  or cyanosis of the fingernails [Abnormal Walk] : normal gait [Musculoskeletal - Swelling] : no joint swelling seen [Motor Tone] : muscle strength and tone were normal [Skin Color & Pigmentation] : normal skin color and pigmentation [Skin Turgor] : normal skin turgor [] : no rash [Oriented To Time, Place, And Person] : oriented to person, place, and time [Impaired Insight] : insight and judgment were intact [Affect] : the affect was normal

## 2021-07-30 NOTE — HISTORY OF PRESENT ILLNESS
[de-identified] : 71-year-old male, 20-year history of inflammatory bowel disease, at different times diagnosed with Crohn's disease versus ulcerative colitis\par Most recent colonoscopies and biopsies April 2021 consistent with left-sided ulcerative colitis\par Patient notably recently hospitalized with severe cellulitis while on high-dose prednisone\par \par Patient has had numerous courses of corticosteroids since his diagnosis 20 years ago\par Patient however denies any specific hospitalization for colitis, IV steroids\par Denies any surgery\par Prior use of 6-MP for a number of years, discontinued electively, no history of adverse reaction\par For most of the past 2 years patient has been continuously on either prednisone, or budesonide, oftentimes both\par Patient is without any history of biologic use, without any history of immune modulator use beyond aforementioned 6-MP\par Following his most recent flare patient was offered Remicade, but is seeking additional opinions, perhaps other options for therapy, explanation of various risks and benefits\par \par Currently on prednisone 5 mg daily\par Budesonide 9 mg daily\par 1-2 bowel movements a day without blood\par When he is at his sickest, reports he can have a bowel movement every 2 hours\par \par Patient reports recent marked improvement with elevated oral dose of mesalamine, and for the first time mesalamine enemas\par Patient however has had to stop the mesalamine enemas due to irritation

## 2021-08-10 ENCOUNTER — NON-APPOINTMENT (OUTPATIENT)
Age: 71
End: 2021-08-10

## 2021-08-12 ENCOUNTER — NON-APPOINTMENT (OUTPATIENT)
Age: 71
End: 2021-08-12

## 2021-08-12 ENCOUNTER — TRANSCRIPTION ENCOUNTER (OUTPATIENT)
Age: 71
End: 2021-08-12

## 2021-08-18 ENCOUNTER — NON-APPOINTMENT (OUTPATIENT)
Age: 71
End: 2021-08-18

## 2021-08-18 ENCOUNTER — TRANSCRIPTION ENCOUNTER (OUTPATIENT)
Age: 71
End: 2021-08-18

## 2021-08-19 LAB
M TB IFN-G BLD-IMP: NEGATIVE
QUANTIFERON TB PLUS MITOGEN MINUS NIL: 2.11 IU/ML
QUANTIFERON TB PLUS NIL: 0.02 IU/ML
QUANTIFERON TB PLUS TB1 MINUS NIL: -0.01 IU/ML
QUANTIFERON TB PLUS TB2 MINUS NIL: -0.01 IU/ML

## 2021-08-21 ENCOUNTER — EMERGENCY (EMERGENCY)
Facility: HOSPITAL | Age: 71
LOS: 0 days | Discharge: HOME | End: 2021-08-21
Attending: EMERGENCY MEDICINE
Payer: COMMERCIAL

## 2021-08-21 VITALS
RESPIRATION RATE: 16 BRPM | HEART RATE: 98 BPM | SYSTOLIC BLOOD PRESSURE: 145 MMHG | TEMPERATURE: 99 F | DIASTOLIC BLOOD PRESSURE: 77 MMHG | OXYGEN SATURATION: 97 % | WEIGHT: 205.03 LBS | HEIGHT: 78 IN

## 2021-08-21 DIAGNOSIS — Z79.84 LONG TERM (CURRENT) USE OF ORAL HYPOGLYCEMIC DRUGS: ICD-10-CM
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
## 2021-08-21 DIAGNOSIS — S80.211A ABRASION, RIGHT KNEE, INITIAL ENCOUNTER: ICD-10-CM
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
## 2021-08-21 DIAGNOSIS — S80.212A ABRASION, LEFT KNEE, INITIAL ENCOUNTER: ICD-10-CM

## 2021-08-21 DIAGNOSIS — Z79.899 OTHER LONG TERM (CURRENT) DRUG THERAPY: ICD-10-CM

## 2021-08-21 DIAGNOSIS — S40.812A ABRASION OF LEFT UPPER ARM, INITIAL ENCOUNTER: ICD-10-CM

## 2021-08-21 DIAGNOSIS — I82.409 ACUTE EMBOLISM AND THROMBOSIS OF UNSPECIFIED DEEP VEINS OF UNSPECIFIED LOWER EXTREMITY: ICD-10-CM

## 2021-08-21 DIAGNOSIS — K50.90 CROHN'S DISEASE, UNSPECIFIED, WITHOUT COMPLICATIONS: ICD-10-CM

## 2021-08-21 DIAGNOSIS — E11.9 TYPE 2 DIABETES MELLITUS WITHOUT COMPLICATIONS: ICD-10-CM

## 2021-08-21 DIAGNOSIS — Z79.01 LONG TERM (CURRENT) USE OF ANTICOAGULANTS: ICD-10-CM

## 2021-08-21 DIAGNOSIS — I10 ESSENTIAL (PRIMARY) HYPERTENSION: ICD-10-CM

## 2021-08-21 DIAGNOSIS — Z79.02 LONG TERM (CURRENT) USE OF ANTITHROMBOTICS/ANTIPLATELETS: ICD-10-CM

## 2021-08-21 LAB
ALBUMIN SERPL ELPH-MCNC: 3.9 G/DL — SIGNIFICANT CHANGE UP (ref 3.3–5)
ALP SERPL-CCNC: 49 U/L — SIGNIFICANT CHANGE UP (ref 40–120)
ALT FLD-CCNC: 41 U/L — SIGNIFICANT CHANGE UP (ref 12–78)
ANION GAP SERPL CALC-SCNC: 5 MMOL/L — SIGNIFICANT CHANGE UP (ref 5–17)
AST SERPL-CCNC: 29 U/L — SIGNIFICANT CHANGE UP (ref 15–37)
BASOPHILS # BLD AUTO: 0.04 K/UL — SIGNIFICANT CHANGE UP (ref 0–0.2)
BASOPHILS NFR BLD AUTO: 0.3 % — SIGNIFICANT CHANGE UP (ref 0–2)
BILIRUB SERPL-MCNC: 0.6 MG/DL — SIGNIFICANT CHANGE UP (ref 0.2–1.2)
BUN SERPL-MCNC: 20 MG/DL — SIGNIFICANT CHANGE UP (ref 7–23)
CALCIUM SERPL-MCNC: 9.2 MG/DL — SIGNIFICANT CHANGE UP (ref 8.5–10.1)
CHLORIDE SERPL-SCNC: 107 MMOL/L — SIGNIFICANT CHANGE UP (ref 96–108)
CO2 SERPL-SCNC: 28 MMOL/L — SIGNIFICANT CHANGE UP (ref 22–31)
CREAT SERPL-MCNC: 1.01 MG/DL — SIGNIFICANT CHANGE UP (ref 0.5–1.3)
EOSINOPHIL # BLD AUTO: 0.01 K/UL — SIGNIFICANT CHANGE UP (ref 0–0.5)
EOSINOPHIL NFR BLD AUTO: 0.1 % — SIGNIFICANT CHANGE UP (ref 0–6)
GLUCOSE SERPL-MCNC: 140 MG/DL — HIGH (ref 70–99)
HCT VFR BLD CALC: 49.6 % — SIGNIFICANT CHANGE UP (ref 39–50)
HGB BLD-MCNC: 16.3 G/DL — SIGNIFICANT CHANGE UP (ref 13–17)
IMM GRANULOCYTES NFR BLD AUTO: 0.5 % — SIGNIFICANT CHANGE UP (ref 0–1.5)
LYMPHOCYTES # BLD AUTO: 0.7 K/UL — LOW (ref 1–3.3)
LYMPHOCYTES # BLD AUTO: 5.4 % — LOW (ref 13–44)
MCHC RBC-ENTMCNC: 32.3 PG — SIGNIFICANT CHANGE UP (ref 27–34)
MCHC RBC-ENTMCNC: 32.9 GM/DL — SIGNIFICANT CHANGE UP (ref 32–36)
MCV RBC AUTO: 98.2 FL — SIGNIFICANT CHANGE UP (ref 80–100)
MONOCYTES # BLD AUTO: 0.94 K/UL — HIGH (ref 0–0.9)
MONOCYTES NFR BLD AUTO: 7.2 % — SIGNIFICANT CHANGE UP (ref 2–14)
NEUTROPHILS # BLD AUTO: 11.25 K/UL — HIGH (ref 1.8–7.4)
NEUTROPHILS NFR BLD AUTO: 86.5 % — HIGH (ref 43–77)
PLATELET # BLD AUTO: 159 K/UL — SIGNIFICANT CHANGE UP (ref 150–400)
POTASSIUM SERPL-MCNC: 4.2 MMOL/L — SIGNIFICANT CHANGE UP (ref 3.5–5.3)
POTASSIUM SERPL-SCNC: 4.2 MMOL/L — SIGNIFICANT CHANGE UP (ref 3.5–5.3)
PROT SERPL-MCNC: 7.1 GM/DL — SIGNIFICANT CHANGE UP (ref 6–8.3)
RBC # BLD: 5.05 M/UL — SIGNIFICANT CHANGE UP (ref 4.2–5.8)
RBC # FLD: 14 % — SIGNIFICANT CHANGE UP (ref 10.3–14.5)
SODIUM SERPL-SCNC: 140 MMOL/L — SIGNIFICANT CHANGE UP (ref 135–145)
TROPONIN I SERPL-MCNC: <0.015 NG/ML — SIGNIFICANT CHANGE UP (ref 0.01–0.04)
WBC # BLD: 13.01 K/UL — HIGH (ref 3.8–10.5)
WBC # FLD AUTO: 13.01 K/UL — HIGH (ref 3.8–10.5)

## 2021-08-21 PROCEDURE — 99284 EMERGENCY DEPT VISIT MOD MDM: CPT | Mod: 25

## 2021-08-21 PROCEDURE — 85025 COMPLETE CBC W/AUTO DIFF WBC: CPT

## 2021-08-21 PROCEDURE — 73590 X-RAY EXAM OF LOWER LEG: CPT | Mod: 50

## 2021-08-21 PROCEDURE — 36415 COLL VENOUS BLD VENIPUNCTURE: CPT

## 2021-08-21 PROCEDURE — 73090 X-RAY EXAM OF FOREARM: CPT | Mod: LT

## 2021-08-21 PROCEDURE — 73590 X-RAY EXAM OF LOWER LEG: CPT | Mod: 26,50

## 2021-08-21 PROCEDURE — 93010 ELECTROCARDIOGRAM REPORT: CPT

## 2021-08-21 PROCEDURE — 84484 ASSAY OF TROPONIN QUANT: CPT

## 2021-08-21 PROCEDURE — 80053 COMPREHEN METABOLIC PANEL: CPT

## 2021-08-21 PROCEDURE — 73090 X-RAY EXAM OF FOREARM: CPT | Mod: 26,LT

## 2021-08-21 PROCEDURE — 93005 ELECTROCARDIOGRAM TRACING: CPT

## 2021-08-21 PROCEDURE — 99285 EMERGENCY DEPT VISIT HI MDM: CPT
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
## 2021-08-21 NOTE — ED STATDOCS - PATIENT PORTAL LINK FT
You can access the FollowMyHealth Patient Portal offered by [facility] by registering at the following website: [email or web address]. By joining [employer]’s FollowMyHealth portal, you will also be able to view your health information using other applications (apps) compatible with our system.

## 2021-08-21 NOTE — ED ADULT TRIAGE NOTE - CHIEF COMPLAINT QUOTE
Pt. to the ED BIB Spouse C/O  Wounds to the Left Arm and Bilateral Legs from a Trip and Fall - Pt. denies hitting head and LOC- Patient states " I am not here for a fall, I am to have my Wounds cleaned"-- + Cardiac hx ( On Xarelto)--GSC 15-- Hx. of HTN, UC, Blood Clots and DM-- Pt. on Steroids

## 2021-08-21 NOTE — ED STATDOCS - CROS ED PSYCH ALL NEG
New diagnosis 6/25. Initially admitted 6/23-6/25 for thrombocytopenia, probably ITP as diagnosis. Came to ED 7/4 for thrombocytopenia on outpatient rheum labs, discharged after one dose of dexamethasone 40 mg in ED with 4 day course total. Now came back to ED 7/5 for thrombocytopenia "plt <1" as per patient on outpatient heme/onc labs. In ED, received another dose of dexamethasone 40mg and 65mg IVIG.     Heme/onc recs:  -C/w IV Decadron 40mg qD (Day 3 of 4)  -C/w IVIG 65mg qD (Day 2 of 2)  -Rheum consult in setting of thrombocytopenia and joint stiffness  -H pylori antibody  -Lyme PCR  -Ehrlichia/Anaplasmosis PCR  -Tick borne disease panel  -US abdomen to eval for hepatosplenomegaly  -Monitor CBC w/ diff  -Maintain active T + S, transfuse platelets if < 10K or < 20K if febrile or < 50K if clinically important bleeding  -Upon improvement and stabilization of platelets > 50K, can discharge to see Dr. Azalia Schrader at [facility] [address] [place] [address] () in 1 week Initially admitted 6/23-6/25 for thrombocytopenia, probably ITP as diagnosis. Came to ED 7/4 for thrombocytopenia on outpatient rheum labs, discharged after one dose of dexamethasone 40 mg in ED with 4 day course total. Now came back to ED 7/5 for thrombocytopenia "plt <1" as per patient on outpatient heme/onc labs. In ED, received another dose of dexamethasone 40mg and 65mg IVIG.   -Heme/onc recs  -C/w IV Decadron 40mg qD (Day 3 of 4)  -C/w IVIG 65mg qD (Day 2 of 2)  -Rheum consult in setting of thrombocytopenia and joint stiffness  -H pylori antibody  -Lyme PCR  -Ehrlichia/Anaplasmosis PCR  -Tick borne disease panel  -US abdomen to eval for hepatosplenomegaly  -Monitor CBC w/ diff  -Maintain active T + S, transfuse platelets if < 10K or < 20K if febrile or < 50K if clinically important bleeding  -Upon improvement and stabilization of platelets > 50K, f/u with pt outpatient heme/onc Dr. Fulton in 1 week negative...

## 2021-08-21 NOTE — ED STATDOCS - ATTENDING CONTRIBUTION TO CARE
I Erick Akers MD saw and examined the patient. MLP saw and examined the patient under my supervision. I discussed the care of the patient with MLP and agree with MLP's plan, assessment and care of the patient while in the ED.

## 2021-08-21 NOTE — ED ADULT NURSE NOTE - OBJECTIVE STATEMENT
pt presents to the ED for wound check. Pt reports he was recently admitted at [facility] ED 3 weeks ago for cellulitis. Today, reports he was outside and had a mechanical trip and fall, falling on his driveway. Denies head injury or LOC. Presents to the ED to get his wounds cleaned out on B/L upper and lower extremities to prevent his susceptibility to cellulitis. Pt also requesting to be put on doxycycline; states he was on it last time for cellulitis and that it helped improve his symptoms.

## 2021-08-21 NOTE — CHART NOTE - NSCHARTNOTEFT_GEN_A_CORE
Chart reviewed. Asked by RN to meet with patient to discuss DC planning. Patient was having the skin tears on left arm cleaned and dressed by PA. We discussed the fact that his wife would be able to dress his wounds as PA stated there is no skilled need, so patient does not qualify for home care. The patient verbalized understanding. The primary RN will provide patient with initial bandaging supplies and bacitracin. Patient was appreciative of interview.

## 2021-08-21 NOTE — ED STATDOCS - NEUROLOGICAL, MLM
sensation is normal and strength is normal. sensation is normal and strength is normal. no laxity of wrist or elbow. pt able to move all digits. able to touch his thumb with all digits sensation is normal and strength is normal. no laxity of wrist or elbow or shoulder, knees, ankles or hips. pt able to move all digits at DIP, PIP and MCP, as well as able to move all toes. able to touch his thumb with all digits. Cap refill less than 2 seconds. neurovascularly intact in all limbs.

## 2021-08-21 NOTE — ED STATDOCS - SKIN, MLM
skin normal color for race, warm, dry and intact. skin normal color for race, warm, dry.  multiple abrasion on B/L knees and tib fib. multiple abrasion to L UE, mostly avulsion abrasions. biggest is about 2cm in length. no active bleeding. no deep laceration. 2+ pulses DP in radial arteries. skin normal color for race, warm, dry.  multiple abrasion on B/L knees and tib fib. multiple abrasion to L UE, mostly superficial avulsion abrasions. biggest is about 2cm in length. no active bleeding. no deep laceration. 2+ pulses DP in radial arteries.

## 2021-08-21 NOTE — ED STATDOCS - CARE PLAN
Principal Discharge DX:	Multiple skin tears  Secondary Diagnosis:	Fall on concrete   1 Principal Discharge DX:	Multiple skin tears  Goal:	abrasion, follow up with PMD, hygenic care, strict return precautions if any evidence of infection provided.  Secondary Diagnosis:	Fall on concrete

## 2021-08-21 NOTE — ED STATDOCS - CLINICAL SUMMARY MEDICAL DECISION MAKING FREE TEXT BOX
x ray of the area of abrasions to ensure that there is no foreign bodies. basic blood work and 1 set of troponin because pt has a high risk of infection due to being on steroids and DM. so will place him to antibiotics  and do lab work to assess kidney and liver function. pt on Xarelto states that he fell, fall is purely mechanical. does not worry about cardiovascular causes. has no CP, SOB, palpitations. so will not do CT of head and neck because he did not have any impact to head and neck will not do any extensive workup at pt's wish. x ray of the area of abrasions to ensure that there is no foreign bodies. basic blood work and 1 set of troponin because pt has a high risk of infection due to being on steroids and DM. so will place him to antibiotics  and do lab work to assess kidney and liver function. pt on Xarelto states that he fell, fall is purely mechanical. does not worry about cardiovascular causes (offered further work up but declines stating likely mechanical fall). Currenty has no CP, SOB, palpitations. so will not do CT of head and neck because he did not have any impact to head and neck will not do any extensive workup at pt's wish.

## 2021-08-21 NOTE — ED STATDOCS - NSFOLLOWUPINSTRUCTIONS_ED_ALL_ED_FT
KEEP DRESSING ON X 48 HOURS. AFTERWARDS CLEAN WITH RUNNING SOAPY WATER, COVER WITH BACITRACIN AND GAUZE UNTIL HEALED. TAKE DOXYCYCLINE TO COMPLETION. DO NOT TAKE BATHS UNTIL WOUNDS HEALED.     Laceration    WHAT YOU NEED TO KNOW:    A laceration is an injury to the skin and the soft tissue underneath it. Lacerations happen when you are cut or hit by something. They can happen anywhere on the body.     DISCHARGE INSTRUCTIONS:    Return to the emergency department if:     You have heavy bleeding or bleeding that does not stop after 10 minutes of holding firm, direct pressure over the wound.       Your wound opens up.     Contact your healthcare provider if:     You have a fever or chills.       Your laceration is red, warm, or swollen.      You have red streaks on your skin coming from your wound.      You have white or yellow drainage from the wound that smells bad.      You have pain that gets worse, even after treatment.       You have questions or concerns about your condition or care.     Medicines:     Prescription pain medicine may be given. Ask how to take this medicine safely.       Antibiotics help treat or prevent a bacterial infection.       Take your medicine as directed. Contact your healthcare provider if you think your medicine is not helping or if you have side effects. Tell him or her if you are allergic to any medicine. Keep a list of the medicines, vitamins, and herbs you take. Include the amounts, and when and why you take them. Bring the list or the pill bottles to follow-up visits. Carry your medicine list with you in case of an emergency.    Care for your wound as directed:     Do not get your wound wet until your healthcare provider says it is okay. Do not soak your wound in water. Do not go swimming until your healthcare provider says it is okay. Carefully wash the wound with soap and water. Gently pat the area dry or allow it to air dry.       Change your bandages when they get wet, dirty, or after washing. Apply new, clean bandages as directed. Do not apply elastic bandages or tape too tight. Do not put powders or lotions over your incision.       Apply antibiotic ointment as directed. Your healthcare provider may give you antibiotic ointment to put over your wound if you have stitches. If you have strips of tape over your incision, let them dry up and fall off on their own. If they do not fall off within 14 days, gently remove them. If you have glue over your wound, do not remove or pick at it. If your glue comes off, do not replace it with glue that you have at home.       Check your wound every day for signs of infection such as swelling, redness, or pus.     Self-care:     Apply ice on your wound for 15 to 20 minutes every hour or as directed. Use an ice pack, or put crushed ice in a plastic bag. Cover it with a towel. Ice helps prevent tissue damage and decreases swelling and pain.      Use a splint as directed. A splint will decrease movement and stress on your wound. It may help it heal faster. A splint may be used for lacerations over joints or areas of your body that bend. Ask your healthcare provider how to apply and remove a splint.       Decrease scarring of your wound by applying ointments as directed. Do not apply ointments until your healthcare provider says it is okay. You may need to wait until your wound is healed. Ask which ointment to buy and how often to use it. After your wound is healed, use sunscreen over the area when you are out in the sun. You should do this for at least 6 months to 1 year after your injury.     Follow up with your healthcare provider as directed: You may need to follow up in 24 to 48 hours to have your wound checked for infection. You will need to return in 3 to 14 days if you have stitches or staples so they can be removed. Care for your wound as directed to prevent infection and help it heal. Write down your questions so you remember to ask them during your visits.

## 2021-08-21 NOTE — ED STATDOCS - CHPI ED SYMPTOM NEG
no fever no burning urination/no fever/no hematuria/no abdominal distention/no blood in stool/no diarrhea/no dysuria

## 2021-08-21 NOTE — ED STATDOCS - NS_ ATTENDINGSCRIBEDETAILS _ED_A_ED_FT
I Erick Akers MD saw and examined the patient. Scribe documented for me and under my supervision. I have modified the scribe's documentation where necessary to reflect my history, physical exam and other relevant documentations pertinent to the care of the patient.

## 2021-08-21 NOTE — ED STATDOCS - PLAN OF CARE
abrasion, follow up with PMD, hygenic care, strict return precautions if any evidence of infection provided.

## 2021-08-21 NOTE — ED STATDOCS - OBJECTIVE STATEMENT
70 y/o male with a PMHx of Crohn's disease, DVT, HTN, DM, DVT on Xarelto presents to the ED for wound check. No other complaints at this time. Pt reports he was at [facility] ED 3 weeks ago for cellulitis. Today, reports he was outside and was carrying something and tripped over the stoop, falling on his driveway; came in to get his wounds cleaned our on B/L upper and lower extremities to prevent cellulitis. Denies lightheadedness, fever, chills. 72 y/o male with a PMHx of Crohn's disease, HLD, HTN, DM, DVT on Xarelto, inguinal hernia presents to the ED for wound check. Pt reports he was recently admitted at [facility] ED 3 weeks ago for cellulitis. Today, reports he was outside and had a mechanical trip and fall, falling on his driveway. Denies head injury or LOC. Presents to the ED to get his wounds cleaned out on B/L upper and lower extremities to prevent his susceptibility to cellulitis. Pt also requesting to be put on doxycycline; states he was on it last time for cellulitis and that it helped improve his symptoms. Denies lightheadedness, fever, chills. COVID vaccinated. PMD: Dr. Elisha Churchill 72 y/o male with a PMHx of Crohn's disease, HLD, HTN, DM, DVT on Xarelto, inguinal hernia presents to the ED for wound check. Pt reports he was recently admitted at [facility] ED 3 weeks ago for cellulitis. Today, reports he was outside and had a mechanical trip and fall, falling on his driveway. Denies head injury or LOC. Presents to the ED to get his wounds cleaned out on B/L upper and lower extremities to prevent his susceptibility to cellulitis. Pt also requesting to be put on doxycycline; states he was on it last time for cellulitis and that it helped improve his symptoms. Denies lightheadedness, fever, chills. COVID-19 vaccinated. No neck pain or stiffness. No skin rash. No melena or hematochezia. No dysuria hematuria or frequency. No recent exotic travel. No recent trauma. PMD: Dr. Elisha Churchill

## 2021-08-21 NOTE — ED STATDOCS - CARDIAC, MLM
normal rate, regular rhythm, and no murmur. normal rate, regular rhythm, and no gallops or rubs. 2+ pulses in bilateral dp and radial arteries. Cap refill less than 3 seconds.

## 2021-08-24 ENCOUNTER — APPOINTMENT (OUTPATIENT)
Dept: GASTROENTEROLOGY | Facility: CLINIC | Age: 71
End: 2021-08-24

## 2021-08-24 ENCOUNTER — TRANSCRIPTION ENCOUNTER (OUTPATIENT)
Age: 71
End: 2021-08-24

## 2021-08-25 ENCOUNTER — NON-APPOINTMENT (OUTPATIENT)
Age: 71
End: 2021-08-25

## 2021-08-25 ENCOUNTER — TRANSCRIPTION ENCOUNTER (OUTPATIENT)
Age: 71
End: 2021-08-25

## 2021-08-31 ENCOUNTER — NON-APPOINTMENT (OUTPATIENT)
Age: 71
End: 2021-08-31

## 2021-09-02 ENCOUNTER — NON-APPOINTMENT (OUTPATIENT)
Age: 71
End: 2021-09-02

## 2021-09-03 ENCOUNTER — APPOINTMENT (OUTPATIENT)
Dept: GASTROENTEROLOGY | Facility: CLINIC | Age: 71
End: 2021-09-03

## 2021-09-03 ENCOUNTER — TRANSCRIPTION ENCOUNTER (OUTPATIENT)
Age: 71
End: 2021-09-03

## 2021-09-07 RX ORDER — VEDOLIZUMAB 300 MG/5ML
300 INJECTION, POWDER, LYOPHILIZED, FOR SOLUTION INTRAVENOUS
Qty: 0 | Refills: 0 | Status: COMPLETED | OUTPATIENT
Start: 2021-09-07 | End: 1900-01-01

## 2021-09-08 ENCOUNTER — NON-APPOINTMENT (OUTPATIENT)
Age: 71
End: 2021-09-08

## 2021-09-09 ENCOUNTER — TRANSCRIPTION ENCOUNTER (OUTPATIENT)
Age: 71
End: 2021-09-09

## 2021-09-12 ENCOUNTER — TRANSCRIPTION ENCOUNTER (OUTPATIENT)
Age: 71
End: 2021-09-12

## 2021-09-13 ENCOUNTER — APPOINTMENT (OUTPATIENT)
Dept: RHEUMATOLOGY | Facility: CLINIC | Age: 71
End: 2021-09-13
Payer: COMMERCIAL

## 2021-09-13 VITALS
RESPIRATION RATE: 18 BRPM | DIASTOLIC BLOOD PRESSURE: 79 MMHG | HEART RATE: 79 BPM | SYSTOLIC BLOOD PRESSURE: 141 MMHG | OXYGEN SATURATION: 99 %

## 2021-09-13 VITALS
DIASTOLIC BLOOD PRESSURE: 80 MMHG | RESPIRATION RATE: 18 BRPM | HEART RATE: 92 BPM | TEMPERATURE: 97.1 F | OXYGEN SATURATION: 97 % | SYSTOLIC BLOOD PRESSURE: 139 MMHG

## 2021-09-13 VITALS
OXYGEN SATURATION: 100 % | DIASTOLIC BLOOD PRESSURE: 87 MMHG | SYSTOLIC BLOOD PRESSURE: 142 MMHG | RESPIRATION RATE: 18 BRPM | HEART RATE: 79 BPM

## 2021-09-13 PROCEDURE — 96413 CHEMO IV INFUSION 1 HR: CPT

## 2021-09-13 RX ORDER — ACETAMINOPHEN 325 MG/1
325 TABLET ORAL
Qty: 0 | Refills: 0 | Status: COMPLETED | OUTPATIENT
Start: 2021-09-13 | End: 1900-01-01

## 2021-09-13 RX ORDER — ACETAMINOPHEN 325 MG/1
325 TABLET ORAL
Qty: 0 | Refills: 0 | Status: COMPLETED | OUTPATIENT
Start: 2021-09-13

## 2021-09-13 RX ORDER — CETIRIZINE HYDROCHLORIDE 10 MG/1
10 TABLET, COATED ORAL
Qty: 0 | Refills: 0 | Status: COMPLETED | OUTPATIENT
Start: 2021-09-13 | End: 1900-01-01

## 2021-09-13 RX ORDER — VEDOLIZUMAB 300 MG/5ML
300 INJECTION, POWDER, LYOPHILIZED, FOR SOLUTION INTRAVENOUS
Qty: 0 | Refills: 0 | Status: COMPLETED | OUTPATIENT
Start: 2021-09-07

## 2021-09-13 RX ORDER — CETIRIZINE HYDROCHLORIDE 10 MG/1
10 TABLET, COATED ORAL
Qty: 0 | Refills: 0 | Status: COMPLETED | OUTPATIENT
Start: 2021-09-13

## 2021-09-27 ENCOUNTER — APPOINTMENT (OUTPATIENT)
Dept: RHEUMATOLOGY | Facility: CLINIC | Age: 71
End: 2021-09-27
Payer: COMMERCIAL

## 2021-09-27 VITALS
SYSTOLIC BLOOD PRESSURE: 152 MMHG | OXYGEN SATURATION: 99 % | HEART RATE: 89 BPM | DIASTOLIC BLOOD PRESSURE: 92 MMHG | TEMPERATURE: 97.6 F | RESPIRATION RATE: 16 BRPM

## 2021-09-27 VITALS
SYSTOLIC BLOOD PRESSURE: 138 MMHG | DIASTOLIC BLOOD PRESSURE: 78 MMHG | OXYGEN SATURATION: 98 % | HEART RATE: 97 BPM | RESPIRATION RATE: 16 BRPM

## 2021-09-27 PROCEDURE — 96413 CHEMO IV INFUSION 1 HR: CPT

## 2021-09-27 RX ORDER — VEDOLIZUMAB 300 MG/5ML
300 INJECTION, POWDER, LYOPHILIZED, FOR SOLUTION INTRAVENOUS
Qty: 0 | Refills: 0 | Status: COMPLETED | OUTPATIENT
Start: 2021-09-21

## 2021-09-27 RX ORDER — ACETAMINOPHEN 325 MG/1
325 TABLET ORAL
Qty: 0 | Refills: 0 | Status: COMPLETED | OUTPATIENT
Start: 2021-09-21

## 2021-09-27 RX ORDER — CETIRIZINE HYDROCHLORIDE 10 MG/1
10 TABLET, COATED ORAL
Qty: 0 | Refills: 0 | Status: COMPLETED | OUTPATIENT
Start: 2021-09-21

## 2021-10-07 ENCOUNTER — APPOINTMENT (OUTPATIENT)
Dept: GASTROENTEROLOGY | Facility: CLINIC | Age: 71
End: 2021-10-07
Payer: COMMERCIAL

## 2021-10-07 VITALS
BODY MASS INDEX: 35.82 KG/M2 | TEMPERATURE: 98.8 F | WEIGHT: 215 LBS | SYSTOLIC BLOOD PRESSURE: 127 MMHG | HEIGHT: 65 IN | HEART RATE: 100 BPM | DIASTOLIC BLOOD PRESSURE: 75 MMHG | OXYGEN SATURATION: 98 %

## 2021-10-07 DIAGNOSIS — Z79.52 LONG TERM (CURRENT) USE OF SYSTEMIC STEROIDS: ICD-10-CM

## 2021-10-07 DIAGNOSIS — R19.5 OTHER FECAL ABNORMALITIES: ICD-10-CM

## 2021-10-07 DIAGNOSIS — K57.30 DIVERTICULOSIS OF LARGE INTESTINE W/OUT PERFORATION OR ABSCESS W/OUT BLEEDING: ICD-10-CM

## 2021-10-07 DIAGNOSIS — K62.5 HEMORRHAGE OF ANUS AND RECTUM: ICD-10-CM

## 2021-10-07 DIAGNOSIS — Z87.19 PERSONAL HISTORY OF OTHER DISEASES OF THE DIGESTIVE SYSTEM: ICD-10-CM

## 2021-10-07 DIAGNOSIS — Z87.898 PERSONAL HISTORY OF OTHER SPECIFIED CONDITIONS: ICD-10-CM

## 2021-10-07 PROCEDURE — 99213 OFFICE O/P EST LOW 20 MIN: CPT

## 2021-10-07 RX ORDER — BUDESONIDE 3 MG/1
3 CAPSULE, COATED PELLETS ORAL DAILY
Qty: 270 | Refills: 3 | Status: DISCONTINUED | COMMUNITY
Start: 2021-06-02 | End: 2021-10-07

## 2021-10-07 RX ORDER — ACYCLOVIR 200 MG/1
200 CAPSULE ORAL
Refills: 0 | Status: DISCONTINUED | COMMUNITY
Start: 2021-07-30 | End: 2021-10-07

## 2021-10-07 NOTE — PHYSICAL EXAM
[General Appearance - Alert] : alert [General Appearance - In No Acute Distress] : in no acute distress [General Appearance - Well Nourished] : well nourished [General Appearance - Well Developed] : well developed [Sclera] : the sclera and conjunctiva were normal [Outer Ear] : the ears and nose were normal in appearance [Neck Appearance] : the appearance of the neck was normal [Respiration, Rhythm And Depth] : normal respiratory rhythm and effort [Exaggerated Use Of Accessory Muscles For Inspiration] : no accessory muscle use [Auscultation Breath Sounds / Voice Sounds] : lungs were clear to auscultation bilaterally [Apical Impulse] : the apical impulse was normal [Heart Rate And Rhythm] : heart rate was normal and rhythm regular [Heart Sounds Gallop] : no gallops [Edema] : there was no peripheral edema [Bowel Sounds] : normal bowel sounds [Abdomen Soft] : soft [Abdomen Tenderness] : non-tender [Abnormal Walk] : normal gait [Skin Color & Pigmentation] : normal skin color and pigmentation [Skin Turgor] : normal skin turgor [] : no rash [Impaired Insight] : insight and judgment were intact [Oriented To Time, Place, And Person] : oriented to person, place, and time [Affect] : the affect was normal [Mood] : the mood was normal

## 2021-10-08 PROBLEM — Z79.52 CURRENT CHRONIC USE OF SYSTEMIC STEROIDS: Status: ACTIVE | Noted: 2021-06-08

## 2021-10-08 PROBLEM — R19.5 GUAIAC + STOOL: Status: RESOLVED | Noted: 2021-04-27 | Resolved: 2021-06-02

## 2021-10-08 PROBLEM — Z87.898 HISTORY OF DIARRHEA: Status: RESOLVED | Noted: 2019-04-24 | Resolved: 2021-10-08

## 2021-10-08 PROBLEM — K62.5 RECTAL BLEEDING: Status: ACTIVE | Noted: 2021-04-27

## 2021-10-08 PROBLEM — Z87.19 HISTORY OF HIATAL HERNIA: Status: RESOLVED | Noted: 2019-04-24 | Resolved: 2021-10-08

## 2021-10-08 PROBLEM — K57.30 DIVERTICULOSIS OF COLON: Status: RESOLVED | Noted: 2019-04-24 | Resolved: 2021-10-08

## 2021-10-08 PROBLEM — Z87.19 HISTORY OF HEMORRHOIDS: Status: RESOLVED | Noted: 2019-04-24 | Resolved: 2021-10-08

## 2021-10-08 NOTE — ASSESSMENT
[FreeTextEntry1] : 71 year old male with chronic ulcerative colitis got  2nd  loading dose Entyvio infusion on 9/27/21\par  He is off Budesonide and on nubia down the to Prednisone 5 mg daily since 1st week August. \par Dr. Harris came in answered their all questions recommended to taper down the Prednisone to 5 mg every other day and gradually Off.\par Plan \par Continue Entyvio infusion 3rd loading dose on 10/15/2021\par Taper off the Prednisone\par Continue Mesalamine 4 tab daily\par Continue Mesalamine suppository daily. \par  F/U OV 3-4 months earlier if needed.

## 2021-10-08 NOTE — HISTORY OF PRESENT ILLNESS
[de-identified] : 71-year old male, 20-year history of inflammatory bowel disease, at different times diagnosed with Crohn's disease versus ulcerative colitis. His symptoms stabilized since starting the Entyvio infusion he got the 2nd Entyvio loading dose on 9/27/21. \par He is able to taper down the Prednisone down to 5 mg daily,since last week with plan to take\par  5 mg every other day for 1-2 week than off Prednisone. \par He is  taking Mesalamine suppository daily and Lialda 4 tabs daily. \par He denies abdominal pain, nausea, vomiting , diarrhea or constipation. He denies, hematochezia, melena or mucus in stool.No Extra GI symptoms. His weight is stable, gaining .  He has 1-2 bowel formed bowel movement daily.\par \par Prior treatment failure 6 MP , high dose Prednisone \par Most recent colonoscopies and biopsies April 2021 consistent with left-sided ulcerative colitis\par

## 2021-10-22 ENCOUNTER — NON-APPOINTMENT (OUTPATIENT)
Age: 71
End: 2021-10-22

## 2021-10-25 ENCOUNTER — APPOINTMENT (OUTPATIENT)
Dept: RHEUMATOLOGY | Facility: CLINIC | Age: 71
End: 2021-10-25
Payer: COMMERCIAL

## 2021-10-25 VITALS
OXYGEN SATURATION: 96 % | SYSTOLIC BLOOD PRESSURE: 125 MMHG | DIASTOLIC BLOOD PRESSURE: 74 MMHG | HEART RATE: 95 BPM | RESPIRATION RATE: 17 BRPM | TEMPERATURE: 97.2 F

## 2021-10-25 VITALS
OXYGEN SATURATION: 98 % | SYSTOLIC BLOOD PRESSURE: 135 MMHG | HEART RATE: 86 BPM | RESPIRATION RATE: 16 BRPM | DIASTOLIC BLOOD PRESSURE: 71 MMHG

## 2021-10-25 PROCEDURE — 96413 CHEMO IV INFUSION 1 HR: CPT

## 2021-10-25 RX ORDER — CETIRIZINE HYDROCHLORIDE 10 MG/1
10 TABLET, COATED ORAL
Qty: 0 | Refills: 0 | Status: COMPLETED | OUTPATIENT
Start: 2021-10-19

## 2021-10-25 RX ORDER — ACETAMINOPHEN 325 MG/1
325 TABLET ORAL
Qty: 0 | Refills: 0 | Status: COMPLETED | OUTPATIENT
Start: 2021-10-19

## 2021-10-25 RX ORDER — VEDOLIZUMAB 300 MG/5ML
300 INJECTION, POWDER, LYOPHILIZED, FOR SOLUTION INTRAVENOUS
Qty: 0 | Refills: 0 | Status: COMPLETED | OUTPATIENT
Start: 2021-10-19

## 2021-11-01 ENCOUNTER — TRANSCRIPTION ENCOUNTER (OUTPATIENT)
Age: 71
End: 2021-11-01

## 2021-11-03 ENCOUNTER — OUTPATIENT (OUTPATIENT)
Dept: OUTPATIENT SERVICES | Facility: HOSPITAL | Age: 71
LOS: 1 days | End: 2021-11-03
Payer: COMMERCIAL

## 2021-11-03 ENCOUNTER — TRANSCRIPTION ENCOUNTER (OUTPATIENT)
Age: 71
End: 2021-11-03

## 2021-11-03 ENCOUNTER — APPOINTMENT (OUTPATIENT)
Dept: ULTRASOUND IMAGING | Facility: CLINIC | Age: 71
End: 2021-11-03
Payer: COMMERCIAL

## 2021-11-03 DIAGNOSIS — Z00.8 ENCOUNTER FOR OTHER GENERAL EXAMINATION: ICD-10-CM

## 2021-11-03 PROCEDURE — 93970 EXTREMITY STUDY: CPT

## 2021-11-03 PROCEDURE — 93970 EXTREMITY STUDY: CPT | Mod: 26

## 2021-11-05 ENCOUNTER — TRANSCRIPTION ENCOUNTER (OUTPATIENT)
Age: 71
End: 2021-11-05

## 2021-11-15 ENCOUNTER — TRANSCRIPTION ENCOUNTER (OUTPATIENT)
Age: 71
End: 2021-11-15

## 2021-11-16 ENCOUNTER — NON-APPOINTMENT (OUTPATIENT)
Age: 71
End: 2021-11-16

## 2021-11-22 ENCOUNTER — APPOINTMENT (OUTPATIENT)
Dept: VASCULAR SURGERY | Facility: CLINIC | Age: 71
End: 2021-11-22

## 2021-11-22 VITALS — OXYGEN SATURATION: 98 % | HEART RATE: 105 BPM | SYSTOLIC BLOOD PRESSURE: 123 MMHG | DIASTOLIC BLOOD PRESSURE: 67 MMHG

## 2021-12-14 RX ORDER — VEDOLIZUMAB 300 MG/5ML
300 INJECTION, POWDER, LYOPHILIZED, FOR SOLUTION INTRAVENOUS
Qty: 0 | Refills: 0 | Status: COMPLETED | OUTPATIENT
Start: 2021-12-13 | End: 1900-01-01

## 2021-12-14 RX ORDER — CETIRIZINE HYDROCHLORIDE 10 MG/1
10 TABLET, COATED ORAL
Qty: 0 | Refills: 0 | Status: COMPLETED | OUTPATIENT
Start: 2021-12-13 | End: 1900-01-01

## 2021-12-14 RX ORDER — ACETAMINOPHEN 325 MG/1
325 TABLET ORAL
Qty: 0 | Refills: 0 | Status: COMPLETED | OUTPATIENT
Start: 2021-12-13 | End: 1900-01-01

## 2021-12-20 ENCOUNTER — APPOINTMENT (OUTPATIENT)
Dept: RHEUMATOLOGY | Facility: CLINIC | Age: 71
End: 2021-12-20
Payer: COMMERCIAL

## 2021-12-20 VITALS
RESPIRATION RATE: 16 BRPM | DIASTOLIC BLOOD PRESSURE: 82 MMHG | SYSTOLIC BLOOD PRESSURE: 156 MMHG | OXYGEN SATURATION: 99 % | HEART RATE: 78 BPM

## 2021-12-20 VITALS
SYSTOLIC BLOOD PRESSURE: 154 MMHG | TEMPERATURE: 96.9 F | HEART RATE: 89 BPM | DIASTOLIC BLOOD PRESSURE: 87 MMHG | OXYGEN SATURATION: 96 % | RESPIRATION RATE: 16 BRPM

## 2021-12-20 PROCEDURE — 96413 CHEMO IV INFUSION 1 HR: CPT

## 2021-12-20 RX ORDER — CETIRIZINE HYDROCHLORIDE 10 MG/1
10 TABLET, COATED ORAL
Qty: 0 | Refills: 0 | Status: COMPLETED | OUTPATIENT
Start: 2021-12-14

## 2021-12-20 RX ORDER — ACETAMINOPHEN 325 MG/1
325 TABLET ORAL
Qty: 0 | Refills: 0 | Status: COMPLETED | OUTPATIENT
Start: 2021-12-14

## 2021-12-20 RX ORDER — VEDOLIZUMAB 300 MG/5ML
300 INJECTION, POWDER, LYOPHILIZED, FOR SOLUTION INTRAVENOUS
Qty: 0 | Refills: 0 | Status: COMPLETED | OUTPATIENT
Start: 2021-12-14

## 2022-02-01 ENCOUNTER — APPOINTMENT (OUTPATIENT)
Dept: GASTROENTEROLOGY | Facility: CLINIC | Age: 72
End: 2022-02-01
Payer: COMMERCIAL

## 2022-02-01 VITALS
WEIGHT: 206 LBS | HEART RATE: 84 BPM | SYSTOLIC BLOOD PRESSURE: 120 MMHG | OXYGEN SATURATION: 98 % | BODY MASS INDEX: 23.83 KG/M2 | DIASTOLIC BLOOD PRESSURE: 75 MMHG | HEIGHT: 78 IN | TEMPERATURE: 97.7 F

## 2022-02-01 PROCEDURE — 99214 OFFICE O/P EST MOD 30 MIN: CPT

## 2022-02-01 NOTE — HISTORY OF PRESENT ILLNESS
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

## 2022-02-01 NOTE — ASSESSMENT
[FreeTextEntry1] : 71-year-old male, pancolitis\par Prior failure, dependence of corticosteroid\par Distant failure of mercaptopurine\par Failure of mesalamine\par Doing extremely well since initiation of Entyvio\par Due for surveillance colonoscopy\par \par Plan\par Indications risks benefits and alternatives to colonoscopy reviewed\par Patient agreeable to examination\par Continue Entyvio every 8 weeks as ordered\par Patient and his wife will check in with infusion center on their way home

## 2022-02-09 NOTE — END OF VISIT
You were evaluated in the emergency department for headache, paresthesias, and left leg shaking. Your examination was reassuring as was your work-up including CT head. It will be important for you to follow-up with your primary care physician in 1-3 days. If you develop worsening symptoms such as worsening headache or weakness or numbness of any extremity, please return to the emergency department immediately. It was a pleasure taking care of you at [facility] Emergency Department today. We know that when you come to [facility], you are entrusting us with your health, comfort, and safety. Our physicians and nurses honor that trust, and we truly appreciate the opportunity to care for you and your loved ones. We also value our feedback. If you receive a survey about your Emergency Department experience today, please fill it out. We care about our patients' feedback, and we listen to what you have to say. Thank you!
[Time Spent: ___ minutes] : I have spent [unfilled] minutes of time on the encounter.

## 2022-02-14 ENCOUNTER — APPOINTMENT (OUTPATIENT)
Dept: RHEUMATOLOGY | Facility: CLINIC | Age: 72
End: 2022-02-14
Payer: COMMERCIAL

## 2022-02-14 VITALS
OXYGEN SATURATION: 100 % | SYSTOLIC BLOOD PRESSURE: 154 MMHG | DIASTOLIC BLOOD PRESSURE: 78 MMHG | HEART RATE: 87 BPM | TEMPERATURE: 96.9 F | RESPIRATION RATE: 16 BRPM

## 2022-02-14 VITALS
RESPIRATION RATE: 16 BRPM | SYSTOLIC BLOOD PRESSURE: 148 MMHG | DIASTOLIC BLOOD PRESSURE: 80 MMHG | HEART RATE: 79 BPM | OXYGEN SATURATION: 100 %

## 2022-02-14 PROCEDURE — 96413 CHEMO IV INFUSION 1 HR: CPT

## 2022-02-14 RX ORDER — VEDOLIZUMAB 300 MG/5ML
300 INJECTION, POWDER, LYOPHILIZED, FOR SOLUTION INTRAVENOUS
Qty: 0 | Refills: 0 | Status: COMPLETED | OUTPATIENT
Start: 2022-02-08

## 2022-02-14 RX ORDER — ACETAMINOPHEN 325 MG/1
325 TABLET ORAL
Qty: 0 | Refills: 0 | Status: COMPLETED | OUTPATIENT
Start: 2022-02-08

## 2022-02-14 RX ORDER — CETIRIZINE HYDROCHLORIDE 10 MG/1
10 TABLET, FILM COATED ORAL
Qty: 0 | Refills: 0 | Status: COMPLETED | OUTPATIENT
Start: 2022-02-08

## 2022-04-13 ENCOUNTER — APPOINTMENT (OUTPATIENT)
Dept: RHEUMATOLOGY | Facility: CLINIC | Age: 72
End: 2022-04-13
Payer: COMMERCIAL

## 2022-04-13 ENCOUNTER — APPOINTMENT (OUTPATIENT)
Dept: RHEUMATOLOGY | Facility: CLINIC | Age: 72
End: 2022-04-13

## 2022-04-13 VITALS
DIASTOLIC BLOOD PRESSURE: 67 MMHG | OXYGEN SATURATION: 98 % | HEART RATE: 70 BPM | RESPIRATION RATE: 16 BRPM | SYSTOLIC BLOOD PRESSURE: 158 MMHG | TEMPERATURE: 96.9 F

## 2022-04-13 VITALS
OXYGEN SATURATION: 100 % | RESPIRATION RATE: 17 BRPM | HEART RATE: 81 BPM | SYSTOLIC BLOOD PRESSURE: 132 MMHG | DIASTOLIC BLOOD PRESSURE: 78 MMHG

## 2022-04-13 PROCEDURE — 96413 CHEMO IV INFUSION 1 HR: CPT

## 2022-04-13 RX ORDER — CETIRIZINE HYDROCHLORIDE 10 MG/1
10 TABLET, FILM COATED ORAL
Qty: 0 | Refills: 0 | Status: COMPLETED | OUTPATIENT
Start: 2022-04-05

## 2022-04-13 RX ORDER — ACETAMINOPHEN 325 MG/1
325 TABLET ORAL
Qty: 0 | Refills: 0 | Status: COMPLETED | OUTPATIENT
Start: 2022-04-05

## 2022-04-13 RX ORDER — VEDOLIZUMAB 300 MG/5ML
300 INJECTION, POWDER, LYOPHILIZED, FOR SOLUTION INTRAVENOUS
Qty: 0 | Refills: 0 | Status: COMPLETED | OUTPATIENT
Start: 2022-04-05

## 2022-05-11 ENCOUNTER — APPOINTMENT (OUTPATIENT)
Dept: GASTROENTEROLOGY | Facility: AMBULATORY MEDICAL SERVICES | Age: 72
End: 2022-05-11
Payer: COMMERCIAL

## 2022-05-11 PROCEDURE — 45380 COLONOSCOPY AND BIOPSY: CPT

## 2022-05-19 ENCOUNTER — NON-APPOINTMENT (OUTPATIENT)
Age: 72
End: 2022-05-19

## 2022-06-01 ENCOUNTER — NON-APPOINTMENT (OUTPATIENT)
Age: 72
End: 2022-06-01

## 2022-06-06 ENCOUNTER — APPOINTMENT (OUTPATIENT)
Dept: RHEUMATOLOGY | Facility: CLINIC | Age: 72
End: 2022-06-06
Payer: COMMERCIAL

## 2022-06-06 VITALS
TEMPERATURE: 97.1 F | HEART RATE: 78 BPM | SYSTOLIC BLOOD PRESSURE: 128 MMHG | RESPIRATION RATE: 16 BRPM | DIASTOLIC BLOOD PRESSURE: 78 MMHG | OXYGEN SATURATION: 100 %

## 2022-06-06 VITALS
HEART RATE: 77 BPM | OXYGEN SATURATION: 98 % | DIASTOLIC BLOOD PRESSURE: 78 MMHG | SYSTOLIC BLOOD PRESSURE: 133 MMHG | RESPIRATION RATE: 17 BRPM

## 2022-06-06 PROCEDURE — 96413 CHEMO IV INFUSION 1 HR: CPT

## 2022-06-06 RX ORDER — ACETAMINOPHEN 325 MG/1
325 TABLET ORAL
Qty: 0 | Refills: 0 | Status: COMPLETED | OUTPATIENT
Start: 2022-05-31

## 2022-06-06 RX ORDER — VEDOLIZUMAB 300 MG/5ML
300 INJECTION, POWDER, LYOPHILIZED, FOR SOLUTION INTRAVENOUS
Qty: 0 | Refills: 0 | Status: COMPLETED | OUTPATIENT
Start: 2022-05-31

## 2022-06-06 RX ORDER — CETIRIZINE HYDROCHLORIDE 10 MG/1
10 TABLET, FILM COATED ORAL
Qty: 0 | Refills: 0 | Status: COMPLETED | OUTPATIENT
Start: 2022-05-31

## 2022-06-08 ENCOUNTER — APPOINTMENT (OUTPATIENT)
Dept: RHEUMATOLOGY | Facility: CLINIC | Age: 72
End: 2022-06-08

## 2022-06-13 ENCOUNTER — NON-APPOINTMENT (OUTPATIENT)
Age: 72
End: 2022-06-13

## 2022-06-15 ENCOUNTER — APPOINTMENT (OUTPATIENT)
Dept: UROLOGY | Facility: CLINIC | Age: 72
End: 2022-06-15
Payer: COMMERCIAL

## 2022-06-15 VITALS
HEIGHT: 78 IN | WEIGHT: 195 LBS | SYSTOLIC BLOOD PRESSURE: 125 MMHG | OXYGEN SATURATION: 98 % | BODY MASS INDEX: 22.56 KG/M2 | DIASTOLIC BLOOD PRESSURE: 74 MMHG | HEART RATE: 88 BPM

## 2022-06-15 DIAGNOSIS — N20.9 URINARY CALCULUS, UNSPECIFIED: ICD-10-CM

## 2022-06-15 PROCEDURE — 99213 OFFICE O/P EST LOW 20 MIN: CPT

## 2022-06-15 NOTE — HISTORY OF PRESENT ILLNESS
[FreeTextEntry1] : 72M presents today for a checkup. Pt states he had a renal US done recently which did not show any evidence for urinary stones. He feels he is doing well and his lab work is normal.

## 2022-07-29 ENCOUNTER — NON-APPOINTMENT (OUTPATIENT)
Age: 72
End: 2022-07-29

## 2022-08-03 ENCOUNTER — APPOINTMENT (OUTPATIENT)
Dept: RHEUMATOLOGY | Facility: CLINIC | Age: 72
End: 2022-08-03

## 2022-08-03 VITALS
DIASTOLIC BLOOD PRESSURE: 80 MMHG | SYSTOLIC BLOOD PRESSURE: 128 MMHG | HEART RATE: 76 BPM | OXYGEN SATURATION: 99 % | RESPIRATION RATE: 16 BRPM | TEMPERATURE: 96 F

## 2022-08-03 VITALS
DIASTOLIC BLOOD PRESSURE: 68 MMHG | RESPIRATION RATE: 16 BRPM | HEART RATE: 72 BPM | SYSTOLIC BLOOD PRESSURE: 106 MMHG | OXYGEN SATURATION: 98 %

## 2022-08-03 PROCEDURE — 96413 CHEMO IV INFUSION 1 HR: CPT

## 2022-08-03 RX ORDER — ACETAMINOPHEN 325 MG/1
325 TABLET ORAL
Qty: 0 | Refills: 0 | Status: COMPLETED | OUTPATIENT
Start: 2022-07-26

## 2022-08-03 RX ORDER — CETIRIZINE HYDROCHLORIDE 10 MG/1
10 TABLET, COATED ORAL
Qty: 0 | Refills: 0 | Status: COMPLETED | OUTPATIENT
Start: 2022-07-26

## 2022-08-11 ENCOUNTER — OUTPATIENT (OUTPATIENT)
Dept: OUTPATIENT SERVICES | Facility: HOSPITAL | Age: 72
LOS: 1 days | Discharge: HOME | End: 2022-08-11

## 2022-08-11 DIAGNOSIS — Z86.718 PERSONAL HISTORY OF OTHER VENOUS THROMBOSIS AND EMBOLISM: ICD-10-CM

## 2022-08-16 ENCOUNTER — APPOINTMENT (OUTPATIENT)
Dept: HEMATOLOGY ONCOLOGY | Facility: CLINIC | Age: 72
End: 2022-08-16

## 2022-08-16 VITALS
DIASTOLIC BLOOD PRESSURE: 77 MMHG | SYSTOLIC BLOOD PRESSURE: 124 MMHG | TEMPERATURE: 98.6 F | OXYGEN SATURATION: 98 % | HEIGHT: 78 IN | RESPIRATION RATE: 18 BRPM | BODY MASS INDEX: 21.93 KG/M2 | WEIGHT: 189.56 LBS | HEART RATE: 80 BPM

## 2022-08-16 PROCEDURE — 99213 OFFICE O/P EST LOW 20 MIN: CPT

## 2022-08-16 NOTE — REVIEW OF SYSTEMS
[Patient Intake Form Reviewed] : Patient intake form was reviewed [Negative] : Allergic/Immunologic [FreeTextEntry2] : lost weigth in last few months

## 2022-08-16 NOTE — HISTORY OF PRESENT ILLNESS
[de-identified] : Patient was diagnosed with LLE DVT in . He remains on AC with Coumadin since then. His INR is stable on stable dose of Coumadin 6.5 mg daily and he had no bleeding complications. He was referred here to evaluate if he should stop anticoagulation. \par At the time of diagnosis of DVT he had flare up of his ulcerative colitis, he was on high dose prednisone, developed  new onset diabetes, also had several hours car trip. He had thrombophilia w/up done - results are not available now ( old records not available) . Extended/possibly indefinite anticoagulation was recommended. Never had recurrence of thrombosis.\par He has chronic LLE swelling- mild, he uses compression stocking during plane flight. His father  of PE at age 74 ( unclear if unprovoked). \par \par His ulcerative colitis/ Crohn disease.\par \par Stopped Coumadin in 2018.\par Evaluated here in 2018. \par Persistently decreased  Protein C - after coumadin was discontinued.\par Protein C antigen level 62 % might indicate mild deficiency or represent lower end of normal distribution.  \par He also has elevated D -dimer- which  might indicate higher risk of recurrent VTE after AC is discontinued.\par \par Due to above factors his risk of recurrent thrombosis is probably increased. I recommended  to continue prophylactic anticoagulation. Extended/ indefinite anticoagulation with lower dose Xarelto 10 mg daily ( [address] CHOICE trial ) .  \par \par \par  [de-identified] : On Xarelto 10 mg daily and tolerating well. Hx of UC- now on Entyvio, off  steroids. Feels OK.Colitis sx quiet. recent colonoscopy OK

## 2022-08-16 NOTE — PHYSICAL EXAM
[Normal] : well developed, well nourished, in no acute distress [de-identified] : mild LLE edema  chronic

## 2022-08-16 NOTE — ASSESSMENT
[FreeTextEntry1] : 71 y/o male with history of LLE DVT in 2003 in setting of flare up of UC/Crohn and  car travel. Family hx of PE in father ( details not available). \par \par Persistently low protein C and persistent elevated D dimer. We discussed pro's and con's of  anticoagulation and patient preferred indefinitie AC - uncomfortable with small risk of recurrent VTE.\par Currently on indefinite AC - low dose Xarelto per [facility] Choice trial.\par \par Tolerating Xarelto well\par Continue Xarelto for secondary prophylaxis as long as well tolerated.\par \par Recent ( several months weight loss) COntributes to being less hungry, also limited diet options due to diabetes,  do not  nutritional supplements. Needs to follow up with PCP and GI .\par \par RV in one - two years  PRN. \par \par

## 2022-09-20 RX ORDER — VEDOLIZUMAB 300 MG/5ML
300 INJECTION, POWDER, LYOPHILIZED, FOR SOLUTION INTRAVENOUS
Qty: 1 | Refills: 5 | Status: DISCONTINUED | OUTPATIENT
Start: 2021-08-19 | End: 2022-02-01

## 2022-09-20 RX ORDER — VEDOLIZUMAB 300 MG/5ML
300 INJECTION, POWDER, LYOPHILIZED, FOR SOLUTION INTRAVENOUS
Qty: 0 | Refills: 0 | Status: COMPLETED | OUTPATIENT
Start: 2022-07-26

## 2022-10-03 DIAGNOSIS — Z51.81 ENCOUNTER FOR THERAPEUTIC DRUG LVL MONITORING: ICD-10-CM

## 2022-10-03 DIAGNOSIS — Z79.899 OTHER LONG TERM (CURRENT) DRUG THERAPY: ICD-10-CM

## 2022-10-05 ENCOUNTER — NON-APPOINTMENT (OUTPATIENT)
Age: 72
End: 2022-10-05

## 2022-10-06 ENCOUNTER — NON-APPOINTMENT (OUTPATIENT)
Age: 72
End: 2022-10-06

## 2022-10-12 ENCOUNTER — NON-APPOINTMENT (OUTPATIENT)
Age: 72
End: 2022-10-12

## 2022-10-13 ENCOUNTER — NON-APPOINTMENT (OUTPATIENT)
Age: 72
End: 2022-10-13

## 2022-10-18 ENCOUNTER — APPOINTMENT (OUTPATIENT)
Dept: GASTROENTEROLOGY | Facility: CLINIC | Age: 72
End: 2022-10-18

## 2022-10-18 VITALS
SYSTOLIC BLOOD PRESSURE: 130 MMHG | HEIGHT: 78 IN | BODY MASS INDEX: 21.29 KG/M2 | WEIGHT: 184 LBS | HEART RATE: 78 BPM | DIASTOLIC BLOOD PRESSURE: 70 MMHG | TEMPERATURE: 98.7 F | OXYGEN SATURATION: 98 %

## 2022-10-18 LAB
BASOPHILS # BLD AUTO: 0.09 K/UL
BASOPHILS NFR BLD AUTO: 1.3 %
EOSINOPHIL # BLD AUTO: 0.29 K/UL
EOSINOPHIL NFR BLD AUTO: 4.2 %
HCT VFR BLD CALC: 46.2 %
HGB BLD-MCNC: 15.3 G/DL
IMM GRANULOCYTES NFR BLD AUTO: 0.1 %
LYMPHOCYTES # BLD AUTO: 1.18 K/UL
LYMPHOCYTES NFR BLD AUTO: 17.3 %
MAN DIFF?: NORMAL
MCHC RBC-ENTMCNC: 31.9 PG
MCHC RBC-ENTMCNC: 33.1 GM/DL
MCV RBC AUTO: 96.3 FL
MONOCYTES # BLD AUTO: 0.64 K/UL
MONOCYTES NFR BLD AUTO: 9.4 %
NEUTROPHILS # BLD AUTO: 4.63 K/UL
NEUTROPHILS NFR BLD AUTO: 67.7 %
PLATELET # BLD AUTO: 161 K/UL
RBC # BLD: 4.8 M/UL
RBC # FLD: 13.5 %
WBC # FLD AUTO: 6.84 K/UL

## 2022-10-18 PROCEDURE — 99214 OFFICE O/P EST MOD 30 MIN: CPT

## 2022-10-18 NOTE — PHYSICAL EXAM
[Normal] : oriented to person, place, and time [de-identified] : Healing wound left forearm minimal erythema

## 2022-10-18 NOTE — ASSESSMENT
[FreeTextEntry1] : Well-controlled colitis on Entyvio maintenance\par Healing arm wound noted, no contraindication to tomorrows Entyvio infusion as scheduled\par \par Plan\par Continue Entyvio as scheduled\par Consider tapering and discontinuing mesalamine sometime this winter\par Office visit again in 6 months, sooner as needed

## 2022-10-18 NOTE — HISTORY OF PRESENT ILLNESS
[FreeTextEntry1] : 72-year-old male\par Longstanding ulcerative colitis\par Prior long-term use of corticosteroid\par Distant past use of mercaptopurine\par Most recently has been on Entyvio maintenance infusion\par Has done extremely well, able to completely discontinue corticosteroid\par Normal bowel habit\par Last colonoscopy May 2022\par Entirely normal examination including biopsies, deep remission\par \par Patient recently returned from a trip, injured his arm, open wound, now healing\par Entyvio infusions postponed due to active treatment of infection, here for evaluation prior to tomorrow's infusion

## 2022-10-19 ENCOUNTER — APPOINTMENT (OUTPATIENT)
Dept: RHEUMATOLOGY | Facility: CLINIC | Age: 72
End: 2022-10-19

## 2022-10-19 VITALS
HEART RATE: 74 BPM | DIASTOLIC BLOOD PRESSURE: 83 MMHG | TEMPERATURE: 98 F | RESPIRATION RATE: 18 BRPM | SYSTOLIC BLOOD PRESSURE: 134 MMHG | OXYGEN SATURATION: 100 %

## 2022-10-19 VITALS
HEART RATE: 82 BPM | SYSTOLIC BLOOD PRESSURE: 135 MMHG | TEMPERATURE: 98 F | OXYGEN SATURATION: 99 % | RESPIRATION RATE: 18 BRPM | DIASTOLIC BLOOD PRESSURE: 82 MMHG

## 2022-10-19 LAB
ALBUMIN SERPL ELPH-MCNC: 4.5 G/DL
ALP BLD-CCNC: 76 U/L
ALT SERPL-CCNC: 17 U/L
ANION GAP SERPL CALC-SCNC: 12 MMOL/L
AST SERPL-CCNC: 20 U/L
BILIRUB SERPL-MCNC: 0.5 MG/DL
BUN SERPL-MCNC: 16 MG/DL
CALCIUM SERPL-MCNC: 9.6 MG/DL
CHLORIDE SERPL-SCNC: 104 MMOL/L
CO2 SERPL-SCNC: 24 MMOL/L
CREAT SERPL-MCNC: 0.89 MG/DL
EGFR: 90 ML/MIN/1.73M2
GLUCOSE SERPL-MCNC: 79 MG/DL
HBV CORE IGG+IGM SER QL: NONREACTIVE
HBV SURFACE AB SER QL: NONREACTIVE
HBV SURFACE AG SER QL: NONREACTIVE
POTASSIUM SERPL-SCNC: 4.6 MMOL/L
PROT SERPL-MCNC: 6.7 G/DL
SODIUM SERPL-SCNC: 140 MMOL/L

## 2022-10-19 PROCEDURE — 96413 CHEMO IV INFUSION 1 HR: CPT

## 2022-10-19 RX ORDER — CETIRIZINE HYDROCHLORIDE 10 MG/1
10 TABLET, COATED ORAL
Qty: 0 | Refills: 0 | Status: COMPLETED | OUTPATIENT
Start: 2022-09-20

## 2022-10-19 RX ORDER — VEDOLIZUMAB 300 MG/5ML
300 INJECTION, POWDER, LYOPHILIZED, FOR SOLUTION INTRAVENOUS
Qty: 0 | Refills: 0 | Status: COMPLETED | OUTPATIENT
Start: 2022-09-20

## 2022-10-19 RX ORDER — ACETAMINOPHEN 325 MG/1
325 TABLET ORAL
Qty: 0 | Refills: 0 | Status: COMPLETED | OUTPATIENT
Start: 2022-09-20

## 2022-10-20 LAB
M TB IFN-G BLD-IMP: NEGATIVE
QUANTIFERON TB PLUS MITOGEN MINUS NIL: >10 IU/ML
QUANTIFERON TB PLUS NIL: 0.03 IU/ML
QUANTIFERON TB PLUS TB1 MINUS NIL: -0.01 IU/ML
QUANTIFERON TB PLUS TB2 MINUS NIL: -0.01 IU/ML

## 2022-12-09 ENCOUNTER — NON-APPOINTMENT (OUTPATIENT)
Age: 72
End: 2022-12-09

## 2022-12-12 NOTE — REASON FOR VISIT
[Consultation] : a consultation visit [Spouse] : spouse [FreeTextEntry1] : Ulcerative colitis declines

## 2022-12-14 ENCOUNTER — APPOINTMENT (OUTPATIENT)
Dept: RHEUMATOLOGY | Facility: CLINIC | Age: 72
End: 2022-12-14

## 2022-12-14 VITALS
DIASTOLIC BLOOD PRESSURE: 74 MMHG | HEART RATE: 75 BPM | OXYGEN SATURATION: 99 % | RESPIRATION RATE: 18 BRPM | TEMPERATURE: 98 F | SYSTOLIC BLOOD PRESSURE: 127 MMHG

## 2022-12-14 VITALS
OXYGEN SATURATION: 97 % | TEMPERATURE: 98 F | DIASTOLIC BLOOD PRESSURE: 81 MMHG | RESPIRATION RATE: 18 BRPM | HEART RATE: 92 BPM | SYSTOLIC BLOOD PRESSURE: 144 MMHG

## 2022-12-14 VITALS
RESPIRATION RATE: 18 BRPM | OXYGEN SATURATION: 99 % | TEMPERATURE: 98 F | SYSTOLIC BLOOD PRESSURE: 127 MMHG | HEART RATE: 75 BPM | DIASTOLIC BLOOD PRESSURE: 74 MMHG

## 2022-12-14 PROCEDURE — 96413 CHEMO IV INFUSION 1 HR: CPT

## 2022-12-14 RX ORDER — VEDOLIZUMAB 300 MG/5ML
300 INJECTION, POWDER, LYOPHILIZED, FOR SOLUTION INTRAVENOUS
Qty: 0 | Refills: 0 | Status: COMPLETED | OUTPATIENT
Start: 2022-11-15

## 2022-12-14 RX ORDER — CETIRIZINE HYDROCHLORIDE 10 MG/1
10 TABLET, COATED ORAL
Qty: 0 | Refills: 0 | Status: COMPLETED | OUTPATIENT
Start: 2022-11-15

## 2022-12-14 RX ORDER — ACETAMINOPHEN 325 MG/1
325 TABLET ORAL
Qty: 0 | Refills: 0 | Status: COMPLETED | OUTPATIENT
Start: 2022-11-15

## 2022-12-14 NOTE — HISTORY OF PRESENT ILLNESS
[N/A] : N/A [Denies] : Denies [No] : No [Yes] : Yes [Declined] : Declined [TB] : Tuberculosis screening [Hep acute panel] : Hepatitis acute panel [Left upper extremity] : Left upper extremity [22g] : 22g [Start Time: ___] : Medication Start Time: [unfilled] [End Time: ___] : Medication End Time: [unfilled] [IV discontinued. Intact. No signs or symptoms of IV complications noted. Time: ___] : IV discontinued. Intact. No signs or symptoms of IV complications noted. Time: [unfilled] [Patient  instructed to seek medical attention with signs and symptoms of adverse effects] : Patient  instructed to seek medical attention with signs and symptoms of adverse effects [Patient left unit in no acute distress] : Patient left unit in no acute distress [Medications administered as ordered and tolerated well.] : Medications administered as ordered and tolerated well. [de-identified] : 9:10 AM [de-identified] : 2/7/23 at 9:00 am-next infusion

## 2023-01-10 ENCOUNTER — NON-APPOINTMENT (OUTPATIENT)
Age: 73
End: 2023-01-10

## 2023-02-07 ENCOUNTER — APPOINTMENT (OUTPATIENT)
Dept: RHEUMATOLOGY | Facility: CLINIC | Age: 73
End: 2023-02-07

## 2023-02-13 RX ORDER — CETIRIZINE HYDROCHLORIDE 10 MG/1
10 TABLET, FILM COATED ORAL
Refills: 0 | Status: COMPLETED | OUTPATIENT
Start: 2023-02-13 | End: 1900-01-01

## 2023-02-13 RX ORDER — ACETAMINOPHEN 325 MG/1
325 TABLET ORAL
Refills: 0 | Status: COMPLETED | OUTPATIENT
Start: 2023-02-13 | End: 1900-01-01

## 2023-02-14 ENCOUNTER — APPOINTMENT (OUTPATIENT)
Dept: RHEUMATOLOGY | Facility: CLINIC | Age: 73
End: 2023-02-14
Payer: MEDICARE

## 2023-02-14 VITALS
DIASTOLIC BLOOD PRESSURE: 84 MMHG | SYSTOLIC BLOOD PRESSURE: 138 MMHG | HEART RATE: 73 BPM | OXYGEN SATURATION: 100 % | RESPIRATION RATE: 18 BRPM

## 2023-02-14 VITALS
TEMPERATURE: 96.3 F | DIASTOLIC BLOOD PRESSURE: 75 MMHG | HEART RATE: 83 BPM | OXYGEN SATURATION: 100 % | SYSTOLIC BLOOD PRESSURE: 125 MMHG | RESPIRATION RATE: 18 BRPM

## 2023-02-14 PROCEDURE — 96365 THER/PROPH/DIAG IV INF INIT: CPT

## 2023-02-14 RX ORDER — CETIRIZINE HYDROCHLORIDE 10 MG/1
10 TABLET, FILM COATED ORAL
Qty: 0 | Refills: 0 | Status: COMPLETED
Start: 2023-02-13

## 2023-02-14 RX ORDER — VEDOLIZUMAB 300 MG/5ML
300 INJECTION, POWDER, LYOPHILIZED, FOR SOLUTION INTRAVENOUS
Qty: 0 | Refills: 0 | Status: COMPLETED
Start: 2023-02-13

## 2023-02-14 RX ORDER — ACETAMINOPHEN 325 MG/1
325 TABLET ORAL
Qty: 0 | Refills: 0 | Status: COMPLETED
Start: 2023-02-13

## 2023-02-14 NOTE — HISTORY OF PRESENT ILLNESS
[N/A] : N/A [Denies] : Denies [No] : No [Yes] : Yes [Declined] : Declined [TB] : Tuberculosis screening [Hep acute panel] : Hepatitis acute panel [Left upper extremity] : Left upper extremity [22g] : 22g [Start Time: ___] : Medication Start Time: [unfilled] [End Time: ___] : Medication End Time: [unfilled] [IV discontinued. Intact. No signs or symptoms of IV complications noted. Time: ___] : IV discontinued. Intact. No signs or symptoms of IV complications noted. Time: [unfilled] [Patient  instructed to seek medical attention with signs and symptoms of adverse effects] : Patient  instructed to seek medical attention with signs and symptoms of adverse effects [Patient left unit in no acute distress] : Patient left unit in no acute distress [Medications administered as ordered and tolerated well.] : Medications administered as ordered and tolerated well. [de-identified] : 08:45 am

## 2023-04-01 ENCOUNTER — NON-APPOINTMENT (OUTPATIENT)
Age: 73
End: 2023-04-01

## 2023-04-06 ENCOUNTER — APPOINTMENT (OUTPATIENT)
Dept: GASTROENTEROLOGY | Facility: CLINIC | Age: 73
End: 2023-04-06
Payer: MEDICARE

## 2023-04-06 VITALS
HEIGHT: 78 IN | WEIGHT: 185 LBS | BODY MASS INDEX: 21.4 KG/M2 | OXYGEN SATURATION: 98 % | TEMPERATURE: 97.3 F | SYSTOLIC BLOOD PRESSURE: 123 MMHG | HEART RATE: 82 BPM | DIASTOLIC BLOOD PRESSURE: 80 MMHG

## 2023-04-06 PROCEDURE — 99213 OFFICE O/P EST LOW 20 MIN: CPT

## 2023-04-06 NOTE — HISTORY OF PRESENT ILLNESS
[FreeTextEntry1] : 72-year-old male\par Longstanding ulcerative colitis\par Prolonged use of corticosteroid\par Distant past use of mercaptopurine\par Most recently started Entyvio\par Clinically in remission\par Able to taper off of corticosteroid\par Last colonoscopy May 2022, entirely normal examination, deep remission\par Here for routine 6 months check-in\par \par Has been tapering off his p.o. mesalamine

## 2023-04-06 NOTE — ASSESSMENT
[FreeTextEntry1] : 72-year-old male\par Well-controlled colitis\par \par Plan\par Continue Entyvio\par Discontinue mesalamine\par Office visit 6 months

## 2023-04-10 RX ORDER — ACETAMINOPHEN 325 MG/1
325 TABLET ORAL
Qty: 0 | Refills: 0 | Status: COMPLETED | OUTPATIENT
Start: 2023-04-10 | End: 1900-01-01

## 2023-04-11 ENCOUNTER — APPOINTMENT (OUTPATIENT)
Dept: RHEUMATOLOGY | Facility: CLINIC | Age: 73
End: 2023-04-11
Payer: MEDICARE

## 2023-04-11 PROCEDURE — 96365 THER/PROPH/DIAG IV INF INIT: CPT

## 2023-04-11 RX ORDER — CETIRIZINE HYDROCHLORIDE 10 MG/1
10 TABLET, FILM COATED ORAL
Qty: 0 | Refills: 0 | Status: COMPLETED
Start: 2023-02-13

## 2023-04-11 RX ORDER — ACETAMINOPHEN 325 MG/1
325 TABLET ORAL
Qty: 0 | Refills: 0 | Status: COMPLETED
Start: 2023-02-13

## 2023-04-11 RX ORDER — VEDOLIZUMAB 300 MG/5ML
300 INJECTION, POWDER, LYOPHILIZED, FOR SOLUTION INTRAVENOUS
Qty: 0 | Refills: 0 | Status: COMPLETED
Start: 2023-02-13

## 2023-04-11 NOTE — HISTORY OF PRESENT ILLNESS
[N/A] : N/A [Denies] : Denies [No] : No [Yes] : Yes [Declined] : Declined [TB] : Tuberculosis screening [Hep acute panel] : Hepatitis acute panel [Left upper extremity] : Left upper extremity [22g] : 22g [Start Time: ___] : Medication Start Time: [unfilled] [End Time: ___] : Medication End Time: [unfilled] [IV discontinued. Intact. No signs or symptoms of IV complications noted. Time: ___] : IV discontinued. Intact. No signs or symptoms of IV complications noted. Time: [unfilled] [Patient  instructed to seek medical attention with signs and symptoms of adverse effects] : Patient  instructed to seek medical attention with signs and symptoms of adverse effects [Patient left unit in no acute distress] : Patient left unit in no acute distress [Medications administered as ordered and tolerated well.] : Medications administered as ordered and tolerated well. [de-identified] : 6/6/23 at 8:30 am- next infusion [de-identified] : 8:40 AM

## 2023-04-26 ENCOUNTER — EMERGENCY (EMERGENCY)
Facility: HOSPITAL | Age: 73
LOS: 0 days | Discharge: HOME | End: 2023-04-26
Attending: STUDENT IN AN ORGANIZED HEALTH CARE EDUCATION/TRAINING PROGRAM
Payer: MEDICARE

## 2023-04-26 VITALS
SYSTOLIC BLOOD PRESSURE: 119 MMHG | RESPIRATION RATE: 18 BRPM | DIASTOLIC BLOOD PRESSURE: 79 MMHG | OXYGEN SATURATION: 99 % | HEART RATE: 81 BPM | TEMPERATURE: 98 F

## 2023-04-26 VITALS — WEIGHT: 184.97 LBS | HEIGHT: 78 IN

## 2023-04-26 DIAGNOSIS — Z86.718 PERSONAL HISTORY OF OTHER VENOUS THROMBOSIS AND EMBOLISM: ICD-10-CM

## 2023-04-26 DIAGNOSIS — K50.90 CROHN'S DISEASE, UNSPECIFIED, WITHOUT COMPLICATIONS: ICD-10-CM

## 2023-04-26 DIAGNOSIS — Z79.84 LONG TERM (CURRENT) USE OF ORAL HYPOGLYCEMIC DRUGS: ICD-10-CM

## 2023-04-26 DIAGNOSIS — Z87.891 PERSONAL HISTORY OF NICOTINE DEPENDENCE: ICD-10-CM

## 2023-04-26 DIAGNOSIS — L03.116 CELLULITIS OF LEFT LOWER LIMB: ICD-10-CM

## 2023-04-26 DIAGNOSIS — E11.9 TYPE 2 DIABETES MELLITUS WITHOUT COMPLICATIONS: ICD-10-CM

## 2023-04-26 DIAGNOSIS — M79.89 OTHER SPECIFIED SOFT TISSUE DISORDERS: ICD-10-CM

## 2023-04-26 DIAGNOSIS — Z79.01 LONG TERM (CURRENT) USE OF ANTICOAGULANTS: ICD-10-CM

## 2023-04-26 DIAGNOSIS — I10 ESSENTIAL (PRIMARY) HYPERTENSION: ICD-10-CM

## 2023-04-26 LAB
ALBUMIN SERPL ELPH-MCNC: 3.8 G/DL — SIGNIFICANT CHANGE UP (ref 3.3–5)
ALP SERPL-CCNC: 80 U/L — SIGNIFICANT CHANGE UP (ref 40–120)
ALT FLD-CCNC: 27 U/L — SIGNIFICANT CHANGE UP (ref 12–78)
ANION GAP SERPL CALC-SCNC: 2 MMOL/L — LOW (ref 5–17)
APTT BLD: 32.7 SEC — SIGNIFICANT CHANGE UP (ref 27.5–35.5)
AST SERPL-CCNC: 21 U/L — SIGNIFICANT CHANGE UP (ref 15–37)
BASOPHILS # BLD AUTO: 0 K/UL — SIGNIFICANT CHANGE UP (ref 0–0.2)
BASOPHILS NFR BLD AUTO: 0 % — SIGNIFICANT CHANGE UP (ref 0–2)
BILIRUB SERPL-MCNC: 0.5 MG/DL — SIGNIFICANT CHANGE UP (ref 0.2–1.2)
BUN SERPL-MCNC: 19 MG/DL — SIGNIFICANT CHANGE UP (ref 7–23)
CALCIUM SERPL-MCNC: 9 MG/DL — SIGNIFICANT CHANGE UP (ref 8.5–10.1)
CHLORIDE SERPL-SCNC: 108 MMOL/L — SIGNIFICANT CHANGE UP (ref 96–108)
CO2 SERPL-SCNC: 27 MMOL/L — SIGNIFICANT CHANGE UP (ref 22–31)
CREAT SERPL-MCNC: 1.03 MG/DL — SIGNIFICANT CHANGE UP (ref 0.5–1.3)
EGFR: 76 ML/MIN/1.73M2 — SIGNIFICANT CHANGE UP
EOSINOPHIL # BLD AUTO: 0.22 K/UL — SIGNIFICANT CHANGE UP (ref 0–0.5)
EOSINOPHIL NFR BLD AUTO: 4 % — SIGNIFICANT CHANGE UP (ref 0–6)
GLUCOSE SERPL-MCNC: 103 MG/DL — HIGH (ref 70–99)
HCT VFR BLD CALC: 43.1 % — SIGNIFICANT CHANGE UP (ref 39–50)
HGB BLD-MCNC: 14.5 G/DL — SIGNIFICANT CHANGE UP (ref 13–17)
INR BLD: 1.34 RATIO — HIGH (ref 0.88–1.16)
LYMPHOCYTES # BLD AUTO: 1.4 K/UL — SIGNIFICANT CHANGE UP (ref 1–3.3)
LYMPHOCYTES # BLD AUTO: 26 % — SIGNIFICANT CHANGE UP (ref 13–44)
MCHC RBC-ENTMCNC: 31.5 PG — SIGNIFICANT CHANGE UP (ref 27–34)
MCHC RBC-ENTMCNC: 33.6 GM/DL — SIGNIFICANT CHANGE UP (ref 32–36)
MCV RBC AUTO: 93.5 FL — SIGNIFICANT CHANGE UP (ref 80–100)
MONOCYTES # BLD AUTO: 0.48 K/UL — SIGNIFICANT CHANGE UP (ref 0–0.9)
MONOCYTES NFR BLD AUTO: 9 % — SIGNIFICANT CHANGE UP (ref 2–14)
NEUTROPHILS # BLD AUTO: 3.28 K/UL — SIGNIFICANT CHANGE UP (ref 1.8–7.4)
NEUTROPHILS NFR BLD AUTO: 61 % — SIGNIFICANT CHANGE UP (ref 43–77)
NRBC # BLD: SIGNIFICANT CHANGE UP /100 WBCS (ref 0–0)
PLATELET # BLD AUTO: 156 K/UL — SIGNIFICANT CHANGE UP (ref 150–400)
POTASSIUM SERPL-MCNC: 4.2 MMOL/L — SIGNIFICANT CHANGE UP (ref 3.5–5.3)
POTASSIUM SERPL-SCNC: 4.2 MMOL/L — SIGNIFICANT CHANGE UP (ref 3.5–5.3)
PROT SERPL-MCNC: 7.3 GM/DL — SIGNIFICANT CHANGE UP (ref 6–8.3)
PROTHROM AB SERPL-ACNC: 15.6 SEC — HIGH (ref 10.5–13.4)
RBC # BLD: 4.61 M/UL — SIGNIFICANT CHANGE UP (ref 4.2–5.8)
RBC # FLD: 13 % — SIGNIFICANT CHANGE UP (ref 10.3–14.5)
SODIUM SERPL-SCNC: 137 MMOL/L — SIGNIFICANT CHANGE UP (ref 135–145)
WBC # BLD: 5.38 K/UL — SIGNIFICANT CHANGE UP (ref 3.8–10.5)
WBC # FLD AUTO: 5.38 K/UL — SIGNIFICANT CHANGE UP (ref 3.8–10.5)

## 2023-04-26 PROCEDURE — 96374 THER/PROPH/DIAG INJ IV PUSH: CPT

## 2023-04-26 PROCEDURE — 93970 EXTREMITY STUDY: CPT | Mod: 26

## 2023-04-26 PROCEDURE — 85610 PROTHROMBIN TIME: CPT

## 2023-04-26 PROCEDURE — 85025 COMPLETE CBC W/AUTO DIFF WBC: CPT

## 2023-04-26 PROCEDURE — 85730 THROMBOPLASTIN TIME PARTIAL: CPT

## 2023-04-26 PROCEDURE — 99284 EMERGENCY DEPT VISIT MOD MDM: CPT | Mod: FS

## 2023-04-26 PROCEDURE — 99284 EMERGENCY DEPT VISIT MOD MDM: CPT | Mod: 25

## 2023-04-26 PROCEDURE — 80053 COMPREHEN METABOLIC PANEL: CPT

## 2023-04-26 PROCEDURE — 36415 COLL VENOUS BLD VENIPUNCTURE: CPT

## 2023-04-26 PROCEDURE — 93970 EXTREMITY STUDY: CPT

## 2023-04-26 RX ORDER — CEPHALEXIN 500 MG
1 CAPSULE ORAL
Qty: 28 | Refills: 0
Start: 2023-04-26 | End: 2023-05-02

## 2023-04-26 RX ORDER — CEFTRIAXONE 500 MG/1
1000 INJECTION, POWDER, FOR SOLUTION INTRAMUSCULAR; INTRAVENOUS ONCE
Refills: 0 | Status: COMPLETED | OUTPATIENT
Start: 2023-04-26 | End: 2023-04-26

## 2023-04-26 RX ORDER — CEFTRIAXONE 500 MG/1
1000 INJECTION, POWDER, FOR SOLUTION INTRAMUSCULAR; INTRAVENOUS ONCE
Refills: 0 | Status: DISCONTINUED | OUTPATIENT
Start: 2023-04-26 | End: 2023-04-26

## 2023-04-26 RX ADMIN — CEFTRIAXONE 1000 MILLIGRAM(S): 500 INJECTION, POWDER, FOR SOLUTION INTRAMUSCULAR; INTRAVENOUS at 11:54

## 2023-04-26 NOTE — ED STATDOCS - NS ED ATTENDING STATEMENT MOD
This was a shared visit with the MEGHANA. I reviewed and verified the documentation and independently performed the documented:

## 2023-04-26 NOTE — ED STATDOCS - NSFOLLOWUPINSTRUCTIONS_ED_ALL_ED_FT
Cellulitis    WHAT YOU NEED TO KNOW:    Cellulitis is a skin infection caused by bacteria. Cellulitis may go away on its own or you may need treatment. Your healthcare provider may draw a [place] around the outside edges of your cellulitis. If your cellulitis spreads, your healthcare provider will see it outside of the [place]. Cellulitis          DISCHARGE INSTRUCTIONS:    Call 911 if:     You have sudden trouble breathing or chest pain.        Return to the emergency department if:     Your wound gets larger and more painful.       You feel a crackling under your skin when you touch it.      You have purple dots or bumps on your skin, or you see bleeding under your skin.      You have new swelling and pain in your legs.      The red, warm, swollen area gets larger.      You see red streaks coming from the infected area.    Contact your healthcare provider if:     You have a fever.      Your fever or pain does not go away or gets worse.      The area does not get smaller after 2 days of antibiotics.      Your skin is flaking or peeling off.      You have questions or concerns about your condition or care.    Medicines:     Antibiotics help treat the bacterial infection.       NSAIDs, such as ibuprofen, help decrease swelling, pain, and fever. NSAIDs can cause stomach bleeding or kidney problems in certain people. If you take blood thinner medicine, always ask if NSAIDs are safe for you. Always read the medicine label and follow directions. Do not give these medicines to children under 6 months of age without direction from your child's healthcare provider.      Acetaminophen decreases pain and fever. It is available without a doctor's order. Ask how much to take and how often to take it. Follow directions. Read the labels of all other medicines you are using to see if they also contain acetaminophen, or ask your doctor or pharmacist. Acetaminophen can cause liver damage if not taken correctly. Do not use more than 4 grams (4,000 milligrams) total of acetaminophen in one day.       Take your medicine as directed. Contact your healthcare provider if you think your medicine is not helping or if you have side effects. Tell him or her if you are allergic to any medicine. Keep a list of the medicines, vitamins, and herbs you take. Include the amounts, and when and why you take them. Bring the list or the pill bottles to follow-up visits. Carry your medicine list with you in case of an emergency.    Self-care:     Elevate the area above the level of your heart as often as you can. This will help decrease swelling and pain. Prop the area on pillows or blankets to keep it elevated comfortably.       Clean the area daily until the wound scabs over. Gently wash the area with soap and water. Pat dry. Use dressings as directed.       Place cool or warm, wet cloths on the area as directed. Use clean cloths and clean water. Leave it on the area until the cloth is room temperature. Pat the area dry with a clean, dry cloth. The cloths may help decrease pain.     Prevent cellulitis:     Do not scratch bug bites or areas of injury. You increase your risk for cellulitis by scratching these areas.       Do not share personal items, such as towels, clothing, and razors.       Clean exercise equipment with germ-killing  before and after you use it.      Wash your hands often. Use soap and water. Wash your hands after you use the bathroom, change a child's diapers, or sneeze. Wash your hands before you prepare or eat food. Use lotion to prevent dry, cracked skin. Handwashing           Wear pressure stockings as directed. You may be told to wear the stockings if you have peripheral edema. The stockings improve blood flow and decrease swelling.      Treat athlete’s foot. This can help prevent the spread of a bacterial skin infection.    Follow up with your healthcare provider within 3 days, or as directed: Your healthcare provider will check if your cellulitis is getting better. You may need different medicine. Write down your questions so you remember to ask them during your visits. Cellulitis    WHAT YOU NEED TO KNOW:    Cellulitis is a skin infection caused by bacteria. Cellulitis may go away on its own or you may need treatment. Your healthcare provider may draw a [place] around the outside edges of your cellulitis. If your cellulitis spreads, your healthcare provider will see it outside of the [place]. Cellulitis      DISCHARGE INSTRUCTIONS:    Call 911 if:     You have sudden trouble breathing or chest pain.        Return to the emergency department if:     Your wound gets larger and more painful.       You feel a crackling under your skin when you touch it.      You have purple dots or bumps on your skin, or you see bleeding under your skin.      You have new swelling and pain in your legs.      The red, warm, swollen area gets larger.      You see red streaks coming from the infected area.    Contact your healthcare provider if:     You have a fever.      Your fever or pain does not go away or gets worse.      The area does not get smaller after 2 days of antibiotics.      Your skin is flaking or peeling off.      You have questions or concerns about your condition or care.    Medicines:     Antibiotics help treat the bacterial infection.       NSAIDs, such as ibuprofen, help decrease swelling, pain, and fever. NSAIDs can cause stomach bleeding or kidney problems in certain people. If you take blood thinner medicine, always ask if NSAIDs are safe for you. Always read the medicine label and follow directions. Do not give these medicines to children under 6 months of age without direction from your child's healthcare provider.      Acetaminophen decreases pain and fever. It is available without a doctor's order. Ask how much to take and how often to take it. Follow directions. Read the labels of all other medicines you are using to see if they also contain acetaminophen, or ask your doctor or pharmacist. Acetaminophen can cause liver damage if not taken correctly. Do not use more than 4 grams (4,000 milligrams) total of acetaminophen in one day.       Take your medicine as directed. Contact your healthcare provider if you think your medicine is not helping or if you have side effects. Tell him or her if you are allergic to any medicine. Keep a list of the medicines, vitamins, and herbs you take. Include the amounts, and when and why you take them. Bring the list or the pill bottles to follow-up visits. Carry your medicine list with you in case of an emergency.    Self-care:     Elevate the area above the level of your heart as often as you can. This will help decrease swelling and pain. Prop the area on pillows or blankets to keep it elevated comfortably.       Clean the area daily until the wound scabs over. Gently wash the area with soap and water. Pat dry. Use dressings as directed.       Place cool or warm, wet cloths on the area as directed. Use clean cloths and clean water. Leave it on the area until the cloth is room temperature. Pat the area dry with a clean, dry cloth. The cloths may help decrease pain.     Prevent cellulitis:     Do not scratch bug bites or areas of injury. You increase your risk for cellulitis by scratching these areas.       Do not share personal items, such as towels, clothing, and razors.       Clean exercise equipment with germ-killing  before and after you use it.      Wash your hands often. Use soap and water. Wash your hands after you use the bathroom, change a child's diapers, or sneeze. Wash your hands before you prepare or eat food. Use lotion to prevent dry, cracked skin. Handwashing           Wear pressure stockings as directed. You may be told to wear the stockings if you have peripheral edema. The stockings improve blood flow and decrease swelling.      Treat athlete’s foot. This can help prevent the spread of a bacterial skin infection.    Follow up with your healthcare provider within 3 days, or as directed: Your healthcare provider will check if your cellulitis is getting better. You may need different medicine. Write down your questions so you remember to ask them during your visits.

## 2023-04-26 NOTE — ED STATDOCS - ATTENDING APP SHARED VISIT CONTRIBUTION OF CARE
I personally saw the patient with the PA, and completed the key components of the history and physical exam. I then discussed the management plan with the PA.     -ANTONIO Umaña-MS, MD  Internal/Emergency/Critical Medicine

## 2023-04-26 NOTE — ED STATDOCS - PROGRESS NOTE DETAILS
Patient seen and evaluated, ED attending note and orders reviewed, will continue with patient follow up and care -Olivia Cruz PA-C labs WNL, b/l LE dopplers negative for DVT, pt feeling well, will dc home, add keflex, c/w doxy, with PMD f/u for check of area, strict rteurn precautions given including fevers, worsening redness, swelling, warmth, pain, pt and wife agreeable to dc and plan of care  Olivia Cruz PA-C

## 2023-04-26 NOTE — ED STATDOCS - CLINICAL SUMMARY MEDICAL DECISION MAKING FREE TEXT BOX
72 y/o male with PMHx of cellulitis, DM, HTN, DVT on Xarelto presents with likely improving LLE cellulitis, low suspicion for necrotizing fascitis, given hx of DVT in the past moderate suspicion for new DVT. On appropriate abx for possible tickborne illnesses.    Will get:   CBC, CMP, coags, b/l LE duplexes to r/o DVT  Will give one dose of ceftriaxone  If no DVT, will likely d/c home on continued doxycycline and add keflex for double of coverage and f/u with PCP pt is a 74 y/o male with PMHx of cellulitis, DM, HTN, DVT on Xarelto presents with likely improving LLE cellulitis, low suspicion for necrotizing fascitis, given hx of DVT in the past moderate suspicion for new DVT. On appropriate abx for possible tickborne illnesses.    Will get:   CBC, CMP, coags, b/l LE duplexes to r/o DVT  Will give one dose of ceftriaxone  If no DVT, will likely d/c home on continued doxycycline and add keflex for double of coverage and f/u with PCP

## 2023-04-26 NOTE — ED ADULT TRIAGE NOTE - CHIEF COMPLAINT QUOTE
Pt presented to the ER with c/o left leg swelling and redness that started two days ago. Pt went to his PCP yesterday and was started on an antibiotic. Pt woke up today with no improvement and reported that it might be starting on the right leg. Pt denies any fevers.

## 2023-04-26 NOTE — ED STATDOCS - NS ED ROS FT
Constitutional: Denies fevers & chills  HEENT: Denies Rhinorrhea & sore throat  Cardiac: Denies Chest pain & new LE edema  Pulmonary: Denies Shortness of breath & Cough  Abdomen: Denies Abdominal pain, N/V, blood in stools, diarrhea   : Denies dysuria & hematuria.  Skin: +rash/redness b/l legs, +swelling left leg  Neuro: Denies new visual changes, confusion, headaches, and one sided paralysis  Psych: Denies SI & HI & Depression Constitutional: Denies fevers & chills  HEENT: Denies Rhinorrhea & sore throat  Cardiac: Denies Chest pain & new LE edema  Pulmonary: Denies Shortness of breath & Cough  Abdomen: Denies Abdominal pain, N/V, blood in stools, diarrhea   : Denies dysuria & hematuria.  Skin: +rash/redness b/l legs, +swelling left leg  Neuro: Denies new visual changes, confusion, headaches, and one sided paralysis

## 2023-04-26 NOTE — ED STATDOCS - OBJECTIVE STATEMENT
74 y/o male with PMHx of Crohn's, DM, colitis, DVT on Xarelto, HTN, cellulitis presents to the ED c/o left leg swelling and erythema on his left leg that started yesterday, two days ago pt pulled something off his right shin that he thinks might have been a tick and then the next day symptoms started. Pt also noticed today that he is starting to have some redness in his right leg. Pt went to his PCP yesterday and was started on Doxycycline. Woke up today with no improvement. Per wife, pt has had bad bouts of cellulitis in the past and had to be admitted at [facility] for 5 days to receive IV abx. Denies fevers, denies SOB, denies N/V/D. NKDA. Pt is a former smoker, quit over 40 years ago, drinks socially, denies drug use. Pt's hematologist is Dr. Mary. pt is a 74 y/o male with PMHx of Crohn's, DM, colitis, DVT on Xarelto, HTN, cellulitis presents to the ED c/o left leg swelling and erythema on his left leg that started yesterday, two days ago pt pulled something off his right shin that he thinks might have been a tick and then the next day symptoms started. Pt also noticed today that he is starting to have some redness in his right leg. Pt went to his PCP yesterday and was started on Doxycycline. Woke up today with no improvement. Per wife, pt has had bad bouts of cellulitis in the past and had to be admitted at [facility] for 5 days to receive IV abx. Denies fevers, denies SOB, denies N/V/D. NKDA. Pt is a former smoker, quit over 40 years ago, drinks socially, denies drug use. Pt's hematologist is Dr. Mary.

## 2023-04-26 NOTE — ED STATDOCS - PATIENT PORTAL LINK FT
You can access the FollowMyHealth Patient Portal offered by [facility] by registering at the following website: [email or web address]. By joining [facility]’s FollowMyHealth portal, you will also be able to view your health information using other applications (apps) compatible with our system.

## 2023-04-26 NOTE — ED ADULT NURSE NOTE - OBJECTIVE STATEMENT
Pt presented to the ER with c/o left leg swelling and redness that started two days ago. Pt went to his PCP yesterday and was started on an antibiotic. Pt woke up today with no improvement and reported that it might be starting on the right leg. Pt denies any fevers. Redness noted to BL shin, predominantly to L shin.

## 2023-04-26 NOTE — ED STATDOCS - PHYSICAL EXAMINATION
PHYSICAL EXAM:  GENERAL: in NAD, Sitting comfortable in bed, in no respiratory distress  HEAD: Atraumatic, no carey's sign, no periorbital ecchymosis   EYES: PERRL, EOMs intact b/l w/out deficits  ENMT: Moist membranes, no anterior/posterior, or supraclavicular LAD  CHEST/LUNG: CTAB no wheezes/rhonchi/rales  HEART: RRR no murmur/gallops/rubs  ABDOMEN: +BS, soft, NT, ND  EXTREMITIES: No LE edema, +2 radial pulses b/l. +LLE with focal area of redness, mild warmth and some LE+1 pitting edema. +RLE with what appears to be petechiae vs. macular non-raised rash, minimal/trace RLE edema as well.   NERVOUS SYSTEM:  A&Ox4, No motor deficits or sensory deficits to b/l UEs  Heme/LYMPH: No ecchymosis or bruising or LAD

## 2023-04-26 NOTE — ED STATDOCS - NS_ ATTENDINGSCRIBEDETAILS _ED_A_ED_FT
The scribe's documentation has been prepared under my direction and personally reviewed by me in its entirety. I confirm that the note above accurately reflects all work, treatment, procedures, and medical decision making performed by me.  ANTONIO Umaña-MS, MD  Internal/Emergency/Critical Care Medicine

## 2023-06-06 ENCOUNTER — APPOINTMENT (OUTPATIENT)
Dept: RHEUMATOLOGY | Facility: CLINIC | Age: 73
End: 2023-06-06
Payer: MEDICARE

## 2023-06-06 VITALS
SYSTOLIC BLOOD PRESSURE: 118 MMHG | DIASTOLIC BLOOD PRESSURE: 73 MMHG | OXYGEN SATURATION: 98 % | TEMPERATURE: 98 F | RESPIRATION RATE: 17 BRPM | HEART RATE: 76 BPM

## 2023-06-06 VITALS
OXYGEN SATURATION: 98 % | HEART RATE: 63 BPM | TEMPERATURE: 98 F | DIASTOLIC BLOOD PRESSURE: 70 MMHG | RESPIRATION RATE: 17 BRPM | SYSTOLIC BLOOD PRESSURE: 104 MMHG

## 2023-06-06 PROCEDURE — 96365 THER/PROPH/DIAG IV INF INIT: CPT

## 2023-06-06 RX ORDER — VEDOLIZUMAB 300 MG/5ML
300 INJECTION, POWDER, LYOPHILIZED, FOR SOLUTION INTRAVENOUS
Qty: 0 | Refills: 0 | Status: COMPLETED
Start: 2023-02-13

## 2023-06-06 RX ORDER — CETIRIZINE HYDROCHLORIDE 10 MG/1
10 TABLET, FILM COATED ORAL
Qty: 0 | Refills: 0 | Status: COMPLETED
Start: 2023-02-13

## 2023-06-06 RX ORDER — ACETAMINOPHEN 325 MG/1
325 TABLET ORAL
Qty: 0 | Refills: 0 | Status: COMPLETED
Start: 2023-04-10

## 2023-06-06 NOTE — HISTORY OF PRESENT ILLNESS
[Denies] : Denies [No] : No [Yes] : Yes [Declined] : Declined [TB] : Tuberculosis screening [Hep acute panel] : Hepatitis acute panel [Left upper extremity] : Left upper extremity [22g] : 22g [Start Time: ___] : Medication Start Time: [unfilled] [End Time: ___] : Medication End Time: [unfilled] [IV discontinued. Intact. No signs or symptoms of IV complications noted. Time: ___] : IV discontinued. Intact. No signs or symptoms of IV complications noted. Time: [unfilled] [Patient  instructed to seek medical attention with signs and symptoms of adverse effects] : Patient  instructed to seek medical attention with signs and symptoms of adverse effects [Patient left unit in no acute distress] : Patient left unit in no acute distress [Medications administered as ordered and tolerated well.] : Medications administered as ordered and tolerated well. [de-identified] : 8:50 AM [de-identified] : 8/1/23 at 8:30 am- next infusion

## 2023-06-22 RX ORDER — SODIUM SULFATE, POTASSIUM SULFATE, MAGNESIUM SULFATE 17.5; 3.13; 1.6 G/ML; G/ML; G/ML
17.5-3.13-1.6 SOLUTION, CONCENTRATE ORAL
Qty: 1 | Refills: 0 | Status: DISCONTINUED | COMMUNITY
Start: 2022-02-01 | End: 2023-06-22

## 2023-06-22 RX ORDER — CETIRIZINE HYDROCHLORIDE 10 MG/1
10 TABLET, FILM COATED ORAL
Qty: 1 | Refills: 5 | Status: DISCONTINUED | OUTPATIENT
Start: 2021-08-19 | End: 2023-06-22

## 2023-06-27 NOTE — ED ADULT NURSE NOTE - ISOLATION TYPE:
decreased ability to use arms for pushing/pulling/decreased ability to use legs for bridging/pushing/impaired ability to control trunk for mobility
None

## 2023-07-02 ENCOUNTER — OUTPATIENT (OUTPATIENT)
Dept: OUTPATIENT SERVICES | Facility: HOSPITAL | Age: 73
LOS: 1 days | Discharge: HOME | End: 2023-07-02

## 2023-07-02 DIAGNOSIS — Z86.718 PERSONAL HISTORY OF OTHER VENOUS THROMBOSIS AND EMBOLISM: ICD-10-CM

## 2023-07-05 ENCOUNTER — RESULT REVIEW (OUTPATIENT)
Age: 73
End: 2023-07-05

## 2023-07-05 ENCOUNTER — APPOINTMENT (OUTPATIENT)
Dept: HEMATOLOGY ONCOLOGY | Facility: CLINIC | Age: 73
End: 2023-07-05
Payer: MEDICARE

## 2023-07-05 VITALS
SYSTOLIC BLOOD PRESSURE: 109 MMHG | RESPIRATION RATE: 17 BRPM | TEMPERATURE: 98.1 F | HEART RATE: 89 BPM | WEIGHT: 181 LBS | OXYGEN SATURATION: 98 % | BODY MASS INDEX: 20.92 KG/M2 | DIASTOLIC BLOOD PRESSURE: 63 MMHG

## 2023-07-05 DIAGNOSIS — Z86.718 PERSONAL HISTORY OF OTHER VENOUS THROMBOSIS AND EMBOLISM: ICD-10-CM

## 2023-07-05 LAB
BASOPHILS # BLD AUTO: 0.07 K/UL — SIGNIFICANT CHANGE UP (ref 0–0.2)
BASOPHILS NFR BLD AUTO: 0.9 % — SIGNIFICANT CHANGE UP (ref 0–2)
EOSINOPHIL # BLD AUTO: 0.39 K/UL — SIGNIFICANT CHANGE UP (ref 0–0.5)
EOSINOPHIL NFR BLD AUTO: 5 % — SIGNIFICANT CHANGE UP (ref 0–6)
HCT VFR BLD CALC: 41.5 % — SIGNIFICANT CHANGE UP (ref 39–50)
HGB BLD-MCNC: 13.9 G/DL — SIGNIFICANT CHANGE UP (ref 13–17)
IMM GRANULOCYTES NFR BLD AUTO: 0.3 % — SIGNIFICANT CHANGE UP (ref 0–0.9)
LYMPHOCYTES # BLD AUTO: 1.39 K/UL — SIGNIFICANT CHANGE UP (ref 1–3.3)
LYMPHOCYTES # BLD AUTO: 17.8 % — SIGNIFICANT CHANGE UP (ref 13–44)
MCHC RBC-ENTMCNC: 31.8 PG — SIGNIFICANT CHANGE UP (ref 27–34)
MCHC RBC-ENTMCNC: 33.5 GM/DL — SIGNIFICANT CHANGE UP (ref 32–36)
MCV RBC AUTO: 95 FL — SIGNIFICANT CHANGE UP (ref 80–100)
MONOCYTES # BLD AUTO: 0.82 K/UL — SIGNIFICANT CHANGE UP (ref 0–0.9)
MONOCYTES NFR BLD AUTO: 10.5 % — SIGNIFICANT CHANGE UP (ref 2–14)
NEUTROPHILS # BLD AUTO: 5.12 K/UL — SIGNIFICANT CHANGE UP (ref 1.8–7.4)
NEUTROPHILS NFR BLD AUTO: 65.5 % — SIGNIFICANT CHANGE UP (ref 43–77)
NRBC # BLD: 0 /100 WBCS — SIGNIFICANT CHANGE UP (ref 0–0)
PLATELET # BLD AUTO: 186 K/UL — SIGNIFICANT CHANGE UP (ref 150–400)
RBC # BLD: 4.37 M/UL — SIGNIFICANT CHANGE UP (ref 4.2–5.8)
RBC # FLD: 13.4 % — SIGNIFICANT CHANGE UP (ref 10.3–14.5)
WBC # BLD: 7.81 K/UL — SIGNIFICANT CHANGE UP (ref 3.8–10.5)
WBC # FLD AUTO: 7.81 K/UL — SIGNIFICANT CHANGE UP (ref 3.8–10.5)

## 2023-07-05 PROCEDURE — 99214 OFFICE O/P EST MOD 30 MIN: CPT

## 2023-07-05 NOTE — HISTORY OF PRESENT ILLNESS
[de-identified] : Patient was diagnosed with LLE DVT in . He remains on AC with Coumadin since then. His INR is stable on stable dose of Coumadin 6.5 mg daily and he had no bleeding complications. He was referred here to evaluate if he should stop anticoagulation. \par At the time of diagnosis of DVT he had flare up of his ulcerative colitis, he was on high dose prednisone, developed  new onset diabetes, also had several hours car trip. He had thrombophilia w/up done - results are not available now ( old records not available) . Extended/possibly indefinite anticoagulation was recommended. Never had recurrence of thrombosis.\par He has chronic LLE swelling- mild, he uses compression stocking during plane flight. His father  of PE at age 74 ( unclear if unprovoked). \par \par His ulcerative colitis/ Crohn disease.\par \par Stopped Coumadin in 2018.\par Evaluated here in 2018. \par Persistently decreased  Protein C - after coumadin was discontinued.\par Protein C antigen level 62 % might indicate mild deficiency or represent lower end of normal distribution.  \par He also has elevated D -dimer- which  might indicate higher risk of recurrent VTE after AC is discontinued.\par \par Due to above factors his risk of recurrent thrombosis is probably increased. I recommended  to continue prophylactic anticoagulation. Extended/ indefinite anticoagulation with lower dose Xarelto 10 mg daily ( [address] CHOICE trial ) .  \par \par \par  [de-identified] : On Xarelto 10 mg daily and tolerating well. Hx of UC- now on Entyvio, well controlled.\par Lost few lbs but feels well and has no GI c/o.\par Was in ER APril for rash on legs- resolved after topical steroids. CBC in April was 141 in PCP,  next 137 K in MAy 2023 and he and his wife are very concerned about plts dropping.

## 2023-07-05 NOTE — REVIEW OF SYSTEMS
[Patient Intake Form Reviewed] : Patient intake form was reviewed [Negative] : Allergic/Immunologic [FreeTextEntry2] : per HPI  [de-identified] : rash resolved  [de-identified] : mild bruises forearms- not new

## 2023-07-05 NOTE — PHYSICAL EXAM
[Normal] : well developed, well nourished, in no acute distress [de-identified] : mild LLE edema  chronic

## 2023-07-05 NOTE — ASSESSMENT
[FreeTextEntry1] : 74 y/o male with history of LLE DVT in 2003 in setting of flare up of UC/Crohn and  car travel. Family hx of PE in father ( details not available). \par \par Persistently low protein C and persistent elevated D dimer. We discussed pro's and con's of  anticoagulation and patient preferred indefinitie AC - uncomfortable with small risk of recurrent VTE.\par Currently on indefinite AC - low dose Xarelto per [facility] Choice trial.\par \par Tolerating Xarelto well\par Continue Xarelto for secondary prophylaxis as long as well tolerated.\par \par Borderline normal low platelet count on  two occasions. Today plts are normal. Normal fluctuation of plts - not clinically significant. re-assured patient.\par D/w patient and his wife and answered multiple questions\par \par RV in one - two years  PRN. \par \par

## 2023-07-10 ENCOUNTER — APPOINTMENT (OUTPATIENT)
Dept: UROLOGY | Facility: CLINIC | Age: 73
End: 2023-07-10
Payer: MEDICARE

## 2023-07-10 VITALS
SYSTOLIC BLOOD PRESSURE: 133 MMHG | RESPIRATION RATE: 16 BRPM | HEIGHT: 78 IN | HEART RATE: 79 BPM | DIASTOLIC BLOOD PRESSURE: 80 MMHG | BODY MASS INDEX: 21.4 KG/M2 | WEIGHT: 185 LBS | OXYGEN SATURATION: 98 %

## 2023-07-10 DIAGNOSIS — N20.9 URINARY CALCULUS, UNSPECIFIED: ICD-10-CM

## 2023-07-10 DIAGNOSIS — N40.0 BENIGN PROSTATIC HYPERPLASIA WITHOUT LOWER URINARY TRACT SYMPMS: ICD-10-CM

## 2023-07-10 PROCEDURE — 99213 OFFICE O/P EST LOW 20 MIN: CPT

## 2023-07-10 NOTE — HISTORY OF PRESENT ILLNESS
[FreeTextEntry1] : 73M presents today for a checkup. He has formed renal stones in the past and has a family history of prostate cancer.

## 2023-07-12 LAB
APPEARANCE: CLEAR
BACTERIA: NEGATIVE /HPF
BILIRUBIN URINE: NEGATIVE
BLOOD URINE: NEGATIVE
CAST: 1 /LPF
COLOR: YELLOW
EPITHELIAL CELLS: 0 /HPF
GLUCOSE QUALITATIVE U: NEGATIVE MG/DL
KETONES URINE: NEGATIVE MG/DL
LEUKOCYTE ESTERASE URINE: NEGATIVE
MICROSCOPIC-UA: NORMAL
NITRITE URINE: NEGATIVE
PH URINE: 6
PROTEIN URINE: NEGATIVE MG/DL
PSA SERPL-MCNC: 1.86 NG/ML
RED BLOOD CELLS URINE: 1 /HPF
SPECIFIC GRAVITY URINE: 1.02
URINE CYTOLOGY: NORMAL
UROBILINOGEN URINE: 0.2 MG/DL
WHITE BLOOD CELLS URINE: 0 /HPF

## 2023-08-01 ENCOUNTER — APPOINTMENT (OUTPATIENT)
Dept: RHEUMATOLOGY | Facility: CLINIC | Age: 73
End: 2023-08-01
Payer: MEDICARE

## 2023-08-01 VITALS
OXYGEN SATURATION: 100 % | TEMPERATURE: 97.8 F | RESPIRATION RATE: 17 BRPM | DIASTOLIC BLOOD PRESSURE: 74 MMHG | HEART RATE: 74 BPM | SYSTOLIC BLOOD PRESSURE: 116 MMHG

## 2023-08-01 VITALS
OXYGEN SATURATION: 97 % | RESPIRATION RATE: 17 BRPM | SYSTOLIC BLOOD PRESSURE: 126 MMHG | HEART RATE: 86 BPM | DIASTOLIC BLOOD PRESSURE: 76 MMHG | TEMPERATURE: 97.8 F

## 2023-08-01 PROCEDURE — 96365 THER/PROPH/DIAG IV INF INIT: CPT

## 2023-08-01 RX ORDER — ACETAMINOPHEN 325 MG/1
325 TABLET ORAL
Qty: 0 | Refills: 0 | Status: COMPLETED
Start: 2023-02-13

## 2023-08-01 RX ORDER — CETIRIZINE HYDROCHLORIDE 10 MG/1
10 TABLET, FILM COATED ORAL
Qty: 0 | Refills: 0 | Status: COMPLETED
Start: 2023-02-13

## 2023-08-01 RX ORDER — VEDOLIZUMAB 300 MG/5ML
300 INJECTION, POWDER, LYOPHILIZED, FOR SOLUTION INTRAVENOUS
Qty: 0 | Refills: 0 | Status: COMPLETED
Start: 2023-02-13

## 2023-08-01 NOTE — HISTORY OF PRESENT ILLNESS
[N/A] : N/A [Denies] : Denies [Yes] : Yes [TB] : Tuberculosis screening [Left upper extremity] : Left upper extremity [22g] : 22g [Start Time: ___] : Medication Start Time: [unfilled] [End Time: ___] : Medication End Time: [unfilled] [IV discontinued. Intact. No signs or symptoms of IV complications noted. Time: ___] : IV discontinued. Intact. No signs or symptoms of IV complications noted. Time: [unfilled] [Patient  instructed to seek medical attention with signs and symptoms of adverse effects] : Patient  instructed to seek medical attention with signs and symptoms of adverse effects [Patient left unit in no acute distress] : Patient left unit in no acute distress [Medications administered as ordered and tolerated well.] : Medications administered as ordered and tolerated well. [de-identified] : patient next infusion appointment  scheduled 9/26/2023. [de-identified] : 8:33

## 2023-09-13 NOTE — REVIEW OF SYSTEMS
CHIEF COMPLAINT:  Acne    HISTORY OF PRESENT ILLNESS:    This is a 22 year old male patient here for follow up of acne of the face, back, chest and shoulders. Last visit was 12 months months ago.      Patient states acne is  well controlled with treatment.  Patient is experiencing  occasional breakouts. Nothing makes it flare.  Today's acne is average day. Patient has been using medications as below:  Azelaic Acid 20% once daily  Differin 0.3% gel, once daily   Clindamycin 1% solution, once daily   Patient washes with Panoxyl and Biore Charcoal Cleanser.  He is requesting refills on all medications.    Tried and failed acne treatments:  Minocycline 100 mg bid   Minocycline 135mg daily  Differin 0.3% gel daily  Clindamycin twice daily,  Biore baking soda acne scrub 2-3 times weekly, Oxy deep pre cleansing pads twice daily    REVIEW OF SYSTEMS:  The patient denies fever.  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  Acne     ALLERGIES OR INTOLERANCE:   ALLERGIES:  No Known Allergies     MEDICATIONS:   Reviewed and updated in [employer] today    GENERAL PAST MEDICAL HISTORY: I have reviewed the patient's past medical history and updating these as appropriate.  See Histories section of the EMR (electronic medical record) for a display of this information.    PHYSICAL EXAM:  Significant findings as follows:    Papules of the face:  [] None  [x] Mild [] Moderate [] Severe  Comedones of the face: [] None  [x] Mild [] Moderate [] Severe  Scarring of the face: [x] None  [] Mild [] Moderate [] Severe    The patient is well-nourished, well-developed, and alert with appropriate mood and affect.  No other  significant findings were found on examination of face, neck, chest, back, arms, eyelids, lips and external ears.         ASSESSMENT/PLAN:  1.  Acne Vulgaris, chronic, stable, controlled to patient's satisfaction. Continue:  Azelaic Acid 20 %, once daily to acne prone areas of the face, neck, back, chest and shoulders  Differin 0.3% gel, once daily to acne prone areas of the face, neck, back, chest and shoulders  Clindamycin 1% solution, once daily to acne prone areas of the face, neck, back, chest and shoulders  Refills of all topicals given for one year.     2.  Follow up - 1 year for Acne       On 9/13/2023, I, Brigitte Kelsey MA scribed the services personally performed by Chayito Machado MD .      The documentation recorded by the scribe accurately and completely reflects the service(s) I personally performed and the decisions made by me.       [Patient Intake Form Reviewed] : Patient intake form was reviewed [Easy Bruising] : a tendency for easy bruising [Negative] : Endocrine [Diarrhea] : diarrhea [FreeTextEntry7] : Symptoms improving with recent medication change [de-identified] : thin skin from chronic steroids

## 2023-09-15 RX ORDER — VEDOLIZUMAB 300 MG/5ML
300 INJECTION, POWDER, LYOPHILIZED, FOR SOLUTION INTRAVENOUS
Refills: 0 | Status: COMPLETED | OUTPATIENT
Start: 2023-02-13 | End: 1900-01-01

## 2023-09-22 RX ORDER — MESALAMINE 1000 MG/1
1000 SUPPOSITORY RECTAL
Qty: 1 | Refills: 5 | Status: DISCONTINUED | COMMUNITY
Start: 2021-07-30 | End: 2023-09-22

## 2023-09-22 RX ORDER — GLIMEPIRIDE 1 MG/1
1 TABLET ORAL DAILY
Qty: 30 | Refills: 0 | Status: DISCONTINUED | COMMUNITY
Start: 2021-10-07 | End: 2023-09-22

## 2023-09-22 RX ORDER — MESALAMINE 1.2 G/1
1.2 TABLET, DELAYED RELEASE ORAL DAILY
Qty: 360 | Refills: 3 | Status: DISCONTINUED | COMMUNITY
End: 2023-09-22

## 2023-09-22 RX ORDER — ACETAMINOPHEN 325 MG/1
325 TABLET, FILM COATED ORAL
Qty: 2 | Refills: 5 | Status: DISCONTINUED | OUTPATIENT
Start: 2021-08-19 | End: 2023-09-22

## 2023-09-22 RX ORDER — MESALAMINE 4 G/60ML
4 ENEMA RECTAL
Qty: 28 | Refills: 3 | Status: DISCONTINUED | COMMUNITY
Start: 2021-04-27 | End: 2023-09-22

## 2023-09-26 ENCOUNTER — APPOINTMENT (OUTPATIENT)
Dept: RHEUMATOLOGY | Facility: CLINIC | Age: 73
End: 2023-09-26
Payer: MEDICARE

## 2023-09-26 VITALS
DIASTOLIC BLOOD PRESSURE: 74 MMHG | OXYGEN SATURATION: 100 % | SYSTOLIC BLOOD PRESSURE: 117 MMHG | RESPIRATION RATE: 17 BRPM | HEART RATE: 86 BPM | TEMPERATURE: 96.9 F

## 2023-09-26 VITALS
RESPIRATION RATE: 17 BRPM | HEART RATE: 73 BPM | SYSTOLIC BLOOD PRESSURE: 111 MMHG | DIASTOLIC BLOOD PRESSURE: 72 MMHG | OXYGEN SATURATION: 100 % | TEMPERATURE: 96.6 F

## 2023-09-26 PROCEDURE — 96365 THER/PROPH/DIAG IV INF INIT: CPT

## 2023-09-26 RX ORDER — CETIRIZINE HYDROCHLORIDE 10 MG/1
10 TABLET, FILM COATED ORAL
Qty: 0 | Refills: 0 | Status: COMPLETED
Start: 2023-02-13

## 2023-09-26 RX ORDER — VEDOLIZUMAB 300 MG/5ML
300 INJECTION, POWDER, LYOPHILIZED, FOR SOLUTION INTRAVENOUS
Qty: 0 | Refills: 0 | Status: COMPLETED
Start: 2023-02-13

## 2023-10-03 ENCOUNTER — APPOINTMENT (OUTPATIENT)
Dept: GASTROENTEROLOGY | Facility: CLINIC | Age: 73
End: 2023-10-03
Payer: MEDICARE

## 2023-10-03 VITALS
BODY MASS INDEX: 20.25 KG/M2 | HEIGHT: 78 IN | HEART RATE: 82 BPM | OXYGEN SATURATION: 98 % | SYSTOLIC BLOOD PRESSURE: 119 MMHG | WEIGHT: 175 LBS | DIASTOLIC BLOOD PRESSURE: 75 MMHG

## 2023-10-03 DIAGNOSIS — R63.4 ABNORMAL WEIGHT LOSS: ICD-10-CM

## 2023-10-03 PROCEDURE — 99214 OFFICE O/P EST MOD 30 MIN: CPT

## 2023-10-03 NOTE — ED ADULT NURSE NOTE - NSFALLRSKPASTHIST_ED_ALL_ED
Patient seen in person in Medication Management Service. Patient states compliant most of the time with regimen. No bleeding or thromboembolic side effects noted. No significant med or dietary changes. No significant recent illness or disease state changes. PT/INR done via POC meter per protocol. INR was subtherapeutic at 1.5.  (goal 2 - 3)  The patient had been hospitalized and the warfarin held for four cardiac stents  The patient has three new medications:  Lipitor, Spironolactone and Brillenta  The patient now takes furosemide 40 mg bid  He is getting up multiple times during the night to go to the bathroom  He has been taking the second dose of the day at 1900  He will now take the second dose at 1600    Warfarin regimen will be continued at current dose 5 mg Mon/Thurs and 7.5 mg all other days. Will retest in 1 week. Patient understands dosing directions and information discussed. Dosing schedule and follow up appointment given to patient. Progress note routed to referring physicians office. 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 COVID 19 screening completed. At this time patient denies symptoms, recent travel and exposure. Patient educated to screen for temperature and COVID-19 symptoms prior to coming to clinic for next appointment. They are instructed to call the clinic to reschedule if they have any symptoms. Standing order for PT/INR has been placed in preparation to transition to possible remote INR monitoring given efforts to reduce the spread of COVID-19.       For Pharmacy Admin Tracking Only    Intervention Detail: Adherence Monitorin  Total # of Interventions Recommended: 0  Total # of Interventions Accepted: 0  Time Spent (min): 20
no

## 2023-10-04 ENCOUNTER — APPOINTMENT (OUTPATIENT)
Dept: CT IMAGING | Facility: CLINIC | Age: 73
End: 2023-10-04
Payer: MEDICARE

## 2023-10-04 ENCOUNTER — OUTPATIENT (OUTPATIENT)
Dept: OUTPATIENT SERVICES | Facility: HOSPITAL | Age: 73
LOS: 1 days | End: 2023-10-04
Payer: MEDICARE

## 2023-10-04 DIAGNOSIS — R63.4 ABNORMAL WEIGHT LOSS: ICD-10-CM

## 2023-10-04 PROCEDURE — 74177 CT ABD & PELVIS W/CONTRAST: CPT | Mod: 26,MH

## 2023-10-04 PROCEDURE — 74177 CT ABD & PELVIS W/CONTRAST: CPT

## 2023-10-06 ENCOUNTER — NON-APPOINTMENT (OUTPATIENT)
Age: 73
End: 2023-10-06

## 2023-10-10 ENCOUNTER — TRANSCRIPTION ENCOUNTER (OUTPATIENT)
Age: 73
End: 2023-10-10

## 2023-10-10 ENCOUNTER — OUTPATIENT (OUTPATIENT)
Dept: OUTPATIENT SERVICES | Facility: HOSPITAL | Age: 73
LOS: 1 days | End: 2023-10-10
Payer: MEDICARE

## 2023-10-10 ENCOUNTER — APPOINTMENT (OUTPATIENT)
Dept: GASTROENTEROLOGY | Facility: HOSPITAL | Age: 73
End: 2023-10-10

## 2023-10-10 ENCOUNTER — RESULT REVIEW (OUTPATIENT)
Age: 73
End: 2023-10-10

## 2023-10-10 VITALS
WEIGHT: 175.05 LBS | HEIGHT: 78 IN | RESPIRATION RATE: 18 BRPM | HEART RATE: 88 BPM | TEMPERATURE: 97 F | SYSTOLIC BLOOD PRESSURE: 137 MMHG | DIASTOLIC BLOOD PRESSURE: 78 MMHG | OXYGEN SATURATION: 98 %

## 2023-10-10 VITALS
SYSTOLIC BLOOD PRESSURE: 120 MMHG | DIASTOLIC BLOOD PRESSURE: 61 MMHG | HEART RATE: 77 BPM | RESPIRATION RATE: 19 BRPM | OXYGEN SATURATION: 100 %

## 2023-10-10 DIAGNOSIS — R63.4 ABNORMAL WEIGHT LOSS: ICD-10-CM

## 2023-10-10 LAB — GLUCOSE BLDC GLUCOMTR-MCNC: 104 MG/DL — HIGH (ref 70–99)

## 2023-10-10 PROCEDURE — 43239 EGD BIOPSY SINGLE/MULTIPLE: CPT

## 2023-10-10 PROCEDURE — 88305 TISSUE EXAM BY PATHOLOGIST: CPT | Mod: 26

## 2023-10-10 PROCEDURE — 88305 TISSUE EXAM BY PATHOLOGIST: CPT

## 2023-10-10 PROCEDURE — 94640 AIRWAY INHALATION TREATMENT: CPT

## 2023-10-10 PROCEDURE — 82962 GLUCOSE BLOOD TEST: CPT

## 2023-10-10 RX ORDER — LIDOCAINE HCL 20 MG/ML
4 VIAL (ML) INJECTION ONCE
Refills: 0 | Status: COMPLETED | OUTPATIENT
Start: 2023-10-10 | End: 2023-10-10

## 2023-10-10 RX ORDER — BUDESONIDE, MICRONIZED 100 %
1 POWDER (GRAM) MISCELLANEOUS
Qty: 0 | Refills: 0 | DISCHARGE

## 2023-10-10 RX ADMIN — Medication 4 MILLILITER(S): at 11:02

## 2023-10-10 NOTE — PRE PROCEDURE NOTE - PRE PROCEDURE EVALUATION
Attending Physician:        Benny Harris MD                    Procedure:    Indication for Procedure: abnormal CT , weight loss  ________________________________________________________  PAST MEDICAL & SURGICAL HISTORY:  DM (diabetes mellitus)      HTN (hypertension)      DVT (deep venous thrombosis)      Crohn's disease      No significant past surgical history        ALLERGIES:  No Known Allergies    HOME MEDICATIONS:  amLODIPine 10 mg oral tablet: 1 tab(s) orally once a day  ascorbic acid 500 mg oral tablet: 2.5 tab(s) orally once a day  atorvastatin 20 mg oral tablet: 1 tab(s) orally once a day  budesonide 9 mg oral capsule, extended release: 1 cap(s) orally once a day (in the morning)  Caltrate 600 mg oral tablet: 1 tab(s) orally once a day  Cinnamon 500 mg oral capsule: 1 cap(s) orally once a day  glimepiride 1 mg oral tablet: 1 tab(s) orally once a day  Januvia 100 mg oral tablet: 1 tab(s) orally once a day  losartan 100 mg oral tablet: 1 tab(s) orally once a day  magnesium carbonate 250 mg oral capsule: 1 cap(s) orally once a day  mesalamine 1.2 g oral delayed release tablet: 4 tab(s) orally once a day  mesalamine 4 g/60 mL rectal enema: 60 milliliter(s) rectal once a day  metFORMIN 500 mg oral tablet, extended release: 1 tab(s) orally 2 times a day  Multiple Vitamins oral tablet: 1 tab(s) orally once a day  predniSONE 5 mg oral tablet: 2.5 tab(s) orally once a day  ***pt currently on taper x last 3 weeks- highest dose was 60mg***  Vitamin D3 2000 intl units (50 mcg) oral capsule: 1 cap(s) orally once a day  Xarelto 10 mg oral tablet: 1 tab(s) orally once a day    AICD/PPM: [ ] yes   [x ] no    PERTINENT LAB DATA:                      PHYSICAL EXAMINATION:    Height (cm): 198.1  Weight (kg): 79.4  BMI (kg/m2): 20.2  BSA (m2): 2.13T(C): 36.1  HR: 88  BP: 137/78  RR: 18  SpO2: 98%    Constitutional: NAD  HEENT: PERRLA, EOMI,    Neck:  No JVD  Respiratory: CTAB/L  Cardiovascular: S1 and S2  Gastrointestinal: BS+, soft, NT/ND  Extremities: No peripheral edema  Neurological: A/O x 3, no focal deficits  Psychiatric: Normal mood, normal affect  Skin: No rashes    ASA Class: I [ ]  II [x ]  III [ ]  IV [ ]    COMMENTS:    The patient is a suitable candidate for the planned procedure unless box checked [ ]  No, explain:

## 2023-10-10 NOTE — ASU DISCHARGE PLAN (ADULT/PEDIATRIC) - NS MD DC FALL RISK RISK
For information on Fall & Injury Prevention, visit: [email or web address] OR  [email or web address] OR  https://www.cdc.gov/steadi/patient.html

## 2023-10-10 NOTE — ASU PREOP CHECKLIST - TEMPERATURE IN CELSIUS (DEGREES C)
36.1 PROBLEM DIAGNOSES  Problem: Pre-op evaluation  Assessment and Plan: -left heart cardiac catheterization with Dr. Villalobos  -procedure discussed with patient, risks and benefits explained, questions answered  -previous card notes, labs, ECG all reviewed pre cardiac catheterization  -discuss plan of care with Dr. Villalobos

## 2023-10-10 NOTE — ASU PATIENT PROFILE, ADULT - FALL HARM RISK - UNIVERSAL INTERVENTIONS
Bed in lowest position, wheels locked, appropriate side rails in place/Call bell, personal items and telephone in reach/Instruct patient to call for assistance before getting out of bed or chair/Non-slip footwear when patient is out of bed/[place] to call system/Physically safe environment - no spills, clutter or unnecessary equipment/Purposeful Proactive Rounding/Room/bathroom lighting operational, light cord in reach

## 2023-10-13 LAB — SURGICAL PATHOLOGY STUDY: SIGNIFICANT CHANGE UP

## 2023-10-16 ENCOUNTER — NON-APPOINTMENT (OUTPATIENT)
Age: 73
End: 2023-10-16

## 2023-11-10 NOTE — PRE-ANESTHESIA EVALUATION ADULT - BP NONINVASIVE DIASTOLIC (MM HG)
\Obstetrics & Gynecology History & Physical Note    Date of Service  2023    Chief Complaint  Pt here for induction due to cholestasis. She denies any bleeding, contractions, decreased fetal movement or other issues. She reports being tired.     History of Presenting Illness  Jessie Carney is a 23 y.o.  at 38w0d 2023, by Ultrasound. Patient's last menstrual period was 2023 (exact date). She is being admitted for induction of labor    Prenatal care is at [facility] and is complicated by:    Patient Active Problem List    Diagnosis Date Noted    HGSIL (high grade squamous intraepithelial lesion) on Pap smear of cervix 2023    Rubella non-immune status, antepartum 2023    Encounter for supervision of high risk pregnancy in third trimester, antepartum 2023    Suicide attempt (HCC) 2023    Cannabis use disorder 02/10/2023    Alcohol withdrawal delirium, acute, hyperactive (HCC) 2023    Closed avulsion fracture of metatarsal bone of right foot 2023    History of anxiety and depression 2019       Obstetric History  OB History    Para Term  AB Living   5 2 2   2 2   SAB IAB Ectopic Molar Multiple Live Births   2       0 2      # Outcome Date GA Lbr Ralf/2nd Weight Sex Delivery Anes PTL Lv   5 Current            4 Term 19 37w1d / 00:03 3.625 kg (7 lb 15.9 oz) M Vag-Spont None N HYUN   3 Term 17 40w1d  3.685 kg (8 lb 2 oz) F Vag-Spont None N HYUN      Birth Comments: IOL. pt states no complications   2 2016     SAB         Birth Comments: Passed on its own   1 2015     SAB         Birth Comments: passed on its own       Gynecologic History  HGSIL with low grade colpo to be repeated PP    Review of Systems  ROS    Medical History  Past Medical History:   Diagnosis Date    Anemia     Anxiety     Asthma     Dx at age 1. not on meds    Cannabis use disorder 02/10/2023    Depression     Intractable nausea and vomiting 2023     "Migraine     Seizure (HCC)     Urinary tract infection        Surgical History  Past Surgical History:   Procedure Laterality Date    ANTONIO BY LAPAROSCOPY  8/3/2018    Procedure: ANTONIO BY LAPAROSCOPY;  Surgeon: Germán Watts M.D.;  Location: SURGERY [facility];  Service: General    CYSTECTOMY  07/30/2018    face cyst removal Right side          Family History  family history includes Cancer in her maternal grandmother; No Known Problems in her brother, father, maternal grandfather, mother, paternal grandfather, and paternal grandmother.     Social History   reports that she has quit smoking. Her smoking use included cigarettes. She has a 1.5 pack-year smoking history. She has never used smokeless tobacco. She reports that she does not currently use alcohol after a past usage of about 3.0 oz of alcohol per week. She reports that she does not currently use drugs after having used the following drugs: Marijuana and Inhaled.    Allergies  Allergies   Allergen Reactions    Lanolin Rash          Pcn [Penicillins] Rash     \"Family is allergic\"    Sulfa Drugs Rash             Medications  Prior to Admission Medications   Prescriptions Last Dose Informant Patient Reported? Taking?   Misc. Devices (BREAST PUMP) Misc   No No   Sig: For breastfeeding difficulty or milk storage   PRENATAL VIT-DOCUSATE-IRON-FA PO   Yes No   Sig: Take  by mouth.   citalopram (CELEXA) 10 MG tablet   No No   Sig: Take 1 Tablet by mouth every day.   lamoTRIgine (LAMICTAL) 100 MG Tab   No No   Sig: Take 1/2 tab PO daily x 2 weeks, then increase to 1 tab PO daily.   magnesium oxide (MAG-OX) 400 MG Tab tablet   Yes No   Sig: Take 200 mg by mouth every day.   Patient not taking: Reported on 11/9/2023      Facility-Administered Medications: None       Physical Exam  Vitals:    11/09/23 2108   BP: 111/68   Pulse: (!) 116   Resp: 18   Temp: 36.2 °C (97.1 °F)   TempSrc: Tympanic   SpO2: 97%   Weight: 105 kg (232 lb)   Height: 1.727 m (5' 8\") "       General:   alert, cooperative, no distress   Skin:   normal   HEENT:  extraocular movements intact   Lungs:   clear to auscultation bilaterally   Heart:   regular rate and rhythm   Breasts:   deferred   Abdomen:  Abdomen soft, non-tender; gravid.   Pelvis: Exam deferred.   FHT:  125 BPM Fetal heart variability: moderate   [place]: External US   Presentations: Vertex by RN   Cervix:     Dilation: 2;3    Effacement: 60    Station:  -3    Consistency:      Position:         Laboratory:  Prenatal Results       General (Most Recent Result)       Test Value Reference Range Date Time    ABO  A   07/07/23 1353    Rh  POS   07/07/23 1353    Antibody screen  NEG   07/07/23 1353    HbA1c        Chlamydia by PCR  Negative  Negative 07/03/23 1035    Gonorrhea by PCR  Negative  Negative 07/03/23 1035    RPR/Syphilus  Non-Reactive  Non-Reactive 08/30/23 1330    HSV 1/2 by PCR (non-serum)        HSV 1/2 (serum)        HSV 1        HSV 2        HPV (16)  Negative  Negative 07/03/23 1035    HBsAg  Non-Reactive  Non-Reactive 07/07/23 1353    HIV-1 HIV-2 Antibodies  Non-Reactive  Non Reactive 07/07/23 1353    Rubella  9.87 IU/mL  07/07/23 1353    Tb                  Pap Smear (Most Recent Result)       Test Value Reference Range Date Time    Pap smear        Pap smear w/HPV        Pap smear w/CTNG        Pap smar w/HPV CTNG        Pap smear (reflex HPV ACUS)        Pap smear (reflex HPV ASCUS w/CTNG)  (See Report)    07/03/23 1035    Pathology gyn specimen  Abnormal   (See Report)    07/03/23 1035              Urinalysis (Most Recent Result)       Test Value Reference Range Date Time    Urinalysis  Abnormal   (See Report)    09/04/17 1505    POC urinalysis  (See Report)    03/06/19 1408    Urine drug screen (w/ conf)        Urine culture ([id number])  Abnormal   (See Report)    05/06/19 1642    Urine Protein/Creatinine Ratio                  Urinalysis, Culture if indicated       Test Value Reference Range Date Time    Color  Yellow    02/19/19 1745    Appearance  Turbid   02/19/19 1745    Specific Gravity  1.030  <1.035 02/19/19 1745    PH  5.0  5.0 - 8.0 02/19/19 1745    Glucose  Negative mg/dL Negative 02/19/19 1745    Ketones  Trace mg/dL Negative 02/19/19 1745    Protein  Negative mg/dL Negative 02/19/19 1745    Bilirubin  Negative  Negative 02/19/19 1745    Nitrites  Negative  Negative 02/19/19 1745    Leukocytes Esterase  Trace  Negative 02/19/19 1745    Blood  Negative  Negative 02/19/19 1745    Comment  Microscopic   02/19/19 1745    Culture  Yes UA Culture  11/02/16 1427              Urine Drug Screen       Test Value Reference Range Date Time    Amphetamines  Negative  Negative 12/21/22 1720    Barbiturates  Negative  Negative 12/21/22 1720    Benzodiazepines  Positive  Negative 12/21/22 1720    Cocaine  Negative  Negative 12/21/22 1720    Methadone  Negative  Negative 12/21/22 1720    Opiates  Negative  Negative 12/21/22 1720    Oxycodone  Negative  Negative 12/21/22 1720    Phencylidine  Negative  Negative 12/21/22 1720    Propoxyphene  Negative  Negative 12/21/22 1720    Marijuana Metabolite  Positive  Negative 12/21/22 1720              1st Trimester       Test Value Reference Range Date Time    Hgb        Hct        Fasting Glucose Tolerance        GTT, 1 hour        GTT, 2 hours        GTT, 3 hours                  2nd Trimester       Test Value Reference Range Date Time    Hgb  10.9 g/dL 12.0 - 16.0 08/30/23 1330       11.5 g/dL 12.0 - 16.0 07/07/23 1353    Hct  33.4 % 37.0 - 47.0 08/30/23 1330       34.0 % 37.0 - 47.0 07/07/23 1353    AST  11 U/L 12 - 45 08/30/23 1330    ALT  7 U/L 2 - 50 08/30/23 1330    Uric Acid        Fasting Glucose Tolerance        GTT, 1 hour  102 mg/dL 70 - 139 08/30/23 1445    GTT, 2 hours        GTT, 3 hours                  3rd Trimester       Test Value Reference Range Date Time    Hgb        Hct        Platelet count        GBS (RODRIGUEZ BROTH)  Negative  Negative 10/26/23 1433    Fasting Glucose  Tolerance        GTT, 1 hour        GTT, 2 hous        GTT, 3hours                  Congenital Disease Screening       Test Value Reference Range Date Time    First Trimester Screen        Quad Screen        BH Electrophoresis        Cystic Fibrosis Carrier Study        SMA        AFP Maternal Serum         AFP Tetra  (See Report)    19 1095    NIPT                  Legend    ^: Historical                              Urinalysis:    No results found     Imaging:  No orders to display         Assessment:  23 y.o.  38w0d who presents with  pregnancy    Plan:  No new Assessment & Plan notes have been filed under this hospital service since the last note was generated.  Service: Obstetrics & Gynecology    1. Admit to L&D  2. Pitocin induction  3. GBS: negative  4. Pain Control:  tbd  5. Social work consult for mental health hx    VTE prophylaxis: n/a    ROYER Vincent     77

## 2023-11-21 ENCOUNTER — APPOINTMENT (OUTPATIENT)
Dept: RHEUMATOLOGY | Facility: CLINIC | Age: 73
End: 2023-11-21
Payer: MEDICARE

## 2023-11-21 VITALS
HEART RATE: 79 BPM | DIASTOLIC BLOOD PRESSURE: 80 MMHG | RESPIRATION RATE: 18 BRPM | SYSTOLIC BLOOD PRESSURE: 138 MMHG | OXYGEN SATURATION: 99 % | TEMPERATURE: 98 F

## 2023-11-21 PROCEDURE — 96365 THER/PROPH/DIAG IV INF INIT: CPT

## 2023-11-21 RX ORDER — CETIRIZINE HYDROCHLORIDE 10 MG/1
10 TABLET, FILM COATED ORAL
Qty: 0 | Refills: 0 | Status: COMPLETED | OUTPATIENT
Start: 2023-11-14

## 2023-11-21 RX ORDER — VEDOLIZUMAB 300 MG/5ML
300 INJECTION, POWDER, LYOPHILIZED, FOR SOLUTION INTRAVENOUS
Qty: 0 | Refills: 0 | Status: COMPLETED | OUTPATIENT
Start: 2023-11-14

## 2023-11-21 RX ORDER — ACETAMINOPHEN 325 MG/1
325 TABLET ORAL
Qty: 0 | Refills: 0 | Status: COMPLETED | OUTPATIENT
Start: 2023-11-14

## 2023-12-26 ENCOUNTER — INPATIENT (INPATIENT)
Facility: HOSPITAL | Age: 73
LOS: 1 days | Discharge: HOME | DRG: 602 | End: 2023-12-28
Attending: HOSPITALIST | Admitting: INTERNAL MEDICINE
Payer: MEDICARE

## 2023-12-26 VITALS — WEIGHT: 175.93 LBS

## 2023-12-26 DIAGNOSIS — L03.116 CELLULITIS OF LEFT LOWER LIMB: ICD-10-CM

## 2023-12-26 LAB
ALBUMIN SERPL ELPH-MCNC: 3.8 G/DL — SIGNIFICANT CHANGE UP (ref 3.3–5)
ALBUMIN SERPL ELPH-MCNC: 3.8 G/DL — SIGNIFICANT CHANGE UP (ref 3.3–5)
ALP SERPL-CCNC: 60 U/L — SIGNIFICANT CHANGE UP (ref 40–120)
ALP SERPL-CCNC: 60 U/L — SIGNIFICANT CHANGE UP (ref 40–120)
ALT FLD-CCNC: 25 U/L — SIGNIFICANT CHANGE UP (ref 12–78)
ALT FLD-CCNC: 25 U/L — SIGNIFICANT CHANGE UP (ref 12–78)
ANION GAP SERPL CALC-SCNC: 2 MMOL/L — LOW (ref 5–17)
ANION GAP SERPL CALC-SCNC: 2 MMOL/L — LOW (ref 5–17)
APPEARANCE UR: CLEAR — SIGNIFICANT CHANGE UP
APPEARANCE UR: CLEAR — SIGNIFICANT CHANGE UP
APTT BLD: 36 SEC — HIGH (ref 24.5–35.6)
APTT BLD: 36 SEC — HIGH (ref 24.5–35.6)
AST SERPL-CCNC: 21 U/L — SIGNIFICANT CHANGE UP (ref 15–37)
AST SERPL-CCNC: 21 U/L — SIGNIFICANT CHANGE UP (ref 15–37)
BACTERIA # UR AUTO: NEGATIVE /HPF — SIGNIFICANT CHANGE UP
BACTERIA # UR AUTO: NEGATIVE /HPF — SIGNIFICANT CHANGE UP
BASOPHILS # BLD AUTO: 0.07 K/UL — SIGNIFICANT CHANGE UP (ref 0–0.2)
BASOPHILS # BLD AUTO: 0.07 K/UL — SIGNIFICANT CHANGE UP (ref 0–0.2)
BASOPHILS NFR BLD AUTO: 0.8 % — SIGNIFICANT CHANGE UP (ref 0–2)
BASOPHILS NFR BLD AUTO: 0.8 % — SIGNIFICANT CHANGE UP (ref 0–2)
BILIRUB SERPL-MCNC: 0.4 MG/DL — SIGNIFICANT CHANGE UP (ref 0.2–1.2)
BILIRUB SERPL-MCNC: 0.4 MG/DL — SIGNIFICANT CHANGE UP (ref 0.2–1.2)
BILIRUB UR-MCNC: NEGATIVE — SIGNIFICANT CHANGE UP
BILIRUB UR-MCNC: NEGATIVE — SIGNIFICANT CHANGE UP
BIZARRE PLATELETS BLD QL SMEAR: PRESENT — SIGNIFICANT CHANGE UP
BIZARRE PLATELETS BLD QL SMEAR: PRESENT — SIGNIFICANT CHANGE UP
BUN SERPL-MCNC: 19 MG/DL — SIGNIFICANT CHANGE UP (ref 7–23)
BUN SERPL-MCNC: 19 MG/DL — SIGNIFICANT CHANGE UP (ref 7–23)
BURR CELLS BLD QL SMEAR: PRESENT — SIGNIFICANT CHANGE UP
BURR CELLS BLD QL SMEAR: PRESENT — SIGNIFICANT CHANGE UP
CALCIUM SERPL-MCNC: 9 MG/DL — SIGNIFICANT CHANGE UP (ref 8.5–10.1)
CALCIUM SERPL-MCNC: 9 MG/DL — SIGNIFICANT CHANGE UP (ref 8.5–10.1)
CAST: 0 /LPF — SIGNIFICANT CHANGE UP (ref 0–4)
CAST: 0 /LPF — SIGNIFICANT CHANGE UP (ref 0–4)
CHLORIDE SERPL-SCNC: 105 MMOL/L — SIGNIFICANT CHANGE UP (ref 96–108)
CHLORIDE SERPL-SCNC: 105 MMOL/L — SIGNIFICANT CHANGE UP (ref 96–108)
CO2 SERPL-SCNC: 29 MMOL/L — SIGNIFICANT CHANGE UP (ref 22–31)
CO2 SERPL-SCNC: 29 MMOL/L — SIGNIFICANT CHANGE UP (ref 22–31)
COLOR SPEC: YELLOW — SIGNIFICANT CHANGE UP
COLOR SPEC: YELLOW — SIGNIFICANT CHANGE UP
CREAT SERPL-MCNC: 1.06 MG/DL — SIGNIFICANT CHANGE UP (ref 0.5–1.3)
CREAT SERPL-MCNC: 1.06 MG/DL — SIGNIFICANT CHANGE UP (ref 0.5–1.3)
DACRYOCYTES BLD QL SMEAR: SLIGHT — SIGNIFICANT CHANGE UP
DACRYOCYTES BLD QL SMEAR: SLIGHT — SIGNIFICANT CHANGE UP
DIFF PNL FLD: ABNORMAL
DIFF PNL FLD: ABNORMAL
EGFR: 74 ML/MIN/1.73M2 — SIGNIFICANT CHANGE UP
EGFR: 74 ML/MIN/1.73M2 — SIGNIFICANT CHANGE UP
EOSINOPHIL # BLD AUTO: 0.38 K/UL — SIGNIFICANT CHANGE UP (ref 0–0.5)
EOSINOPHIL # BLD AUTO: 0.38 K/UL — SIGNIFICANT CHANGE UP (ref 0–0.5)
EOSINOPHIL NFR BLD AUTO: 4.5 % — SIGNIFICANT CHANGE UP (ref 0–6)
EOSINOPHIL NFR BLD AUTO: 4.5 % — SIGNIFICANT CHANGE UP (ref 0–6)
ERYTHROCYTE [SEDIMENTATION RATE] IN BLOOD: 8 MM/HR — SIGNIFICANT CHANGE UP (ref 0–20)
ERYTHROCYTE [SEDIMENTATION RATE] IN BLOOD: 8 MM/HR — SIGNIFICANT CHANGE UP (ref 0–20)
GLUCOSE SERPL-MCNC: 116 MG/DL — HIGH (ref 70–99)
GLUCOSE SERPL-MCNC: 116 MG/DL — HIGH (ref 70–99)
GLUCOSE UR QL: NEGATIVE MG/DL — SIGNIFICANT CHANGE UP
GLUCOSE UR QL: NEGATIVE MG/DL — SIGNIFICANT CHANGE UP
HCT VFR BLD CALC: 43.3 % — SIGNIFICANT CHANGE UP (ref 39–50)
HCT VFR BLD CALC: 43.3 % — SIGNIFICANT CHANGE UP (ref 39–50)
HGB BLD-MCNC: 14.5 G/DL — SIGNIFICANT CHANGE UP (ref 13–17)
HGB BLD-MCNC: 14.5 G/DL — SIGNIFICANT CHANGE UP (ref 13–17)
HYPOCHROMIA BLD QL: SLIGHT — SIGNIFICANT CHANGE UP
HYPOCHROMIA BLD QL: SLIGHT — SIGNIFICANT CHANGE UP
IMM GRANULOCYTES NFR BLD AUTO: 0.2 % — SIGNIFICANT CHANGE UP (ref 0–0.9)
IMM GRANULOCYTES NFR BLD AUTO: 0.2 % — SIGNIFICANT CHANGE UP (ref 0–0.9)
INR BLD: 1.41 RATIO — HIGH (ref 0.85–1.18)
INR BLD: 1.41 RATIO — HIGH (ref 0.85–1.18)
KETONES UR-MCNC: ABNORMAL MG/DL
KETONES UR-MCNC: ABNORMAL MG/DL
LACTATE SERPL-SCNC: 1.1 MMOL/L — SIGNIFICANT CHANGE UP (ref 0.7–2)
LACTATE SERPL-SCNC: 1.1 MMOL/L — SIGNIFICANT CHANGE UP (ref 0.7–2)
LEUKOCYTE ESTERASE UR-ACNC: NEGATIVE — SIGNIFICANT CHANGE UP
LEUKOCYTE ESTERASE UR-ACNC: NEGATIVE — SIGNIFICANT CHANGE UP
LYMPHOCYTES # BLD AUTO: 0.97 K/UL — LOW (ref 1–3.3)
LYMPHOCYTES # BLD AUTO: 0.97 K/UL — LOW (ref 1–3.3)
LYMPHOCYTES # BLD AUTO: 11.4 % — LOW (ref 13–44)
LYMPHOCYTES # BLD AUTO: 11.4 % — LOW (ref 13–44)
MANUAL SMEAR VERIFICATION: SIGNIFICANT CHANGE UP
MANUAL SMEAR VERIFICATION: SIGNIFICANT CHANGE UP
MCHC RBC-ENTMCNC: 32.4 PG — SIGNIFICANT CHANGE UP (ref 27–34)
MCHC RBC-ENTMCNC: 32.4 PG — SIGNIFICANT CHANGE UP (ref 27–34)
MCHC RBC-ENTMCNC: 33.5 GM/DL — SIGNIFICANT CHANGE UP (ref 32–36)
MCHC RBC-ENTMCNC: 33.5 GM/DL — SIGNIFICANT CHANGE UP (ref 32–36)
MCV RBC AUTO: 96.7 FL — SIGNIFICANT CHANGE UP (ref 80–100)
MCV RBC AUTO: 96.7 FL — SIGNIFICANT CHANGE UP (ref 80–100)
MONOCYTES # BLD AUTO: 1.01 K/UL — HIGH (ref 0–0.9)
MONOCYTES # BLD AUTO: 1.01 K/UL — HIGH (ref 0–0.9)
MONOCYTES NFR BLD AUTO: 11.9 % — SIGNIFICANT CHANGE UP (ref 2–14)
MONOCYTES NFR BLD AUTO: 11.9 % — SIGNIFICANT CHANGE UP (ref 2–14)
NEUTROPHILS # BLD AUTO: 6.05 K/UL — SIGNIFICANT CHANGE UP (ref 1.8–7.4)
NEUTROPHILS # BLD AUTO: 6.05 K/UL — SIGNIFICANT CHANGE UP (ref 1.8–7.4)
NEUTROPHILS NFR BLD AUTO: 71.2 % — SIGNIFICANT CHANGE UP (ref 43–77)
NEUTROPHILS NFR BLD AUTO: 71.2 % — SIGNIFICANT CHANGE UP (ref 43–77)
NITRITE UR-MCNC: NEGATIVE — SIGNIFICANT CHANGE UP
NITRITE UR-MCNC: NEGATIVE — SIGNIFICANT CHANGE UP
OVALOCYTES BLD QL SMEAR: SLIGHT — SIGNIFICANT CHANGE UP
OVALOCYTES BLD QL SMEAR: SLIGHT — SIGNIFICANT CHANGE UP
PH UR: 5.5 — SIGNIFICANT CHANGE UP (ref 5–8)
PH UR: 5.5 — SIGNIFICANT CHANGE UP (ref 5–8)
PLAT MORPH BLD: NORMAL — SIGNIFICANT CHANGE UP
PLAT MORPH BLD: NORMAL — SIGNIFICANT CHANGE UP
PLATELET # BLD AUTO: 129 K/UL — LOW (ref 150–400)
PLATELET # BLD AUTO: 129 K/UL — LOW (ref 150–400)
POTASSIUM SERPL-MCNC: 3.9 MMOL/L — SIGNIFICANT CHANGE UP (ref 3.5–5.3)
POTASSIUM SERPL-MCNC: 3.9 MMOL/L — SIGNIFICANT CHANGE UP (ref 3.5–5.3)
POTASSIUM SERPL-SCNC: 3.9 MMOL/L — SIGNIFICANT CHANGE UP (ref 3.5–5.3)
POTASSIUM SERPL-SCNC: 3.9 MMOL/L — SIGNIFICANT CHANGE UP (ref 3.5–5.3)
PROT SERPL-MCNC: 7.3 GM/DL — SIGNIFICANT CHANGE UP (ref 6–8.3)
PROT SERPL-MCNC: 7.3 GM/DL — SIGNIFICANT CHANGE UP (ref 6–8.3)
PROT UR-MCNC: NEGATIVE MG/DL — SIGNIFICANT CHANGE UP
PROT UR-MCNC: NEGATIVE MG/DL — SIGNIFICANT CHANGE UP
PROTHROM AB SERPL-ACNC: 15.8 SEC — HIGH (ref 9.5–13)
PROTHROM AB SERPL-ACNC: 15.8 SEC — HIGH (ref 9.5–13)
RBC # BLD: 4.48 M/UL — SIGNIFICANT CHANGE UP (ref 4.2–5.8)
RBC # BLD: 4.48 M/UL — SIGNIFICANT CHANGE UP (ref 4.2–5.8)
RBC # FLD: 13.2 % — SIGNIFICANT CHANGE UP (ref 10.3–14.5)
RBC # FLD: 13.2 % — SIGNIFICANT CHANGE UP (ref 10.3–14.5)
RBC BLD AUTO: ABNORMAL
RBC BLD AUTO: ABNORMAL
RBC CASTS # UR COMP ASSIST: 9 /HPF — HIGH (ref 0–4)
RBC CASTS # UR COMP ASSIST: 9 /HPF — HIGH (ref 0–4)
SODIUM SERPL-SCNC: 136 MMOL/L — SIGNIFICANT CHANGE UP (ref 135–145)
SODIUM SERPL-SCNC: 136 MMOL/L — SIGNIFICANT CHANGE UP (ref 135–145)
SP GR SPEC: 1.02 — SIGNIFICANT CHANGE UP (ref 1–1.03)
SP GR SPEC: 1.02 — SIGNIFICANT CHANGE UP (ref 1–1.03)
SQUAMOUS # UR AUTO: 0 /HPF — SIGNIFICANT CHANGE UP (ref 0–5)
SQUAMOUS # UR AUTO: 0 /HPF — SIGNIFICANT CHANGE UP (ref 0–5)
UROBILINOGEN FLD QL: 0.2 MG/DL — SIGNIFICANT CHANGE UP (ref 0.2–1)
UROBILINOGEN FLD QL: 0.2 MG/DL — SIGNIFICANT CHANGE UP (ref 0.2–1)
WBC # BLD: 8.5 K/UL — SIGNIFICANT CHANGE UP (ref 3.8–10.5)
WBC # BLD: 8.5 K/UL — SIGNIFICANT CHANGE UP (ref 3.8–10.5)
WBC # FLD AUTO: 8.5 K/UL — SIGNIFICANT CHANGE UP (ref 3.8–10.5)
WBC # FLD AUTO: 8.5 K/UL — SIGNIFICANT CHANGE UP (ref 3.8–10.5)
WBC UR QL: 0 /HPF — SIGNIFICANT CHANGE UP (ref 0–5)
WBC UR QL: 0 /HPF — SIGNIFICANT CHANGE UP (ref 0–5)

## 2023-12-26 PROCEDURE — 73600 X-RAY EXAM OF ANKLE: CPT | Mod: 26,LT

## 2023-12-26 PROCEDURE — 85027 COMPLETE CBC AUTOMATED: CPT

## 2023-12-26 PROCEDURE — 93005 ELECTROCARDIOGRAM TRACING: CPT

## 2023-12-26 PROCEDURE — 80048 BASIC METABOLIC PNL TOTAL CA: CPT

## 2023-12-26 PROCEDURE — 93010 ELECTROCARDIOGRAM REPORT: CPT

## 2023-12-26 PROCEDURE — 86140 C-REACTIVE PROTEIN: CPT

## 2023-12-26 PROCEDURE — 99223 1ST HOSP IP/OBS HIGH 75: CPT

## 2023-12-26 PROCEDURE — 83036 HEMOGLOBIN GLYCOSYLATED A1C: CPT

## 2023-12-26 PROCEDURE — 99285 EMERGENCY DEPT VISIT HI MDM: CPT

## 2023-12-26 PROCEDURE — 82962 GLUCOSE BLOOD TEST: CPT

## 2023-12-26 PROCEDURE — 36415 COLL VENOUS BLD VENIPUNCTURE: CPT

## 2023-12-26 RX ORDER — DEXTROSE 50 % IN WATER 50 %
25 SYRINGE (ML) INTRAVENOUS ONCE
Refills: 0 | Status: DISCONTINUED | OUTPATIENT
Start: 2023-12-26 | End: 2023-12-28

## 2023-12-26 RX ORDER — MESALAMINE 400 MG
60 TABLET, DELAYED RELEASE (ENTERIC COATED) ORAL
Qty: 0 | Refills: 0 | DISCHARGE

## 2023-12-26 RX ORDER — LANOLIN ALCOHOL/MO/W.PET/CERES
3 CREAM (GRAM) TOPICAL AT BEDTIME
Refills: 0 | Status: DISCONTINUED | OUTPATIENT
Start: 2023-12-26 | End: 2023-12-28

## 2023-12-26 RX ORDER — CALCIUM CARBONATE 500(1250)
1 TABLET ORAL
Qty: 0 | Refills: 0 | DISCHARGE

## 2023-12-26 RX ORDER — DEXTROSE 50 % IN WATER 50 %
15 SYRINGE (ML) INTRAVENOUS ONCE
Refills: 0 | Status: DISCONTINUED | OUTPATIENT
Start: 2023-12-26 | End: 2023-12-28

## 2023-12-26 RX ORDER — SODIUM CHLORIDE 9 MG/ML
1000 INJECTION INTRAMUSCULAR; INTRAVENOUS; SUBCUTANEOUS
Refills: 0 | Status: DISCONTINUED | OUTPATIENT
Start: 2023-12-26 | End: 2023-12-27

## 2023-12-26 RX ORDER — METFORMIN HYDROCHLORIDE 850 MG/1
2 TABLET ORAL
Refills: 0 | DISCHARGE

## 2023-12-26 RX ORDER — GLIMEPIRIDE 1 MG
1 TABLET ORAL
Qty: 0 | Refills: 0 | DISCHARGE

## 2023-12-26 RX ORDER — INSULIN LISPRO 100/ML
VIAL (ML) SUBCUTANEOUS
Refills: 0 | Status: DISCONTINUED | OUTPATIENT
Start: 2023-12-26 | End: 2023-12-28

## 2023-12-26 RX ORDER — GLUCAGON INJECTION, SOLUTION 0.5 MG/.1ML
1 INJECTION, SOLUTION SUBCUTANEOUS ONCE
Refills: 0 | Status: DISCONTINUED | OUTPATIENT
Start: 2023-12-26 | End: 2023-12-28

## 2023-12-26 RX ORDER — CHOLECALCIFEROL (VITAMIN D3) 125 MCG
1 CAPSULE ORAL
Qty: 0 | Refills: 0 | DISCHARGE

## 2023-12-26 RX ORDER — CEFTRIAXONE 500 MG/1
1000 INJECTION, POWDER, FOR SOLUTION INTRAMUSCULAR; INTRAVENOUS ONCE
Refills: 0 | Status: DISCONTINUED | OUTPATIENT
Start: 2023-12-26 | End: 2023-12-26

## 2023-12-26 RX ORDER — DEXTROSE 50 % IN WATER 50 %
12.5 SYRINGE (ML) INTRAVENOUS ONCE
Refills: 0 | Status: DISCONTINUED | OUTPATIENT
Start: 2023-12-26 | End: 2023-12-28

## 2023-12-26 RX ORDER — INSULIN LISPRO 100/ML
VIAL (ML) SUBCUTANEOUS AT BEDTIME
Refills: 0 | Status: DISCONTINUED | OUTPATIENT
Start: 2023-12-26 | End: 2023-12-28

## 2023-12-26 RX ORDER — VEDOLIZUMAB 108 MG/.68ML
300 INJECTION, SOLUTION SUBCUTANEOUS
Refills: 0 | DISCHARGE

## 2023-12-26 RX ORDER — SODIUM CHLORIDE 9 MG/ML
1000 INJECTION, SOLUTION INTRAVENOUS
Refills: 0 | Status: DISCONTINUED | OUTPATIENT
Start: 2023-12-26 | End: 2023-12-28

## 2023-12-26 RX ORDER — ONDANSETRON 8 MG/1
4 TABLET, FILM COATED ORAL EVERY 8 HOURS
Refills: 0 | Status: DISCONTINUED | OUTPATIENT
Start: 2023-12-26 | End: 2023-12-28

## 2023-12-26 RX ORDER — SODIUM CHLORIDE 9 MG/ML
1000 INJECTION INTRAMUSCULAR; INTRAVENOUS; SUBCUTANEOUS ONCE
Refills: 0 | Status: COMPLETED | OUTPATIENT
Start: 2023-12-26 | End: 2023-12-26

## 2023-12-26 RX ORDER — MAGNESIUM CARBONATE 54 MG/5 ML
1 LIQUID (ML) ORAL
Qty: 0 | Refills: 0 | DISCHARGE

## 2023-12-26 RX ORDER — LOSARTAN POTASSIUM 100 MG/1
1 TABLET, FILM COATED ORAL
Qty: 0 | Refills: 0 | DISCHARGE

## 2023-12-26 RX ORDER — RIVAROXABAN 15 MG-20MG
1 KIT ORAL
Refills: 0 | DISCHARGE

## 2023-12-26 RX ORDER — VANCOMYCIN HCL 1 G
1250 VIAL (EA) INTRAVENOUS ONCE
Refills: 0 | Status: COMPLETED | OUTPATIENT
Start: 2023-12-26 | End: 2023-12-26

## 2023-12-26 RX ORDER — ASCORBIC ACID 60 MG
2 TABLET,CHEWABLE ORAL
Refills: 0 | DISCHARGE

## 2023-12-26 RX ORDER — ACETAMINOPHEN 500 MG
650 TABLET ORAL ONCE
Refills: 0 | Status: DISCONTINUED | OUTPATIENT
Start: 2023-12-26 | End: 2023-12-28

## 2023-12-26 RX ORDER — MESALAMINE 400 MG
4 TABLET, DELAYED RELEASE (ENTERIC COATED) ORAL
Qty: 0 | Refills: 0 | DISCHARGE

## 2023-12-26 RX ORDER — AMLODIPINE BESYLATE 2.5 MG/1
1 TABLET ORAL
Qty: 0 | Refills: 0 | DISCHARGE

## 2023-12-26 RX ORDER — SITAGLIPTIN 50 MG/1
1 TABLET, FILM COATED ORAL
Qty: 0 | Refills: 0 | DISCHARGE

## 2023-12-26 RX ORDER — CEFTRIAXONE 500 MG/1
1000 INJECTION, POWDER, FOR SOLUTION INTRAMUSCULAR; INTRAVENOUS ONCE
Refills: 0 | Status: COMPLETED | OUTPATIENT
Start: 2023-12-26 | End: 2023-12-26

## 2023-12-26 RX ORDER — CINNAMON BARK 500 MG
1 CAPSULE ORAL
Qty: 0 | Refills: 0 | DISCHARGE

## 2023-12-26 RX ORDER — ATORVASTATIN CALCIUM 80 MG/1
1 TABLET, FILM COATED ORAL
Refills: 0 | DISCHARGE

## 2023-12-26 RX ORDER — ENOXAPARIN SODIUM 100 MG/ML
40 INJECTION SUBCUTANEOUS EVERY 24 HOURS
Refills: 0 | Status: DISCONTINUED | OUTPATIENT
Start: 2023-12-26 | End: 2023-12-26

## 2023-12-26 RX ADMIN — Medication 250 MILLIGRAM(S): at 21:33

## 2023-12-26 RX ADMIN — SODIUM CHLORIDE 1000 MILLILITER(S): 9 INJECTION INTRAMUSCULAR; INTRAVENOUS; SUBCUTANEOUS at 20:59

## 2023-12-26 RX ADMIN — CEFTRIAXONE 1000 MILLIGRAM(S): 500 INJECTION, POWDER, FOR SOLUTION INTRAMUSCULAR; INTRAVENOUS at 20:59

## 2023-12-26 NOTE — ED ADULT NURSE NOTE - NSFALLRISKINTERV_ED_ALL_ED
Assistance OOB with selected safe patient handling equipment if applicable/Assistance with ambulation/Communicate fall risk and risk factors to all staff, patient, and family/Provide visual cue: yellow wristband, yellow gown, etc/Reinforce activity limits and safety measures with patient and family/Call bell, personal items and telephone in reach/Instruct patient to call for assistance before getting out of bed/chair/stretcher/Non-slip footwear applied when patient is off stretcher/[place] to call system/Physically safe environment - no spills, clutter or unnecessary equipment/Purposeful Proactive Rounding/Room/bathroom lighting operational, light cord in reach Assistance OOB with selected safe patient handling equipment if applicable/Assistance with ambulation/Communicate fall risk and risk factors to all staff, patient, and family/Provide visual cue: yellow wristband, yellow gown, etc/Reinforce activity limits and safety measures with patient and family/Call bell, personal items and telephone in reach/Instruct patient to call for assistance before getting out of bed/chair/stretcher/Non-slip footwear applied when patient is off stretcher/[place] to call system/Physically safe environment - no spills, clutter or unnecessary equipment/Purposeful Proactive Rounding/Room/bathroom lighting operational, light cord in reach

## 2023-12-26 NOTE — PATIENT PROFILE ADULT - DO YOU FEEL THREATENED BY OTHERS?
Patient called asking if you can write a return to work note with restrictions  She stated the restrictions are 1 )she can't lift more than 10lbs 2  )she needs the early shift due to her nerve pain that increasing throughout the day  She stated that you are aware of the nerve pain and how it worsens through the day  She is asking for this letter to be placed in her MyChart  no

## 2023-12-26 NOTE — ED STATDOCS - PHYSICAL EXAMINATION
Vital signs reviewed  GENERAL: Patient nontoxic appearing, NAD  HEAD: NCAT  EYES: Anicteric  ENT: MMM  NECK: Supple, non tender  RESPIRATORY: Normal respiratory effort. CTA B/L. No wheezing, rales, rhonchi  CARDIOVASCULAR: Regular rate and rhythm  ABDOMEN: Soft. Nondistended. Nontender. No guarding or rebound. No CVA tenderness.  MUSCULOSKELETAL/EXTREMITIES: Brisk cap refill. 2+ radial pulses. No leg edema.  SKIN:  +Left lower extremity erythema, warmth, tenderness over dorsum of foot to lower leg.   passive ROM intact. no crepitus. sensation intact. 2+ dp pulse  NEURO: AAOx3. No gross FND.  PSYCHIATRIC: Cooperative. Affect appropriate.

## 2023-12-26 NOTE — PATIENT PROFILE ADULT - NSPRESCRALCAMT_GEN_A_NUR
"[id number] [id number]:, [facility] 1 or 2

## 2023-12-26 NOTE — ED STATDOCS - PRINCIPAL DIAGNOSIS
Follow-up with Dr. Oakley is a office tomorrow for re-evaluation.  Keep wound covered and keep knee immobilizer in place.  
Cellulitis of left foot

## 2023-12-26 NOTE — PATIENT PROFILE ADULT - NSPROHMSYMPCOND_GEN_A_NUR
MD Douglas Goldberg (cardio)  MD Rogers (endocrinologist)/cardiovascular/diabetes/gastrointestinal/hematologic

## 2023-12-26 NOTE — ED STATDOCS - OBJECTIVE STATEMENT
74 y/o male with PMHx of Crohn's, DM, colitis, DVT on Xarelto, HTN, cellulitis presents to the ED c/o left leg swelling and erythema on his left leg that started Earlier today.  sxs similar to Previous cellulitis event over the past few months   endorsing pain, redness, increased swelling of the left lower leg.  Unable to specify cause of event  no fever, numbness, falls, trauma, abd pain, cp, sob 72 y/o male with PMHx of Crohn's, DM, colitis, DVT on Xarelto, HTN, cellulitis presents to the ED c/o left leg swelling and erythema on his left leg that started Earlier today.  sxs similar to Previous cellulitis event over the past few months   endorsing pain, redness, increased swelling of the left lower leg.  Unable to specify cause of event  no fever, numbness, falls, trauma, abd pain, cp, sob

## 2023-12-26 NOTE — ED STATDOCS - CLINICAL SUMMARY MEDICAL DECISION MAKING FREE TEXT BOX
This patient presents with initial presentation of local erythema, warmth, swelling concerning for cellulitis.  Sensitivity/pain to light touch around the erythematous area.  no fluid pockets or fluctuance c/f abscess noted.  Low c/f osteomyelitis or DVT, ROM intact, no crepitus, not ill appearing.   No pain out of proportion, likely not  necrotizing fasciitis.  plan for labs, bcx, IV abx, likely admission

## 2023-12-26 NOTE — ED ADULT TRIAGE NOTE - CHIEF COMPLAINT QUOTE
pt ambulatory to triage c/o cellulitis to L foot x1 day. L foot is red and swollen. no abrasion present to foot. pmh blood clots, on xarelto, DM, HTN, HLD. -allergies

## 2023-12-26 NOTE — H&P ADULT - ASSESSMENT
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  He says that it does not hurt too much but it is very tender and very warm on touch. He feels cold and chills, bur denies fever.  He denies stepping on any foreign object or any injury to his leg/ankle or twisting ankle.  He further added that he has similar symptoms in past when he had cellulitis to the same area. Otherwise, he denies laft leg claudication, chest pain, sob, palpitation, N/V, abdominal pain, diarrhea or dysuria.  Denies smoking, alcohol or substance abuse.      # Acute cellulitis of left lower leg, left ankle and dorsum of left foot.  -Denies claudication. I doubt PAD or DVT.  -Admit to medical floor.  -Maintenance IV fluid.  -Follow blood and urine cultures, and X-ray left ankle.  -Ancef 1 gm IV q8h.  -Tylenol prn for pain or fever.    # HTN and Dyslipidemia.  -Stable and controlled.  -Continue his Amlodipine, Losartan and Atorvastatin.    # DM-type 2, seems to be controlled.  -. Last A1c level was 7.7 on 06/24/2021.  -Will get A1c level.  -ISS with coverage.  -Continue his januvia and Metformin.  -Accu-chek q AC & HS.    # H/o Crohn's disease.  -On Entyvio infusion [id number].    # Thrombocytopenia, likely reactive.  -Platelet 129k (His platelet varies between 111k-186k since 2021).  -Continue to monitor.    # H/o LLE DVT.  -On Xarelto.    CODE STATUS: FULL CODE.    Plan of care d/w the patient in detail at bedside, and he verbalized understanding.       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  He says that it does not hurt too much but it is very tender and very warm on touch. He feels cold and chills, bur denies fever.  He denies stepping on any foreign object or any injury to his leg/ankle or twisting ankle.  He further added that he has similar symptoms in past when he had cellulitis to the same area. Otherwise, he denies laft leg claudication, chest pain, sob, palpitation, N/V, abdominal pain, diarrhea or dysuria.  Denies smoking, alcohol or substance abuse.      # Acute cellulitis of left lower leg, left ankle and dorsum of left foot.  -Denies claudication. I doubt PAD or DVT.  -Admit to medical floor.  -Maintenance IV fluid.  -Follow blood and urine cultures, and X-ray left ankle.  -Ancef 1 gm IV q8h.  -Tylenol prn for pain or fever.    # HTN and Dyslipidemia.  -Stable and controlled.  -Continue his Amlodipine, Losartan and Atorvastatin.    # DM-type 2, seems to be controlled.  -. Last A1c level was 7.7 on 06/24/2021.  -Will get A1c level.  -ISS with coverage.  -Continue his januvia and Metformin.  -Accu-chek q AC & HS.    # H/o Crohn's disease.  -On Entyvio infusion [id number].    # Thrombocytopenia, likely reactive.  -Platelet 129k (His platelet varies between 111k-186k since 2021).  -Continue to monitor.    # H/o LLE DVT.  -On Xarelto.    CODE STATUS: FULL CODE.    Plan of care d/w the patient in detail at bedside, and he verbalized understanding.

## 2023-12-26 NOTE — H&P ADULT - NSHPPHYSICALEXAM_GEN_ALL_CORE
PHYSICAL EXAM:  GENERAL: NAD, lying in bed comfortably  HEAD:  Atraumatic, Normocephalic  EYES: EOMI, PERRLA, conjunctiva and sclera clear  ENT: Moist mucous membranes  NECK: Supple, No JVD  CHEST/LUNG: Clear to auscultation bilaterally; No rales, rhonchi, wheezing, or rubs. Unlabored respirations  HEART: Regular rate and rhythm; No murmurs, rubs, or gallops  ABDOMEN: Bowel sounds present; Soft, Nontender, Nondistended. No hepatomegaly  EXTREMITIES:  2+ Peripheral Pulses, brisk capillary refill. No clubbing, cyanosis, or edema  NERVOUS SYSTEM:  Alert & Oriented X3, speech clear. No deficits   MSK: FROM all 4 extremities, full and equal strength  SKIN: No rashes or lesions, Left lower leg and dorsum of left foot mildly swollen, tender, erythematous and warm in touch.

## 2023-12-26 NOTE — H&P ADULT - HISTORY OF PRESENT ILLNESS
Chief Complaint: lower leg pain/injury.    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  He says that it does not hurt too much but it is very tender and very warm on touch. He feels cold and chills, bur denies fever.  He denies stepping on any foreign object or any injury to his leg/ankle or twisting ankle.  He further added that he has similar symptoms in past when he had cellulitis to the same area. Otherwise, he denies laft leg claudication, chest pain, sob, palpitation, N/V, abdominal pain, diarrhea or dysuria.  Denies smoking, alcohol or substance abuse.  Vitals stable.  Sig labs: WBC 8.50k, Lactate 1.1, ESR 8, Hb 14.5, Platelet 129k (varies between 111k-186k since June 2021), BUN 19, Cr 1.06, , INR 1.41, LFTs wnl.  UA negative.    X-Ray left ankle: no fracture/dislocation of foreign body per my read; official report pending.    NS 1 L bolus and Rocephin 1 gm IV given in ED.

## 2023-12-26 NOTE — ED ADULT NURSE NOTE - OBJECTIVE STATEMENT
72yo male co left leg swelling and erythema on his left leg that started earlier today.  sxs similar to Previous cellulitis event over the past few months endorsing pain, redness, increased swelling of the left lower leg.  Unable to specify cause of event

## 2023-12-26 NOTE — H&P ADULT - NSHPLABSRESULTS_GEN_ALL_CORE
LABS:                        14.5   8.50  )-----------( 129      ( 26 Dec 2023 19:40 )             43.3     12-26    136  |  105  |  19  ----------------------------<  116<H>  3.9   |  29  |  1.06    Ca    9.0      26 Dec 2023 19:40    TPro  7.3  /  Alb  3.8  /  TBili  0.4  /  DBili  x   /  AST  21  /  ALT  25  /  AlkPhos  60  12-26    PT/INR - ( 26 Dec 2023 19:40 )   PT: 15.8 sec;   INR: 1.41 ratio         PTT - ( 26 Dec 2023 19:40 )  PTT:36.0 sec

## 2023-12-26 NOTE — H&P ADULT - NSICDXPASTMEDICALHX_GEN_ALL_CORE_FT
PAST MEDICAL HISTORY:  Chronic anticoagulation     Crohn's disease     DVT (deep venous thrombosis)     History of deep venous thrombosis (DVT) of distal vein of left lower extremity     HTN (hypertension)     Thrombocytopenia     Type 2 diabetes mellitus

## 2023-12-26 NOTE — PATIENT PROFILE ADULT - FALL HARM RISK - RISK INTERVENTIONS
Assistance OOB with selected safe patient handling equipment/Communicate Fall Risk and Risk Factors to all staff, patient, and family/Discuss with provider need for PT consult/Monitor gait and stability/Provide patient with walking aids - walker, cane, crutches/Reinforce activity limits and safety measures with patient and family/Visual Cue: Yellow wristband/Bed in lowest position, wheels locked, appropriate side rails in place/Call bell, personal items and telephone in reach/Instruct patient to call for assistance before getting out of bed or chair/Non-slip footwear when patient is out of bed/[place] to call system/Physically safe environment - no spills, clutter or unnecessary equipment/Purposeful Proactive Rounding/Room/bathroom lighting operational, light cord in reach Assistance OOB with selected safe patient handling equipment/Communicate Fall Risk and Risk Factors to all staff, patient, and family/Discuss with provider need for PT consult/Monitor gait and stability/Provide patient with walking aids - walker, cane, crutches/Reinforce activity limits and safety measures with patient and family/Visual Cue: Yellow wristband/Bed in lowest position, wheels locked, appropriate side rails in place/Call bell, personal items and telephone in reach/Instruct patient to call for assistance before getting out of bed or chair/Non-slip footwear when patient is out of bed/[place] to call system/Physically safe environment - no spills, clutter or unnecessary equipment/Purposeful Proactive Rounding/Room/bathroom lighting operational, light cord in reach

## 2023-12-26 NOTE — PHARMACOTHERAPY INTERVENTION NOTE - COMMENTS
Medication reconciliation completed.  Reviewed Medication list and confirmed med allergies with patient; confirmed with Dr. Marcano MedHx.  Pt confirms that he took all doses of daily meds PTA.

## 2023-12-26 NOTE — ED STATDOCS - PROGRESS NOTE DETAILS
74 y/o M with PMH of Crohns dx, DM, DVTs on xarelto, HTN, cellulitis presents with left foot/ankle pain swelling which started today. States redness started to extend up left leg which prompted ED visit. Denies fever, chills. States he's had recurring episodes of cellulitis which have required hospital admission. Denies trauma, open wound. PE: Well appearing. Cardiac: s1s2, RRR. Lungs: CTAB. Abdomen: NBS x4, soft, nontender. Skin: +erythema and edema over left foot extending proximal to left ankle. Sensation intact to light touch in all extremities. 2+ DP/PT pulses. A/P: Cellulitis, plan for labs, imaging, antibiotics, admission. - Venancio Rendon PA-C PLTs pending, being counted manually by lab. Labs otherwise with no actionable findings. Will plan for admission. Dr. Contreras as accepting. - Venancio Rendon PA-C

## 2023-12-27 LAB
A1C WITH ESTIMATED AVERAGE GLUCOSE RESULT: 5.6 % — SIGNIFICANT CHANGE UP (ref 4–5.6)
A1C WITH ESTIMATED AVERAGE GLUCOSE RESULT: 5.6 % — SIGNIFICANT CHANGE UP (ref 4–5.6)
ANION GAP SERPL CALC-SCNC: 4 MMOL/L — LOW (ref 5–17)
ANION GAP SERPL CALC-SCNC: 4 MMOL/L — LOW (ref 5–17)
BUN SERPL-MCNC: 14 MG/DL — SIGNIFICANT CHANGE UP (ref 7–23)
BUN SERPL-MCNC: 14 MG/DL — SIGNIFICANT CHANGE UP (ref 7–23)
CALCIUM SERPL-MCNC: 8.5 MG/DL — SIGNIFICANT CHANGE UP (ref 8.5–10.1)
CALCIUM SERPL-MCNC: 8.5 MG/DL — SIGNIFICANT CHANGE UP (ref 8.5–10.1)
CHLORIDE SERPL-SCNC: 109 MMOL/L — HIGH (ref 96–108)
CHLORIDE SERPL-SCNC: 109 MMOL/L — HIGH (ref 96–108)
CO2 SERPL-SCNC: 27 MMOL/L — SIGNIFICANT CHANGE UP (ref 22–31)
CO2 SERPL-SCNC: 27 MMOL/L — SIGNIFICANT CHANGE UP (ref 22–31)
CREAT SERPL-MCNC: 0.77 MG/DL — SIGNIFICANT CHANGE UP (ref 0.5–1.3)
CREAT SERPL-MCNC: 0.77 MG/DL — SIGNIFICANT CHANGE UP (ref 0.5–1.3)
CRP SERPL-MCNC: 20 MG/L — HIGH
CRP SERPL-MCNC: 20 MG/L — HIGH
CULTURE RESULTS: SIGNIFICANT CHANGE UP
CULTURE RESULTS: SIGNIFICANT CHANGE UP
EGFR: 94 ML/MIN/1.73M2 — SIGNIFICANT CHANGE UP
EGFR: 94 ML/MIN/1.73M2 — SIGNIFICANT CHANGE UP
ESTIMATED AVERAGE GLUCOSE: 114 MG/DL — SIGNIFICANT CHANGE UP (ref 68–114)
ESTIMATED AVERAGE GLUCOSE: 114 MG/DL — SIGNIFICANT CHANGE UP (ref 68–114)
GLUCOSE BLDC GLUCOMTR-MCNC: 100 MG/DL — HIGH (ref 70–99)
GLUCOSE BLDC GLUCOMTR-MCNC: 100 MG/DL — HIGH (ref 70–99)
GLUCOSE BLDC GLUCOMTR-MCNC: 112 MG/DL — HIGH (ref 70–99)
GLUCOSE BLDC GLUCOMTR-MCNC: 112 MG/DL — HIGH (ref 70–99)
GLUCOSE BLDC GLUCOMTR-MCNC: 125 MG/DL — HIGH (ref 70–99)
GLUCOSE BLDC GLUCOMTR-MCNC: 125 MG/DL — HIGH (ref 70–99)
GLUCOSE BLDC GLUCOMTR-MCNC: 91 MG/DL — SIGNIFICANT CHANGE UP (ref 70–99)
GLUCOSE BLDC GLUCOMTR-MCNC: 91 MG/DL — SIGNIFICANT CHANGE UP (ref 70–99)
GLUCOSE BLDC GLUCOMTR-MCNC: 93 MG/DL — SIGNIFICANT CHANGE UP (ref 70–99)
GLUCOSE BLDC GLUCOMTR-MCNC: 93 MG/DL — SIGNIFICANT CHANGE UP (ref 70–99)
GLUCOSE SERPL-MCNC: 112 MG/DL — HIGH (ref 70–99)
GLUCOSE SERPL-MCNC: 112 MG/DL — HIGH (ref 70–99)
HCT VFR BLD CALC: 38.7 % — LOW (ref 39–50)
HCT VFR BLD CALC: 38.7 % — LOW (ref 39–50)
HGB BLD-MCNC: 12.8 G/DL — LOW (ref 13–17)
HGB BLD-MCNC: 12.8 G/DL — LOW (ref 13–17)
MCHC RBC-ENTMCNC: 31.4 PG — SIGNIFICANT CHANGE UP (ref 27–34)
MCHC RBC-ENTMCNC: 31.4 PG — SIGNIFICANT CHANGE UP (ref 27–34)
MCHC RBC-ENTMCNC: 33.1 GM/DL — SIGNIFICANT CHANGE UP (ref 32–36)
MCHC RBC-ENTMCNC: 33.1 GM/DL — SIGNIFICANT CHANGE UP (ref 32–36)
MCV RBC AUTO: 95.1 FL — SIGNIFICANT CHANGE UP (ref 80–100)
MCV RBC AUTO: 95.1 FL — SIGNIFICANT CHANGE UP (ref 80–100)
PLATELET # BLD AUTO: 109 K/UL — LOW (ref 150–400)
PLATELET # BLD AUTO: 109 K/UL — LOW (ref 150–400)
POTASSIUM SERPL-MCNC: 4 MMOL/L — SIGNIFICANT CHANGE UP (ref 3.5–5.3)
POTASSIUM SERPL-MCNC: 4 MMOL/L — SIGNIFICANT CHANGE UP (ref 3.5–5.3)
POTASSIUM SERPL-SCNC: 4 MMOL/L — SIGNIFICANT CHANGE UP (ref 3.5–5.3)
POTASSIUM SERPL-SCNC: 4 MMOL/L — SIGNIFICANT CHANGE UP (ref 3.5–5.3)
RBC # BLD: 4.07 M/UL — LOW (ref 4.2–5.8)
RBC # BLD: 4.07 M/UL — LOW (ref 4.2–5.8)
RBC # FLD: 13 % — SIGNIFICANT CHANGE UP (ref 10.3–14.5)
RBC # FLD: 13 % — SIGNIFICANT CHANGE UP (ref 10.3–14.5)
SODIUM SERPL-SCNC: 140 MMOL/L — SIGNIFICANT CHANGE UP (ref 135–145)
SODIUM SERPL-SCNC: 140 MMOL/L — SIGNIFICANT CHANGE UP (ref 135–145)
SPECIMEN SOURCE: SIGNIFICANT CHANGE UP
SPECIMEN SOURCE: SIGNIFICANT CHANGE UP
WBC # BLD: 6.22 K/UL — SIGNIFICANT CHANGE UP (ref 3.8–10.5)
WBC # BLD: 6.22 K/UL — SIGNIFICANT CHANGE UP (ref 3.8–10.5)
WBC # FLD AUTO: 6.22 K/UL — SIGNIFICANT CHANGE UP (ref 3.8–10.5)
WBC # FLD AUTO: 6.22 K/UL — SIGNIFICANT CHANGE UP (ref 3.8–10.5)

## 2023-12-27 PROCEDURE — 99233 SBSQ HOSP IP/OBS HIGH 50: CPT

## 2023-12-27 RX ORDER — CEFAZOLIN SODIUM 1 G
1000 VIAL (EA) INJECTION EVERY 8 HOURS
Refills: 0 | Status: DISCONTINUED | OUTPATIENT
Start: 2023-12-27 | End: 2023-12-27

## 2023-12-27 RX ORDER — AMLODIPINE BESYLATE 2.5 MG/1
10 TABLET ORAL DAILY
Refills: 0 | Status: DISCONTINUED | OUTPATIENT
Start: 2023-12-27 | End: 2023-12-28

## 2023-12-27 RX ORDER — METFORMIN HYDROCHLORIDE 850 MG/1
500 TABLET ORAL
Refills: 0 | Status: DISCONTINUED | OUTPATIENT
Start: 2023-12-27 | End: 2023-12-28

## 2023-12-27 RX ORDER — ATORVASTATIN CALCIUM 80 MG/1
20 TABLET, FILM COATED ORAL AT BEDTIME
Refills: 0 | Status: DISCONTINUED | OUTPATIENT
Start: 2023-12-27 | End: 2023-12-28

## 2023-12-27 RX ORDER — RIVAROXABAN 15 MG-20MG
10 KIT ORAL
Refills: 0 | Status: DISCONTINUED | OUTPATIENT
Start: 2023-12-27 | End: 2023-12-28

## 2023-12-27 RX ORDER — CHOLECALCIFEROL (VITAMIN D3) 125 MCG
2000 CAPSULE ORAL DAILY
Refills: 0 | Status: DISCONTINUED | OUTPATIENT
Start: 2023-12-27 | End: 2023-12-28

## 2023-12-27 RX ORDER — LOSARTAN POTASSIUM 100 MG/1
100 TABLET, FILM COATED ORAL DAILY
Refills: 0 | Status: DISCONTINUED | OUTPATIENT
Start: 2023-12-27 | End: 2023-12-28

## 2023-12-27 RX ORDER — LINAGLIPTIN 5 MG/1
5 TABLET, FILM COATED ORAL DAILY
Refills: 0 | Status: DISCONTINUED | OUTPATIENT
Start: 2023-12-27 | End: 2023-12-28

## 2023-12-27 RX ORDER — CEFAZOLIN SODIUM 1 G
1000 VIAL (EA) INJECTION EVERY 8 HOURS
Refills: 0 | Status: DISCONTINUED | OUTPATIENT
Start: 2023-12-27 | End: 2023-12-28

## 2023-12-27 RX ADMIN — RIVAROXABAN 10 MILLIGRAM(S): KIT at 17:42

## 2023-12-27 RX ADMIN — Medication 2000 UNIT(S): at 09:44

## 2023-12-27 RX ADMIN — AMLODIPINE BESYLATE 10 MILLIGRAM(S): 2.5 TABLET ORAL at 10:02

## 2023-12-27 RX ADMIN — Medication 1 TABLET(S): at 09:44

## 2023-12-27 RX ADMIN — LOSARTAN POTASSIUM 100 MILLIGRAM(S): 100 TABLET, FILM COATED ORAL at 09:44

## 2023-12-27 RX ADMIN — Medication 1000 MILLIGRAM(S): at 14:04

## 2023-12-27 RX ADMIN — METFORMIN HYDROCHLORIDE 500 MILLIGRAM(S): 850 TABLET ORAL at 09:44

## 2023-12-27 RX ADMIN — METFORMIN HYDROCHLORIDE 500 MILLIGRAM(S): 850 TABLET ORAL at 21:41

## 2023-12-27 RX ADMIN — ATORVASTATIN CALCIUM 20 MILLIGRAM(S): 80 TABLET, FILM COATED ORAL at 21:41

## 2023-12-27 RX ADMIN — LINAGLIPTIN 5 MILLIGRAM(S): 5 TABLET, FILM COATED ORAL at 09:44

## 2023-12-27 RX ADMIN — SODIUM CHLORIDE 75 MILLILITER(S): 9 INJECTION INTRAMUSCULAR; INTRAVENOUS; SUBCUTANEOUS at 00:15

## 2023-12-27 RX ADMIN — Medication 1000 MILLIGRAM(S): at 21:41

## 2023-12-27 RX ADMIN — Medication 1000 MILLIGRAM(S): at 06:57

## 2023-12-27 NOTE — PROGRESS NOTE ADULT - NUTRITIONAL ASSESSMENT
This patient has been assessed with a concern for Malnutrition and has been determined to have a diagnosis/diagnoses of Severe protein-calorie malnutrition.    This patient is being managed with:   Diet Consistent Carbohydrate w/Evening Snack-  Entered: Dec 26 2023  8:33PM

## 2023-12-27 NOTE — DIETITIAN INITIAL EVALUATION ADULT - PERTINENT MEDS FT
MEDICATIONS  (STANDING):  amLODIPine   Tablet 10 milliGRAM(s) Oral daily  atorvastatin 20 milliGRAM(s) Oral at bedtime  ceFAZolin  Injectable. 1000 milliGRAM(s) IV Push every 8 hours  cholecalciferol 2000 Unit(s) Oral daily  dextrose 5%. 1000 milliLiter(s) (50 mL/Hr) IV Continuous <Continuous>  dextrose 5%. 1000 milliLiter(s) (100 mL/Hr) IV Continuous <Continuous>  dextrose 50% Injectable 25 Gram(s) IV Push once  dextrose 50% Injectable 12.5 Gram(s) IV Push once  dextrose 50% Injectable 25 Gram(s) IV Push once  glucagon  Injectable 1 milliGRAM(s) IntraMuscular once  insulin lispro (ADMELOG) corrective regimen sliding scale   SubCutaneous at bedtime  insulin lispro (ADMELOG) corrective regimen sliding scale   SubCutaneous three times a day before meals  linagliptin 5 milliGRAM(s) Oral daily  losartan 100 milliGRAM(s) Oral daily  metFORMIN 500 milliGRAM(s) Oral two times a day  multivitamin 1 Tablet(s) Oral daily  rivaroxaban 10 milliGRAM(s) Oral with dinner  sodium chloride 0.9%. 1000 milliLiter(s) (75 mL/Hr) IV Continuous <Continuous>    MEDICATIONS  (PRN):  acetaminophen     Tablet .. 650 milliGRAM(s) Oral once PRN Mild Pain (1 - 3)  aluminum hydroxide/magnesium hydroxide/simethicone Suspension 30 milliLiter(s) Oral every 4 hours PRN Dyspepsia  dextrose Oral Gel 15 Gram(s) Oral once PRN Blood Glucose LESS THAN 70 milliGRAM(s)/deciliter  melatonin 3 milliGRAM(s) Oral at bedtime PRN Insomnia  ondansetron Injectable 4 milliGRAM(s) IV Push every 8 hours PRN Nausea and/or Vomiting

## 2023-12-27 NOTE — DIETITIAN NUTRITION RISK NOTIFICATION - ADDITIONAL COMMENTS/DIETITIAN RECOMMENDATIONS
Liberalize diet to Regular to optimize po/nutrient intake.   MVI w/ minerals daily to ensure 100% RDA met   Ensure max tid  Consider adding thiamine 100 mg daily 2/2 poor PO intake/ malnutrition  Monitor bowel movements, if no BM for >3 days, consider implementing bowel regimen.  suggest Confirm Goals of Care regarding nutrition support  Blood glucose target of 140 to 180 mg/dL  Check HgbA1c   Monitor PO intake, tolerance, labs and weight.

## 2023-12-27 NOTE — DIETITIAN INITIAL EVALUATION ADULT - PERTINENT LABORATORY DATA
12-26    136  |  105  |  19  ----------------------------<  116<H>  3.9   |  29  |  1.06    Ca    9.0      26 Dec 2023 19:40    TPro  7.3  /  Alb  3.8  /  TBili  0.4  /  DBili  x   /  AST  21  /  ALT  25  /  AlkPhos  60  12-26  POCT Blood Glucose.: 125 mg/dL (12-27-23 @ 00:13)

## 2023-12-27 NOTE — PROGRESS NOTE ADULT - TIME BILLING
Time spent  coordinating the patient's care. This includes reviewing documentation pertinent to this admission, results and imaging. Further tests, medications, and procedures have been ordered as indicated. Laboratory results and the plan of care were communicated to the patient and wife. Supporting documentation was completed and added to the patient's chart.

## 2023-12-27 NOTE — PROGRESS NOTE ADULT - ASSESSMENT
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  He says that it does not hurt too much but it is very tender and very warm on touch. He feels cold and chills, bur denies fever.  He denies stepping on any foreign object or any injury to his leg/ankle or twisting ankle.  He further added that he has similar symptoms in past when he had cellulitis to the same area. Otherwise, he denies laft leg claudication, chest pain, sob, palpitation, N/V, abdominal pain, diarrhea or dysuria.  Denies smoking, alcohol or substance abuse.      # Acute cellulitis of left lower leg, left ankle and dorsum of left foot.  -has prior DVT and cellulitis same leg  -BCx pending  -improving on ancef, continue and anticipate d/c on Keflex      # HTN and Dyslipidemia.  -Stable and controlled.  -Continue his Amlodipine, Losartan and Atorvastatin.    # DM-type 2, seems to be controlled.  -. Last A1c level was 7.7 on 06/24/2021.  -A1c pending  -ISS with coverage.  -Continue his januvia and Metformin.  -Accu-chek q AC & HS.    # H/o Crohn's disease.  -On Entyvio infusion [id number].    # Thrombocytopenia, likely reactive.  -trend    # H/o LLE DVT.  -On Xarelto.    CODE STATUS: FULL CODE.    Possible d/c 12/28 if cellulitis continues to improve.             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  He says that it does not hurt too much but it is very tender and very warm on touch. He feels cold and chills, bur denies fever.  He denies stepping on any foreign object or any injury to his leg/ankle or twisting ankle.  He further added that he has similar symptoms in past when he had cellulitis to the same area. Otherwise, he denies laft leg claudication, chest pain, sob, palpitation, N/V, abdominal pain, diarrhea or dysuria.  Denies smoking, alcohol or substance abuse.      # Acute cellulitis of left lower leg, left ankle and dorsum of left foot.  -has prior DVT and cellulitis same leg  -BCx pending  -improving on ancef, continue and anticipate d/c on Keflex      # HTN and Dyslipidemia.  -Stable and controlled.  -Continue his Amlodipine, Losartan and Atorvastatin.    # DM-type 2, seems to be controlled.  -. Last A1c level was 7.7 on 06/24/2021.  -A1c pending  -ISS with coverage.  -Continue his januvia and Metformin.  -Accu-chek q AC & HS.    # H/o Crohn's disease.  -On Entyvio infusion [id number].    # Thrombocytopenia, likely reactive.  -trend    # H/o LLE DVT.  -On Xarelto.    CODE STATUS: FULL CODE.    Possible d/c 12/28 if cellulitis continues to improve.

## 2023-12-27 NOTE — DIETITIAN INITIAL EVALUATION ADULT - NAME AND PHONE
Reyna Franklin RDN, CDN, [facility]      [phone]   [email or web address]  Reyna Franklin RDN, CDN, [facility]      [phone]   [email or web address]

## 2023-12-27 NOTE — DIETITIAN INITIAL EVALUATION ADULT - ORAL INTAKE PTA/DIET HISTORY
MST 2 or >  Pt's wife shops and cooks, they eat three meals a day.  Based on dietary recall, PO intake estimated < 75% ENN > one month

## 2023-12-27 NOTE — SBIRT NOTE ADULT - NSSBIRTUNABLESCROTHER_GEN_A_CORE
ETOH score is 4 and WNL. No additional indicators for SUDs. SBIRT not indicated and Consult to be closed.

## 2023-12-27 NOTE — PROGRESS NOTE ADULT - SUBJECTIVE AND OBJECTIVE BOX
HOSPITALIST ATTENDING PROGRESS NOTE    Chart and meds reviewed.  Patient seen and examined.    CC: cellulitis    Subjective: LLE cellulitis improving, edema and erythema much improved from images from day prior. Updated wife at bedside.    All other systems reviewed and found to be negative with the exception of what has been described above.    MEDICATIONS  (STANDING):  amLODIPine   Tablet 10 milliGRAM(s) Oral daily  atorvastatin 20 milliGRAM(s) Oral at bedtime  ceFAZolin  Injectable. 1000 milliGRAM(s) IV Push every 8 hours  cholecalciferol 2000 Unit(s) Oral daily  dextrose 5%. 1000 milliLiter(s) (100 mL/Hr) IV Continuous <Continuous>  dextrose 5%. 1000 milliLiter(s) (50 mL/Hr) IV Continuous <Continuous>  dextrose 50% Injectable 12.5 Gram(s) IV Push once  dextrose 50% Injectable 25 Gram(s) IV Push once  dextrose 50% Injectable 25 Gram(s) IV Push once  glucagon  Injectable 1 milliGRAM(s) IntraMuscular once  insulin lispro (ADMELOG) corrective regimen sliding scale   SubCutaneous three times a day before meals  insulin lispro (ADMELOG) corrective regimen sliding scale   SubCutaneous at bedtime  linagliptin 5 milliGRAM(s) Oral daily  losartan 100 milliGRAM(s) Oral daily  metFORMIN 500 milliGRAM(s) Oral two times a day  multivitamin 1 Tablet(s) Oral daily  rivaroxaban 10 milliGRAM(s) Oral with dinner    MEDICATIONS  (PRN):  acetaminophen     Tablet .. 650 milliGRAM(s) Oral once PRN Mild Pain (1 - 3)  aluminum hydroxide/magnesium hydroxide/simethicone Suspension 30 milliLiter(s) Oral every 4 hours PRN Dyspepsia  dextrose Oral Gel 15 Gram(s) Oral once PRN Blood Glucose LESS THAN 70 milliGRAM(s)/deciliter  melatonin 3 milliGRAM(s) Oral at bedtime PRN Insomnia  ondansetron Injectable 4 milliGRAM(s) IV Push every 8 hours PRN Nausea and/or Vomiting      VITALS:  T(F): 98.6 (12-27-23 @ 16:06), Max: 98.6 (12-27-23 @ 16:06)  HR: 90 (12-27-23 @ 16:06) (85 - 90)  BP: 110/67 (12-27-23 @ 16:06) (110/67 - 154/87)  RR: 18 (12-27-23 @ 16:06) (18 - 18)  SpO2: 98% (12-27-23 @ 16:06) (98% - 100%)  Wt(kg): --    I&O's Summary      CAPILLARY BLOOD GLUCOSE      POCT Blood Glucose.: 91 mg/dL (27 Dec 2023 12:26)  POCT Blood Glucose.: 112 mg/dL (27 Dec 2023 08:07)  POCT Blood Glucose.: 125 mg/dL (27 Dec 2023 00:13)      PHYSICAL EXAM:  Gen: NAD  HEENT:  pupils equal and reactive, EOMI, no oropharyngeal lesions, erythema, exudates, oral thrush  NECK:   supple, no carotid bruits, no palpable lymph nodes, no thyromegaly  CV:  +S1, +S2, regular, no murmurs or rubs  RESP:   lungs clear to auscultation bilaterally, no wheezing, rales, rhonchi, good air entry bilaterally  BREAST:  not examined  GI:  abdomen soft, non-tender, non-distended, normal BS, no bruits, no abdominal masses, no palpable masses  RECTAL:  not examined  :  not examined  MSK:   normal muscle tone, no atrophy, no rigidity, no contractions  EXT:  LLE edema, erythema on dorsum of food and around medial malleolus  VASCULAR:  pulses equal and symmetric in the upper and lower extremities  NEURO:  AAOX3, no focal neurological deficits, follows all commands, able to move extremities spontaneously  SKIN:  no ulcers, lesions or rashes    LABS:                            12.8   6.22  )-----------( 109      ( 27 Dec 2023 08:55 )             38.7     12-27    140  |  109<H>  |  14  ----------------------------<  112<H>  4.0   |  27  |  0.77    Ca    8.5      27 Dec 2023 08:55    TPro  7.3  /  Alb  3.8  /  TBili  0.4  /  DBili  x   /  AST  21  /  ALT  25  /  AlkPhos  60  12-26        LIVER FUNCTIONS - ( 26 Dec 2023 19:40 )  Alb: 3.8 g/dL / Pro: 7.3 gm/dL / ALK PHOS: 60 U/L / ALT: 25 U/L / AST: 21 U/L / GGT: x           PT/INR - ( 26 Dec 2023 19:40 )   PT: 15.8 sec;   INR: 1.41 ratio         PTT - ( 26 Dec 2023 19:40 )  PTT:36.0 sec  Urinalysis Basic - ( 27 Dec 2023 08:55 )    Color: x / Appearance: x / SG: x / pH: x  Gluc: 112 mg/dL / Ketone: x  / Bili: x / Urobili: x   Blood: x / Protein: x / Nitrite: x   Leuk Esterase: x / RBC: x / WBC x   Sq Epi: x / Non Sq Epi: x / Bacteria: x        Lactate, Blood: 1.1 mmol/L (12-26 @ 19:40)        CULTURES:  Blood, Urine: Trace (12-26 @ 19:48)  BCx pending    Additional results/Imaging, I have personally reviewed:

## 2023-12-27 NOTE — DIETITIAN INITIAL EVALUATION ADULT - OTHER INFO
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  He says that it does not hurt too much but it is very tender and very warm on touch. He feels cold and chills, bur denies fever.  He denies stepping on any foreign object or any injury to his leg/ankle or twisting ankle.  He further added that he has similar symptoms in past when he had cellulitis to the same area. Otherwise, he denies laft leg claudication, chest pain, sob, palpitation, N/V, abdominal pain, diarrhea or dysuria.  Denies smoking, alcohol or substance abuse.  Vitals stable.    Admit dx  cellulitis of lower extremity  Visited Pt at bedside, pt appears very thin  EMR  wt is 86 kg   189#  Unable to get bedscale wt  Pt height is 6'5"  Pt on POCT  Pt with dx of T2Dm, on metformin and Januvia at home  on Metformin at , SSI  Pt EMR has Crohn's disease as dx, however pt reports that he was diagnosed and has colitis.  Pt on Entyvio, and responding well  Pt HgbA1c on 6/24/2021 was 7.7.  Pt reports more recent A1c was <6.  HgbA1c pending  Pt reports he does not always monitor BGs, pt urged to do so.  Suggest add Ensure plus tid  Pt on protein powder at home  Confirm Goals of Care regarding nutrition support   Recommendations to follow in Plan/Intervention

## 2023-12-28 ENCOUNTER — TRANSCRIPTION ENCOUNTER (OUTPATIENT)
Age: 73
End: 2023-12-28

## 2023-12-28 VITALS
SYSTOLIC BLOOD PRESSURE: 139 MMHG | RESPIRATION RATE: 18 BRPM | DIASTOLIC BLOOD PRESSURE: 84 MMHG | OXYGEN SATURATION: 96 % | HEART RATE: 89 BPM | TEMPERATURE: 98 F

## 2023-12-28 LAB
GLUCOSE BLDC GLUCOMTR-MCNC: 101 MG/DL — HIGH (ref 70–99)
GLUCOSE BLDC GLUCOMTR-MCNC: 101 MG/DL — HIGH (ref 70–99)
GLUCOSE BLDC GLUCOMTR-MCNC: 93 MG/DL — SIGNIFICANT CHANGE UP (ref 70–99)
GLUCOSE BLDC GLUCOMTR-MCNC: 93 MG/DL — SIGNIFICANT CHANGE UP (ref 70–99)
HCT VFR BLD CALC: 37.4 % — LOW (ref 39–50)
HCT VFR BLD CALC: 37.4 % — LOW (ref 39–50)
HGB BLD-MCNC: 12.5 G/DL — LOW (ref 13–17)
HGB BLD-MCNC: 12.5 G/DL — LOW (ref 13–17)
MCHC RBC-ENTMCNC: 31.6 PG — SIGNIFICANT CHANGE UP (ref 27–34)
MCHC RBC-ENTMCNC: 31.6 PG — SIGNIFICANT CHANGE UP (ref 27–34)
MCHC RBC-ENTMCNC: 33.4 GM/DL — SIGNIFICANT CHANGE UP (ref 32–36)
MCHC RBC-ENTMCNC: 33.4 GM/DL — SIGNIFICANT CHANGE UP (ref 32–36)
MCV RBC AUTO: 94.4 FL — SIGNIFICANT CHANGE UP (ref 80–100)
MCV RBC AUTO: 94.4 FL — SIGNIFICANT CHANGE UP (ref 80–100)
PLATELET # BLD AUTO: 108 K/UL — LOW (ref 150–400)
PLATELET # BLD AUTO: 108 K/UL — LOW (ref 150–400)
RBC # BLD: 3.96 M/UL — LOW (ref 4.2–5.8)
RBC # BLD: 3.96 M/UL — LOW (ref 4.2–5.8)
RBC # FLD: 13 % — SIGNIFICANT CHANGE UP (ref 10.3–14.5)
RBC # FLD: 13 % — SIGNIFICANT CHANGE UP (ref 10.3–14.5)
WBC # BLD: 5.35 K/UL — SIGNIFICANT CHANGE UP (ref 3.8–10.5)
WBC # BLD: 5.35 K/UL — SIGNIFICANT CHANGE UP (ref 3.8–10.5)
WBC # FLD AUTO: 5.35 K/UL — SIGNIFICANT CHANGE UP (ref 3.8–10.5)
WBC # FLD AUTO: 5.35 K/UL — SIGNIFICANT CHANGE UP (ref 3.8–10.5)

## 2023-12-28 PROCEDURE — 99239 HOSP IP/OBS DSCHRG MGMT >30: CPT

## 2023-12-28 RX ORDER — CEPHALEXIN 500 MG
1 CAPSULE ORAL
Qty: 20 | Refills: 0
Start: 2023-12-28 | End: 2024-01-01

## 2023-12-28 RX ADMIN — METFORMIN HYDROCHLORIDE 500 MILLIGRAM(S): 850 TABLET ORAL at 11:47

## 2023-12-28 RX ADMIN — LINAGLIPTIN 5 MILLIGRAM(S): 5 TABLET, FILM COATED ORAL at 11:47

## 2023-12-28 RX ADMIN — Medication 2000 UNIT(S): at 11:47

## 2023-12-28 RX ADMIN — Medication 1000 MILLIGRAM(S): at 05:32

## 2023-12-28 RX ADMIN — LOSARTAN POTASSIUM 100 MILLIGRAM(S): 100 TABLET, FILM COATED ORAL at 11:47

## 2023-12-28 RX ADMIN — Medication 1 TABLET(S): at 11:47

## 2023-12-28 RX ADMIN — AMLODIPINE BESYLATE 10 MILLIGRAM(S): 2.5 TABLET ORAL at 11:47

## 2023-12-28 NOTE — DISCHARGE NOTE PROVIDER - DETAILS OF MALNUTRITION DIAGNOSIS/DIAGNOSES
This patient has been assessed with a concern for Malnutrition and was treated during this hospitalization for the following Nutrition diagnosis/diagnoses:     -  12/27/2023: Severe protein-calorie malnutrition

## 2023-12-28 NOTE — DISCHARGE NOTE NURSING/CASE MANAGEMENT/SOCIAL WORK - PATIENT PORTAL LINK FT
You can access the FollowMyHealth Patient Portal offered by [facility] by registering at the following website: [email or web address]. By joining [employer]’s FollowMyHealth portal, you will also be able to view your health information using other applications (apps) compatible with our system. You can access the FollowMyHealth Patient Portal offered by [facility] by registering at the following website: [email or web address]. By joining [employer]’s FollowMyHealth portal, you will also be able to view your health information using other applications (apps) compatible with our system.

## 2023-12-28 NOTE — DISCHARGE NOTE NURSING/CASE MANAGEMENT/SOCIAL WORK - NSDCPEFALRISK_GEN_ALL_CORE
For information on Fall & Injury Prevention, visit: [email or web address] OR  [email or web address] OR  https://www.cdc.gov/steadi/patient.html For information on Fall & Injury Prevention, visit: [email or web address] OR  [email or web address] OR  https://www.cdc.gov/steadi/patient.html

## 2023-12-28 NOTE — DISCHARGE NOTE PROVIDER - NSDCPNSUBOBJ_GEN_ALL_CORE
VITALS:  T(F): 97.9 (12-28-23 @ 08:10), Max: 98.6 (12-27-23 @ 16:06)  HR: 89 (12-28-23 @ 08:10) (88 - 90)  BP: 139/84 (12-28-23 @ 08:10) (110/67 - 139/84)  RR: 18 (12-28-23 @ 08:10) (18 - 18)  SpO2: 96% (12-28-23 @ 08:10) (96% - 98%)    PHYSICAL EXAM:  Gen: NAD  HEENT:  pupils equal and reactive, EOMI, no oropharyngeal lesions, erythema, exudates, oral thrush  NECK:   supple, no carotid bruits, no palpable lymph nodes, no thyromegaly  CV:  +S1, +S2, regular, no murmurs or rubs  RESP:   lungs clear to auscultation bilaterally, no wheezing, rales, rhonchi, good air entry bilaterally  BREAST:  not examined  GI:  abdomen soft, non-tender, non-distended, normal BS, no bruits, no abdominal masses, no palpable masses  RECTAL:  not examined  :  not examined  MSK:   normal muscle tone, no atrophy, no rigidity, no contractions  EXT:  LLE swelling much improved, erythema much improved  VASCULAR:  pulses equal and symmetric in the upper and lower extremities  NEURO:  AAOX3, no focal neurological deficits, follows all commands, able to move extremities spontaneously  SKIN:  no ulcers, lesions or rashes    BCx NG  UCx <10K    # Acute cellulitis of left lower leg, left ankle and dorsum of left foot.  -has prior DVT and cellulitis same leg  -BCx NG  -improving on ancef, d/c on po keflex x 5d.    # HTN and Dyslipidemia.  -Stable and controlled.  -Continue his Amlodipine, Losartan and Atorvastatin.    # DM-type 2, seems to be controlled.  -. Last A1c level was 7.7 on 06/24/2021.  -ISS with coverage.  -Continue his januvia and Metformin.  -Accu-chek q AC & HS.    # H/o Crohn's disease.  -On Entyvio infusion [id number].    # Thrombocytopenia, likely reactive.  -trend    # H/o LLE DVT.  -On Xarelto.    CODE STATUS: FULL CODE.       VITALS:  T(F): 97.9 (12-28-23 @ 08:10), Max: 98.6 (12-27-23 @ 16:06)  HR: 89 (12-28-23 @ 08:10) (88 - 90)  BP: 139/84 (12-28-23 @ 08:10) (110/67 - 139/84)  RR: 18 (12-28-23 @ 08:10) (18 - 18)  SpO2: 96% (12-28-23 @ 08:10) (96% - 98%)    PHYSICAL EXAM:  Gen: NAD  HEENT:  pupils equal and reactive, EOMI, no oropharyngeal lesions, erythema, exudates, oral thrush  NECK:   supple, no carotid bruits, no palpable lymph nodes, no thyromegaly  CV:  +S1, +S2, regular, no murmurs or rubs  RESP:   lungs clear to auscultation bilaterally, no wheezing, rales, rhonchi, good air entry bilaterally  BREAST:  not examined  GI:  abdomen soft, non-tender, non-distended, normal BS, no bruits, no abdominal masses, no palpable masses  RECTAL:  not examined  :  not examined  MSK:   normal muscle tone, no atrophy, no rigidity, no contractions  EXT:  LLE swelling much improved, erythema much improved  VASCULAR:  pulses equal and symmetric in the upper and lower extremities  NEURO:  AAOX3, no focal neurological deficits, follows all commands, able to move extremities spontaneously  SKIN:  no ulcers, lesions or rashes    BCx NG  UCx <10K    # Acute cellulitis of left lower leg, left ankle and dorsum of left foot.  -has prior DVT and cellulitis same leg  -BCx NG  -improving on ancef, d/c on po keflex x 5d.    # HTN and Dyslipidemia.  -Stable and controlled.  -Continue his Amlodipine, Losartan and Atorvastatin.    # DM-type 2, seems to be controlled.  -. Last A1c level was 7.7 on 06/24/2021.  -ISS with coverage.  -Continue his januvia and Metformin.  -Accu-chek q AC & HS.    # H/o Crohn's disease.  -On Entyvio infusion [id number].    # Thrombocytopenia, likely reactive.  -trend    # H/o LLE DVT.  -On Xarelto.    CODE STATUS: FULL CODE.

## 2023-12-28 NOTE — DISCHARGE NOTE PROVIDER - NSDCMRMEDTOKEN_GEN_ALL_CORE_FT
amLODIPine 10 mg oral tablet: 1 tab(s) orally once a day  ascorbic acid 500 mg oral tablet: 2 tab(s) orally once a day  atorvastatin 20 mg oral tablet: 1 tab(s) orally once a day (at bedtime)  cephalexin 500 mg oral tablet: 1 tab(s) orally 4 times a day  Entyvio 300 mg intravenous injection: 300 milligram(s) intravenously every 8 weeks  Januvia 100 mg oral tablet: 1 tab(s) orally once a day  losartan 100 mg oral tablet: 1 tab(s) orally once a day  magnesium carbonate 250 mg oral capsule: 1 cap(s) orally once a day  metFORMIN 500 mg oral tablet, extended release: 2 tab(s) orally once a day  Multiple Vitamins oral tablet: 1 tab(s) orally once a day  Vitamin D3 2000 intl units (50 mcg) oral capsule: 1 cap(s) orally once a day  Xarelto 10 mg oral tablet: 1 tab(s) orally once a day (at bedtime)

## 2023-12-28 NOTE — DISCHARGE NOTE PROVIDER - HOSPITAL COURSE
CC: cellulitis  HPI and Hospital Course: The patient is a 73y year old Male with significant PMH of Crohn's disease on Entyvio infusion every 8 weeks, DM-2, LLE DVT on Xarelto for many years, HTN, Thrombocytopenia and others presented in ED with c/o left leg swelling, pain and erythema since yesterday morning.  He says that it does not hurt too much but it is very tender and very warm on touch. He feels cold and chills, bur denies fever.  He denies stepping on any foreign object or any injury to his leg/ankle or twisting ankle.  He further added that he has similar symptoms in past when he had cellulitis to the same area. Otherwise, he denies laft leg claudication, chest pain, sob, palpitation, N/V, abdominal pain, diarrhea or dysuria.  Denies smoking, alcohol or substance abuse.    BCx NG, great improvement w iv ancef, d/c on po keflex to complete course.    D/c to home.   I have spent 32 min on day of d/c coordinating care.  See progress note for problem based plan.

## 2023-12-28 NOTE — DISCHARGE NOTE PROVIDER - NSDCFUSCHEDAPPT_GEN_ALL_CORE_FT
[facility] Physician [facility] [address]  Scheduled Appointment: 01/16/2024     [facility] Physician [facility] [address]  Scheduled Appointment: 01/16/2024

## 2023-12-28 NOTE — DISCHARGE NOTE PROVIDER - NSDCCPCAREPLAN_GEN_ALL_CORE_FT
PRINCIPAL DISCHARGE DIAGNOSIS  Diagnosis: Cellulitis of left foot  Assessment and Plan of Treatment: Take keflex for 5 more days to complete course.

## 2023-12-28 NOTE — DISCHARGE NOTE PROVIDER - NSDCCAREPROVSEEN_GEN_ALL_CORE_FT
Tiffany Henao ([facility] medicine) Tiffany Henao ([facility] medicine)

## 2023-12-28 NOTE — DISCHARGE NOTE PROVIDER - CARE PROVIDER_API CALL
[place] [address]  Phone: [phone]  Fax: [phone]  Follow Up Time: 1 week   E[place] [address]  Phone: [phone]  Fax: [phone]  Follow Up Time: 1 week

## 2023-12-28 NOTE — DISCHARGE NOTE PROVIDER - PROVIDER TOKENS
PROVIDER:[TOKEN:[[id number]],FOLLOWUP:[1 week]] PROVIDER:[TOKEN:[[id number]],FOLLOWUP:[1 week]]

## 2024-01-01 LAB
CULTURE RESULTS: SIGNIFICANT CHANGE UP
SPECIMEN SOURCE: SIGNIFICANT CHANGE UP

## 2024-01-05 DIAGNOSIS — I10 ESSENTIAL (PRIMARY) HYPERTENSION: ICD-10-CM

## 2024-01-05 DIAGNOSIS — E78.5 HYPERLIPIDEMIA, UNSPECIFIED: ICD-10-CM

## 2024-01-05 DIAGNOSIS — K50.90 CROHN'S DISEASE, UNSPECIFIED, WITHOUT COMPLICATIONS: ICD-10-CM

## 2024-01-05 DIAGNOSIS — E11.9 TYPE 2 DIABETES MELLITUS WITHOUT COMPLICATIONS: ICD-10-CM

## 2024-01-05 DIAGNOSIS — D69.6 THROMBOCYTOPENIA, UNSPECIFIED: ICD-10-CM

## 2024-01-05 DIAGNOSIS — Z79.01 LONG TERM (CURRENT) USE OF ANTICOAGULANTS: ICD-10-CM

## 2024-01-05 DIAGNOSIS — Z79.84 LONG TERM (CURRENT) USE OF ORAL HYPOGLYCEMIC DRUGS: ICD-10-CM

## 2024-01-05 DIAGNOSIS — E43 UNSPECIFIED SEVERE PROTEIN-CALORIE MALNUTRITION: ICD-10-CM

## 2024-01-05 DIAGNOSIS — Z86.718 PERSONAL HISTORY OF OTHER VENOUS THROMBOSIS AND EMBOLISM: ICD-10-CM

## 2024-01-05 DIAGNOSIS — L03.116 CELLULITIS OF LEFT LOWER LIMB: ICD-10-CM

## 2024-01-06 PROBLEM — Z79.01 LONG TERM (CURRENT) USE OF ANTICOAGULANTS: Chronic | Status: ACTIVE | Noted: 2023-12-27

## 2024-01-06 PROBLEM — E11.9 TYPE 2 DIABETES MELLITUS WITHOUT COMPLICATIONS: Chronic | Status: ACTIVE | Noted: 2023-12-27

## 2024-01-06 PROBLEM — D69.6 THROMBOCYTOPENIA, UNSPECIFIED: Chronic | Status: ACTIVE | Noted: 2023-12-27

## 2024-01-06 PROBLEM — Z86.718 PERSONAL HISTORY OF OTHER VENOUS THROMBOSIS AND EMBOLISM: Chronic | Status: ACTIVE | Noted: 2023-12-27

## 2024-01-08 ENCOUNTER — OUTPATIENT (OUTPATIENT)
Dept: OUTPATIENT SERVICES | Facility: HOSPITAL | Age: 74
LOS: 1 days | End: 2024-01-08
Payer: MEDICARE

## 2024-01-08 ENCOUNTER — APPOINTMENT (OUTPATIENT)
Dept: RADIOLOGY | Facility: CLINIC | Age: 74
End: 2024-01-08
Payer: MEDICARE

## 2024-01-08 DIAGNOSIS — R05.9 COUGH, UNSPECIFIED: ICD-10-CM

## 2024-01-08 DIAGNOSIS — Z00.8 ENCOUNTER FOR OTHER GENERAL EXAMINATION: ICD-10-CM

## 2024-01-08 PROCEDURE — 71046 X-RAY EXAM CHEST 2 VIEWS: CPT | Mod: 26

## 2024-01-08 PROCEDURE — 71046 X-RAY EXAM CHEST 2 VIEWS: CPT

## 2024-01-09 RX ORDER — VEDOLIZUMAB 300 MG/5ML
300 INJECTION, POWDER, LYOPHILIZED, FOR SOLUTION INTRAVENOUS
Refills: 0 | Status: ACTIVE | OUTPATIENT
Start: 2024-01-09 | End: 1900-01-01

## 2024-01-09 RX ORDER — CETIRIZINE HYDROCHLORIDE 10 MG/1
10 TABLET, COATED ORAL
Refills: 0 | Status: ACTIVE | OUTPATIENT
Start: 2024-01-09 | End: 1900-01-01

## 2024-01-16 ENCOUNTER — NON-APPOINTMENT (OUTPATIENT)
Age: 74
End: 2024-01-16

## 2024-01-31 ENCOUNTER — APPOINTMENT (OUTPATIENT)
Dept: RHEUMATOLOGY | Facility: CLINIC | Age: 74
End: 2024-01-31

## 2024-01-31 VITALS
OXYGEN SATURATION: 98 % | DIASTOLIC BLOOD PRESSURE: 84 MMHG | HEART RATE: 82 BPM | TEMPERATURE: 98 F | RESPIRATION RATE: 17 BRPM | SYSTOLIC BLOOD PRESSURE: 132 MMHG

## 2024-01-31 VITALS
SYSTOLIC BLOOD PRESSURE: 144 MMHG | HEART RATE: 86 BPM | OXYGEN SATURATION: 99 % | DIASTOLIC BLOOD PRESSURE: 87 MMHG | RESPIRATION RATE: 18 BRPM | TEMPERATURE: 96.5 F

## 2024-01-31 RX ORDER — CETIRIZINE HYDROCHLORIDE 10 MG/1
10 TABLET, COATED ORAL
Qty: 0 | Refills: 0 | Status: COMPLETED
Start: 2024-01-09

## 2024-01-31 RX ORDER — ACETAMINOPHEN 325 MG/1
325 TABLET ORAL
Qty: 0 | Refills: 0 | Status: COMPLETED
Start: 2023-02-13

## 2024-01-31 RX ORDER — VEDOLIZUMAB 300 MG/5ML
300 INJECTION, POWDER, LYOPHILIZED, FOR SOLUTION INTRAVENOUS
Qty: 0 | Refills: 0 | Status: COMPLETED
Start: 2024-01-09

## 2024-02-01 NOTE — HISTORY OF PRESENT ILLNESS
[N/A] : N/A [Denies] : Denies [No] : No [Yes] : Yes [Declined] : Declined [TB] : Tuberculosis screening [Hep acute panel] : Hepatitis acute panel [Left upper extremity] : Left upper extremity [24g] : 24g [Start Time: ___] : Medication Start Time: [unfilled] [End Time: ___] : Medication End Time: [unfilled] [Medication Name: ___] : Medication Name: [unfilled] [Total Amount Administered: ___] : Total Amount Administered: [unfilled] [IV discontinued. Intact. No signs or symptoms of IV complications noted. Time: ___] : IV discontinued. Intact. No signs or symptoms of IV complications noted. Time: [unfilled] [Patient  instructed to seek medical attention with signs and symptoms of adverse effects] : Patient  instructed to seek medical attention with signs and symptoms of adverse effects [Patient left unit in no acute distress] : Patient left unit in no acute distress [Medications administered as ordered and tolerated well.] : Medications administered as ordered and tolerated well. [de-identified] : Left forearm [de-identified] : 09:15 am

## 2024-03-21 RX ORDER — RIVAROXABAN 10 MG/1
10 TABLET, FILM COATED ORAL
Qty: 90 | Refills: 3 | Status: ACTIVE | COMMUNITY
Start: 2018-06-27 | End: 1900-01-01

## 2024-03-25 ENCOUNTER — APPOINTMENT (OUTPATIENT)
Dept: RHEUMATOLOGY | Facility: CLINIC | Age: 74
End: 2024-03-25
Payer: MEDICARE

## 2024-03-25 VITALS
TEMPERATURE: 98 F | DIASTOLIC BLOOD PRESSURE: 70 MMHG | OXYGEN SATURATION: 98 % | RESPIRATION RATE: 17 BRPM | HEART RATE: 89 BPM | SYSTOLIC BLOOD PRESSURE: 107 MMHG

## 2024-03-25 VITALS
TEMPERATURE: 98 F | SYSTOLIC BLOOD PRESSURE: 121 MMHG | HEART RATE: 84 BPM | DIASTOLIC BLOOD PRESSURE: 74 MMHG | RESPIRATION RATE: 16 BRPM | OXYGEN SATURATION: 98 %

## 2024-03-25 PROCEDURE — 96365 THER/PROPH/DIAG IV INF INIT: CPT

## 2024-03-25 RX ORDER — ACETAMINOPHEN 325 MG/1
325 TABLET ORAL
Qty: 0 | Refills: 0 | Status: COMPLETED
Start: 2023-04-10

## 2024-03-25 RX ORDER — VEDOLIZUMAB 300 MG/5ML
300 INJECTION, POWDER, LYOPHILIZED, FOR SOLUTION INTRAVENOUS
Qty: 0 | Refills: 0 | Status: COMPLETED
Start: 2024-01-09

## 2024-03-25 RX ORDER — CETIRIZINE HYDROCHLORIDE 10 MG/1
10 TABLET, COATED ORAL
Qty: 0 | Refills: 0 | Status: COMPLETED
Start: 2024-01-09

## 2024-03-25 NOTE — HISTORY OF PRESENT ILLNESS
[Denies] : Denies [No] : No [Yes] : Yes [Declined] : Declined [TB] : Tuberculosis screening [Hep acute panel] : Hepatitis acute panel [Left upper extremity] : Left upper extremity [22g] : 22g [Start Time: ___] : Medication Start Time: [unfilled] [End Time: ___] : Medication End Time: [unfilled] [Total Amount Administered: ___] : Total Amount Administered: [unfilled] [IV discontinued. Intact. No signs or symptoms of IV complications noted. Time: ___] : IV discontinued. Intact. No signs or symptoms of IV complications noted. Time: [unfilled] [Patient  instructed to seek medical attention with signs and symptoms of adverse effects] : Patient  instructed to seek medical attention with signs and symptoms of adverse effects [Patient left unit in no acute distress] : Patient left unit in no acute distress [Medications administered as ordered and tolerated well.] : Medications administered as ordered and tolerated well. [de-identified] : 8:30 AM

## 2024-04-19 ENCOUNTER — APPOINTMENT (OUTPATIENT)
Dept: GASTROENTEROLOGY | Facility: CLINIC | Age: 74
End: 2024-04-19
Payer: MEDICARE

## 2024-04-19 VITALS
DIASTOLIC BLOOD PRESSURE: 72 MMHG | HEIGHT: 78 IN | HEART RATE: 79 BPM | SYSTOLIC BLOOD PRESSURE: 129 MMHG | WEIGHT: 175 LBS | BODY MASS INDEX: 20.25 KG/M2 | OXYGEN SATURATION: 98 %

## 2024-04-19 DIAGNOSIS — K51.90 ULCERATIVE COLITIS, UNSPECIFIED, W/OUT COMPLICATIONS: ICD-10-CM

## 2024-04-19 PROCEDURE — 99214 OFFICE O/P EST MOD 30 MIN: CPT

## 2024-04-19 RX ORDER — SODIUM SULFATE, POTASSIUM SULFATE AND MAGNESIUM SULFATE 1.6; 3.13; 17.5 G/177ML; G/177ML; G/177ML
17.5-3.13-1.6 SOLUTION ORAL
Qty: 1 | Refills: 0 | Status: ACTIVE | COMMUNITY
Start: 2024-04-19 | End: 1900-01-01

## 2024-04-19 RX ORDER — SODIUM PICOSULFATE, MAGNESIUM OXIDE, AND ANHYDROUS CITRIC ACID 12; 3.5; 1 G/175ML; G/175ML; MG/175ML
10-3.5-12 MG-GM LIQUID ORAL
Qty: 1 | Refills: 0 | Status: ACTIVE | COMMUNITY
Start: 2024-04-19 | End: 1900-01-01

## 2024-05-14 RX ORDER — CETIRIZINE HYDROCHLORIDE 10 MG/1
10 TABLET, COATED ORAL
Refills: 0 | Status: COMPLETED | OUTPATIENT
Start: 2024-05-14 | End: 1900-01-01

## 2024-05-20 ENCOUNTER — APPOINTMENT (OUTPATIENT)
Dept: RHEUMATOLOGY | Facility: CLINIC | Age: 74
End: 2024-05-20
Payer: MEDICARE

## 2024-05-20 PROCEDURE — 96365 THER/PROPH/DIAG IV INF INIT: CPT

## 2024-05-20 RX ORDER — ACETAMINOPHEN 325 MG/1
325 TABLET ORAL
Refills: 0 | Status: ACTIVE | OUTPATIENT
Start: 2024-05-20

## 2024-05-20 RX ORDER — CETIRIZINE HYDROCHLORIDE 10 MG/1
10 TABLET, COATED ORAL
Qty: 0 | Refills: 0 | Status: COMPLETED
Start: 2024-05-14

## 2024-05-20 RX ORDER — VEDOLIZUMAB 300 MG/5ML
300 INJECTION, POWDER, LYOPHILIZED, FOR SOLUTION INTRAVENOUS
Qty: 0 | Refills: 0 | Status: COMPLETED
Start: 2024-01-09

## 2024-05-20 RX ADMIN — ACETAMINOPHEN 2 MG: 325 TABLET ORAL at 00:00

## 2024-05-20 NOTE — HISTORY OF PRESENT ILLNESS
[Denies] : Denies [No] : No [Yes] : Yes [Declined] : Declined [TB] : Tuberculosis screening [Hep acute panel] : Hepatitis acute panel [IgA level] : IgA level [Left upper extremity] : Left upper extremity [24g] : 24g [Start Time: ___] : Medication Start Time: [unfilled] [End Time: ___] : Medication End Time: [unfilled] [Medication Name: ___] : Medication Name: [unfilled] [Total Amount Administered: ___] : Total Amount Administered: [unfilled] [IV discontinued. Intact. No signs or symptoms of IV complications noted. Time: ___] : IV discontinued. Intact. No signs or symptoms of IV complications noted. Time: [unfilled] [Patient  instructed to seek medical attention with signs and symptoms of adverse effects] : Patient  instructed to seek medical attention with signs and symptoms of adverse effects [Patient left unit in no acute distress] : Patient left unit in no acute distress [de-identified] : 08:35 am

## 2024-06-11 ENCOUNTER — NON-APPOINTMENT (OUTPATIENT)
Age: 74
End: 2024-06-11

## 2024-07-08 ENCOUNTER — OUTPATIENT (OUTPATIENT)
Dept: OUTPATIENT SERVICES | Facility: HOSPITAL | Age: 74
LOS: 1 days | Discharge: HOME | End: 2024-07-08

## 2024-07-08 DIAGNOSIS — Z86.718 PERSONAL HISTORY OF OTHER VENOUS THROMBOSIS AND EMBOLISM: ICD-10-CM

## 2024-07-08 PROBLEM — Z87.442 HISTORY OF URINARY STONE: Status: RESOLVED | Noted: 2022-06-15 | Resolved: 2024-07-08

## 2024-07-09 ENCOUNTER — RESULT REVIEW (OUTPATIENT)
Age: 74
End: 2024-07-09

## 2024-07-09 ENCOUNTER — APPOINTMENT (OUTPATIENT)
Dept: HEMATOLOGY ONCOLOGY | Facility: CLINIC | Age: 74
End: 2024-07-09
Payer: MEDICARE

## 2024-07-09 DIAGNOSIS — Z87.19 PERSONAL HISTORY OF OTHER DISEASES OF THE DIGESTIVE SYSTEM: ICD-10-CM

## 2024-07-09 DIAGNOSIS — Z87.442 PERSONAL HISTORY OF URINARY CALCULI: ICD-10-CM

## 2024-07-09 DIAGNOSIS — Z86.718 PERSONAL HISTORY OF OTHER VENOUS THROMBOSIS AND EMBOLISM: ICD-10-CM

## 2024-07-09 DIAGNOSIS — Z79.01 LONG TERM (CURRENT) USE OF ANTICOAGULANTS: ICD-10-CM

## 2024-07-09 DIAGNOSIS — Z51.81 ENCOUNTER FOR THERAPEUTIC DRUG LVL MONITORING: ICD-10-CM

## 2024-07-09 DIAGNOSIS — D69.6 THROMBOCYTOPENIA, UNSPECIFIED: ICD-10-CM

## 2024-07-09 LAB
BASOPHILS # BLD AUTO: 0.07 K/UL — SIGNIFICANT CHANGE UP (ref 0–0.2)
BASOPHILS NFR BLD AUTO: 0.9 % — SIGNIFICANT CHANGE UP (ref 0–2)
EOSINOPHIL # BLD AUTO: 0.22 K/UL — SIGNIFICANT CHANGE UP (ref 0–0.5)
EOSINOPHIL NFR BLD AUTO: 2.8 % — SIGNIFICANT CHANGE UP (ref 0–6)
FERRITIN SERPL-MCNC: 110 NG/ML — SIGNIFICANT CHANGE UP (ref 30–400)
HCT VFR BLD CALC: 41.4 % — SIGNIFICANT CHANGE UP (ref 39–50)
HGB BLD-MCNC: 14.2 G/DL — SIGNIFICANT CHANGE UP (ref 13–17)
IMM GRANULOCYTES NFR BLD AUTO: 0.3 % — SIGNIFICANT CHANGE UP (ref 0–0.9)
IRON SATN MFR SERPL: 125 UG/DL — SIGNIFICANT CHANGE UP (ref 45–165)
IRON SATN MFR SERPL: 47 % — SIGNIFICANT CHANGE UP (ref 16–55)
LYMPHOCYTES # BLD AUTO: 1.4 K/UL — SIGNIFICANT CHANGE UP (ref 1–3.3)
LYMPHOCYTES # BLD AUTO: 18 % — SIGNIFICANT CHANGE UP (ref 13–44)
MCHC RBC-ENTMCNC: 32 PG — SIGNIFICANT CHANGE UP (ref 27–34)
MCHC RBC-ENTMCNC: 34.3 GM/DL — SIGNIFICANT CHANGE UP (ref 32–36)
MCV RBC AUTO: 93.2 FL — SIGNIFICANT CHANGE UP (ref 80–100)
MONOCYTES # BLD AUTO: 0.72 K/UL — SIGNIFICANT CHANGE UP (ref 0–0.9)
MONOCYTES NFR BLD AUTO: 9.3 % — SIGNIFICANT CHANGE UP (ref 2–14)
NEUTROPHILS # BLD AUTO: 5.34 K/UL — SIGNIFICANT CHANGE UP (ref 1.8–7.4)
NEUTROPHILS NFR BLD AUTO: 68.7 % — SIGNIFICANT CHANGE UP (ref 43–77)
NRBC # BLD: 0 /100 WBCS — SIGNIFICANT CHANGE UP (ref 0–0)
NRBC BLD-RTO: 0 /100 WBCS — SIGNIFICANT CHANGE UP (ref 0–0)
PLATELET # BLD AUTO: 168 K/UL — SIGNIFICANT CHANGE UP (ref 150–400)
RBC # BLD: 4.44 M/UL — SIGNIFICANT CHANGE UP (ref 4.2–5.8)
RBC # FLD: 13.1 % — SIGNIFICANT CHANGE UP (ref 10.3–14.5)
TIBC SERPL-MCNC: 265 UG/DL — SIGNIFICANT CHANGE UP (ref 220–430)
UIBC SERPL-MCNC: 140 UG/DL — SIGNIFICANT CHANGE UP (ref 110–370)
WBC # BLD: 7.77 K/UL — SIGNIFICANT CHANGE UP (ref 3.8–10.5)
WBC # FLD AUTO: 7.77 K/UL — SIGNIFICANT CHANGE UP (ref 3.8–10.5)

## 2024-07-09 PROCEDURE — G2211 COMPLEX E/M VISIT ADD ON: CPT

## 2024-07-09 PROCEDURE — 99213 OFFICE O/P EST LOW 20 MIN: CPT

## 2024-07-10 DIAGNOSIS — D69.6 THROMBOCYTOPENIA, UNSPECIFIED: ICD-10-CM

## 2024-07-10 LAB — ERYTHROCYTE [SEDIMENTATION RATE] IN BLOOD: 2 MM/HR — SIGNIFICANT CHANGE UP (ref 0–20)

## 2024-07-15 ENCOUNTER — APPOINTMENT (OUTPATIENT)
Dept: RHEUMATOLOGY | Facility: CLINIC | Age: 74
End: 2024-07-15
Payer: MEDICARE

## 2024-07-15 VITALS
HEART RATE: 84 BPM | OXYGEN SATURATION: 98 % | TEMPERATURE: 98 F | DIASTOLIC BLOOD PRESSURE: 77 MMHG | RESPIRATION RATE: 17 BRPM | SYSTOLIC BLOOD PRESSURE: 127 MMHG

## 2024-07-15 VITALS — HEART RATE: 74 BPM | OXYGEN SATURATION: 99 % | SYSTOLIC BLOOD PRESSURE: 110 MMHG | DIASTOLIC BLOOD PRESSURE: 69 MMHG

## 2024-07-15 PROCEDURE — 96365 THER/PROPH/DIAG IV INF INIT: CPT

## 2024-07-15 RX ORDER — ACETAMINOPHEN 325 MG/1
325 TABLET ORAL
Qty: 0 | Refills: 0 | Status: COMPLETED
Start: 2024-05-20

## 2024-07-15 RX ORDER — VEDOLIZUMAB 300 MG/5ML
300 INJECTION, POWDER, LYOPHILIZED, FOR SOLUTION INTRAVENOUS
Qty: 0 | Refills: 0 | Status: COMPLETED
Start: 2024-01-09

## 2024-07-15 RX ORDER — CETIRIZINE HYDROCHLORIDE 10 MG/1
10 TABLET, COATED ORAL
Qty: 0 | Refills: 0 | Status: COMPLETED
Start: 2024-01-09

## 2024-07-22 ENCOUNTER — APPOINTMENT (OUTPATIENT)
Dept: UROLOGY | Facility: CLINIC | Age: 74
End: 2024-07-22
Payer: MEDICARE

## 2024-07-22 DIAGNOSIS — N40.0 BENIGN PROSTATIC HYPERPLASIA WITHOUT LOWER URINARY TRACT SYMPMS: ICD-10-CM

## 2024-07-22 DIAGNOSIS — Z12.5 ENCOUNTER FOR SCREENING FOR MALIGNANT NEOPLASM OF PROSTATE: ICD-10-CM

## 2024-07-22 DIAGNOSIS — N20.9 URINARY CALCULUS, UNSPECIFIED: ICD-10-CM

## 2024-07-22 PROCEDURE — 99212 OFFICE O/P EST SF 10 MIN: CPT

## 2024-07-22 NOTE — PHYSICAL EXAM
[Normal Appearance] : normal appearance [Well Groomed] : well groomed [General Appearance - In No Acute Distress] : no acute distress [Respiration, Rhythm And Depth] : normal respiratory rhythm and effort [Exaggerated Use Of Accessory Muscles For Inspiration] : no accessory muscle use [Abdomen Soft] : soft [Abdomen Tenderness] : non-tender [Costovertebral Angle Tenderness] : no ~M costovertebral angle tenderness [Urethral Meatus] : meatus normal [Urinary Bladder Findings] : the bladder was normal on palpation [Prostate Tenderness] : the prostate was not tender [No Prostate Nodules] : no prostate nodules [Normal Station and Gait] : the gait and station were normal for the patient's age [] : no rash [No Focal Deficits] : no focal deficits [Oriented To Time, Place, And Person] : oriented to person, place, and time [Affect] : the affect was normal [Mood] : the mood was normal

## 2024-07-23 LAB
APPEARANCE: CLEAR
BACTERIA: NEGATIVE /HPF
BILIRUBIN URINE: NEGATIVE
BLOOD URINE: NEGATIVE
CAST: 3 /LPF
COLOR: YELLOW
EPITHELIAL CELLS: 0 /HPF
GLUCOSE QUALITATIVE U: NEGATIVE MG/DL
KETONES URINE: NEGATIVE MG/DL
LEUKOCYTE ESTERASE URINE: NEGATIVE
MICROSCOPIC-UA: NORMAL
NITRITE URINE: NEGATIVE
PH URINE: 6.5
PROTEIN URINE: NORMAL MG/DL
PSA SERPL-MCNC: 1.67 NG/ML
RED BLOOD CELLS URINE: 2 /HPF
SPECIFIC GRAVITY URINE: 1.02
UROBILINOGEN URINE: 0.2 MG/DL
WHITE BLOOD CELLS URINE: 0 /HPF

## 2024-07-24 LAB
BACTERIA UR CULT: NORMAL
URINE CYTOLOGY: NORMAL

## 2024-07-24 NOTE — ASSESSMENT
[FreeTextEntry1] : Labs and Imaging Reviewed.   For BPH, no bothersome LUTS. Will send UA, UC, and Cytology.   For Stones, will obtain repeat RBUS to assess stone burden.  For PSA screening, UMA Benign. Will send PSA.   Patient will RTO in 1 year or sooner if needed.

## 2024-07-24 NOTE — HISTORY OF PRESENT ILLNESS
[FreeTextEntry1] : 74-year-old male presents for follow up for BPH. Reports he is voiding well without  complaints. No currently taking any prostate medications. Last CT (10/04/2023) showed bilateral nonobstructing renal stones, largest in the right kidney 6 mm.

## 2024-08-13 ENCOUNTER — APPOINTMENT (OUTPATIENT)
Dept: UROLOGY | Facility: CLINIC | Age: 74
End: 2024-08-13
Payer: MEDICARE

## 2024-08-13 DIAGNOSIS — N40.0 BENIGN PROSTATIC HYPERPLASIA WITHOUT LOWER URINARY TRACT SYMPMS: ICD-10-CM

## 2024-08-13 DIAGNOSIS — N20.9 URINARY CALCULUS, UNSPECIFIED: ICD-10-CM

## 2024-08-13 PROCEDURE — 99212 OFFICE O/P EST SF 10 MIN: CPT | Mod: 25

## 2024-08-13 PROCEDURE — 76705 ECHO EXAM OF ABDOMEN: CPT

## 2024-08-13 NOTE — HISTORY OF PRESENT ILLNESS
[FreeTextEntry1] : The patient is here today for a renal ultrasound. Ultrasound shows no stones, but cysts, which have been seen in the past.

## 2024-08-13 NOTE — END OF VISIT
[FreeTextEntry3] : All medical record entries made by the Scribe were at my, Dr. Armen Ott's, direction and personally dictated by me on 08/13/2024. I have reviewed the chart and agree that the record accurately reflects my personal performance of the history, physical exam, assessment and plan. I have also personally directed, reviewed, and agreed with the chart.

## 2024-08-13 NOTE — ASSESSMENT
[FreeTextEntry1] : Imp: Normal sonogram without evidence for stones.  Recommendations: Follow up in 1 year or as needed.

## 2024-09-05 DIAGNOSIS — K51.90 ULCERATIVE COLITIS, UNSPECIFIED, W/OUT COMPLICATIONS: ICD-10-CM

## 2024-09-09 ENCOUNTER — APPOINTMENT (OUTPATIENT)
Dept: RHEUMATOLOGY | Facility: CLINIC | Age: 74
End: 2024-09-09
Payer: MEDICARE

## 2024-09-09 VITALS
HEART RATE: 85 BPM | RESPIRATION RATE: 18 BRPM | DIASTOLIC BLOOD PRESSURE: 70 MMHG | SYSTOLIC BLOOD PRESSURE: 130 MMHG | OXYGEN SATURATION: 99 %

## 2024-09-09 PROCEDURE — 96365 THER/PROPH/DIAG IV INF INIT: CPT

## 2024-09-09 RX ORDER — CETIRIZINE HYDROCHLORIDE 10 MG/1
10 TABLET, COATED ORAL
Qty: 0 | Refills: 0 | Status: COMPLETED
Start: 2024-01-09

## 2024-09-09 RX ORDER — VEDOLIZUMAB 300 MG/5ML
300 INJECTION, POWDER, LYOPHILIZED, FOR SOLUTION INTRAVENOUS
Qty: 0 | Refills: 0 | Status: COMPLETED
Start: 2024-01-09

## 2024-09-09 RX ORDER — ACETAMINOPHEN 325 MG/1
325 TABLET ORAL
Qty: 0 | Refills: 0 | Status: COMPLETED
Start: 2024-05-20

## 2024-09-09 NOTE — HISTORY OF PRESENT ILLNESS
[N/A] : N/A [Denies] : Denies [No] : No [Yes] : Yes [Declined] : Declined [TB] : Tuberculosis screening [Hep acute panel] : Hepatitis acute panel [Left upper extremity] : Left upper extremity [24g] : 24g [Start Time: ___] : Medication Start Time: [unfilled] [End Time: ___] : Medication End Time: [unfilled] [Medication Name: ___] : Medication Name: [unfilled] [Total Amount Administered: ___] : Total Amount Administered: [unfilled] [IV discontinued. Intact. No signs or symptoms of IV complications noted. Time: ___] : IV discontinued. Intact. No signs or symptoms of IV complications noted. Time: [unfilled] [Patient  instructed to seek medical attention with signs and symptoms of adverse effects] : Patient  instructed to seek medical attention with signs and symptoms of adverse effects [Patient left unit in no acute distress] : Patient left unit in no acute distress [Medications administered as ordered and tolerated well.] : Medications administered as ordered and tolerated well. [de-identified] : 08:30 AM

## 2024-09-30 NOTE — ED ADULT NURSE NOTE - NS PRO AD NO ADVANCE DIRECTIVE
No. CHIARA screening performed.  STOP BANG Legend: 0-2 = LOW Risk; 3-4 = INTERMEDIATE Risk; 5-8 = HIGH Risk No Yes

## 2024-10-02 ENCOUNTER — NON-APPOINTMENT (OUTPATIENT)
Age: 74
End: 2024-10-02

## 2024-10-09 ENCOUNTER — APPOINTMENT (OUTPATIENT)
Dept: GASTROENTEROLOGY | Facility: AMBULATORY MEDICAL SERVICES | Age: 74
End: 2024-10-09
Payer: MEDICARE

## 2024-10-09 PROCEDURE — 45385 COLONOSCOPY W/LESION REMOVAL: CPT

## 2024-10-15 ENCOUNTER — NON-APPOINTMENT (OUTPATIENT)
Age: 74
End: 2024-10-15

## 2024-11-07 ENCOUNTER — APPOINTMENT (OUTPATIENT)
Dept: RHEUMATOLOGY | Facility: CLINIC | Age: 74
End: 2024-11-07

## 2024-11-13 ENCOUNTER — APPOINTMENT (OUTPATIENT)
Dept: RHEUMATOLOGY | Facility: CLINIC | Age: 74
End: 2024-11-13
Payer: MEDICARE

## 2024-11-13 VITALS
TEMPERATURE: 97.7 F | HEART RATE: 87 BPM | DIASTOLIC BLOOD PRESSURE: 79 MMHG | RESPIRATION RATE: 18 BRPM | SYSTOLIC BLOOD PRESSURE: 135 MMHG | OXYGEN SATURATION: 99 %

## 2024-11-13 PROCEDURE — 96365 THER/PROPH/DIAG IV INF INIT: CPT

## 2024-11-13 RX ORDER — ACETAMINOPHEN 325 MG/1
325 TABLET ORAL
Qty: 0 | Refills: 0 | Status: COMPLETED
Start: 2024-05-20

## 2024-11-13 RX ORDER — VEDOLIZUMAB 300 MG/5ML
300 INJECTION, POWDER, LYOPHILIZED, FOR SOLUTION INTRAVENOUS
Qty: 0 | Refills: 0 | Status: COMPLETED
Start: 2024-01-09

## 2024-11-13 RX ORDER — CETIRIZINE HYDROCHLORIDE 10 MG/1
10 TABLET, COATED ORAL
Qty: 0 | Refills: 0 | Status: COMPLETED
Start: 2024-01-09

## 2025-01-09 RX ORDER — VEDOLIZUMAB 300 MG/5ML
300 INJECTION, POWDER, LYOPHILIZED, FOR SOLUTION INTRAVENOUS
Refills: 0 | Status: ACTIVE | OUTPATIENT
Start: 2025-01-09 | End: 1900-01-01

## 2025-01-15 RX ORDER — CETIRIZINE HYDROCHLORIDE 10 MG/1
10 TABLET, FILM COATED ORAL
Refills: 0 | Status: ACTIVE | OUTPATIENT
Start: 2025-01-15 | End: 1900-01-01

## 2025-01-16 ENCOUNTER — APPOINTMENT (OUTPATIENT)
Dept: RHEUMATOLOGY | Facility: CLINIC | Age: 75
End: 2025-01-16
Payer: MEDICARE

## 2025-01-16 VITALS
RESPIRATION RATE: 16 BRPM | DIASTOLIC BLOOD PRESSURE: 83 MMHG | OXYGEN SATURATION: 98 % | HEART RATE: 88 BPM | TEMPERATURE: 98 F | SYSTOLIC BLOOD PRESSURE: 147 MMHG

## 2025-01-16 VITALS
TEMPERATURE: 98 F | OXYGEN SATURATION: 98 % | SYSTOLIC BLOOD PRESSURE: 138 MMHG | HEART RATE: 98 BPM | RESPIRATION RATE: 17 BRPM | DIASTOLIC BLOOD PRESSURE: 86 MMHG

## 2025-01-16 PROCEDURE — 96365 THER/PROPH/DIAG IV INF INIT: CPT

## 2025-01-16 RX ORDER — VEDOLIZUMAB 300 MG/5ML
300 INJECTION, POWDER, LYOPHILIZED, FOR SOLUTION INTRAVENOUS
Qty: 0 | Refills: 0 | Status: COMPLETED
Start: 2025-01-09

## 2025-01-16 RX ORDER — CETIRIZINE HYDROCHLORIDE 10 MG/1
10 TABLET, FILM COATED ORAL
Qty: 0 | Refills: 0 | Status: COMPLETED
Start: 2025-01-15

## 2025-01-16 RX ORDER — ACETAMINOPHEN 325 MG/1
325 TABLET ORAL
Qty: 0 | Refills: 0 | Status: COMPLETED
Start: 2024-05-20

## 2025-03-05 ENCOUNTER — RESULT REVIEW (OUTPATIENT)
Age: 75
End: 2025-03-05

## 2025-03-05 ENCOUNTER — NON-APPOINTMENT (OUTPATIENT)
Age: 75
End: 2025-03-05

## 2025-03-05 ENCOUNTER — OUTPATIENT (OUTPATIENT)
Dept: OUTPATIENT SERVICES | Facility: HOSPITAL | Age: 75
LOS: 1 days | Discharge: HOME | End: 2025-03-05

## 2025-03-05 ENCOUNTER — APPOINTMENT (OUTPATIENT)
Dept: HEMATOLOGY ONCOLOGY | Facility: CLINIC | Age: 75
End: 2025-03-05

## 2025-03-05 ENCOUNTER — APPOINTMENT (OUTPATIENT)
Dept: HEMATOLOGY ONCOLOGY | Facility: CLINIC | Age: 75
End: 2025-03-05
Payer: MEDICARE

## 2025-03-05 DIAGNOSIS — D69.6 THROMBOCYTOPENIA, UNSPECIFIED: ICD-10-CM

## 2025-03-05 DIAGNOSIS — Z79.01 LONG TERM (CURRENT) USE OF ANTICOAGULANTS: ICD-10-CM

## 2025-03-05 DIAGNOSIS — Z51.81 ENCOUNTER FOR THERAPEUTIC DRUG LVL MONITORING: ICD-10-CM

## 2025-03-05 LAB
BASOPHILS # BLD AUTO: 0.06 K/UL — SIGNIFICANT CHANGE UP (ref 0–0.2)
BASOPHILS NFR BLD AUTO: 0.9 % — SIGNIFICANT CHANGE UP (ref 0–2)
EOSINOPHIL # BLD AUTO: 0.13 K/UL — SIGNIFICANT CHANGE UP (ref 0–0.5)
EOSINOPHIL NFR BLD AUTO: 2 % — SIGNIFICANT CHANGE UP (ref 0–6)
HCT VFR BLD CALC: 42 % — SIGNIFICANT CHANGE UP (ref 39–50)
HGB BLD-MCNC: 14 G/DL — SIGNIFICANT CHANGE UP (ref 13–17)
IMM GRANULOCYTES NFR BLD AUTO: 0.3 % — SIGNIFICANT CHANGE UP (ref 0–0.9)
LYMPHOCYTES # BLD AUTO: 1.4 K/UL — SIGNIFICANT CHANGE UP (ref 1–3.3)
LYMPHOCYTES # BLD AUTO: 21.5 % — SIGNIFICANT CHANGE UP (ref 13–44)
MCHC RBC-ENTMCNC: 31.5 PG — SIGNIFICANT CHANGE UP (ref 27–34)
MCHC RBC-ENTMCNC: 33.3 G/DL — SIGNIFICANT CHANGE UP (ref 32–36)
MCV RBC AUTO: 94.6 FL — SIGNIFICANT CHANGE UP (ref 80–100)
MONOCYTES # BLD AUTO: 0.66 K/UL — SIGNIFICANT CHANGE UP (ref 0–0.9)
MONOCYTES NFR BLD AUTO: 10.1 % — SIGNIFICANT CHANGE UP (ref 2–14)
NEUTROPHILS # BLD AUTO: 4.25 K/UL — SIGNIFICANT CHANGE UP (ref 1.8–7.4)
NEUTROPHILS NFR BLD AUTO: 65.2 % — SIGNIFICANT CHANGE UP (ref 43–77)
NRBC BLD AUTO-RTO: 0 /100 WBCS — SIGNIFICANT CHANGE UP (ref 0–0)
PLATELET # BLD AUTO: 143 K/UL — LOW (ref 150–400)
RBC # BLD: 4.44 M/UL — SIGNIFICANT CHANGE UP (ref 4.2–5.8)
RBC # FLD: 13.5 % — SIGNIFICANT CHANGE UP (ref 10.3–14.5)
WBC # BLD: 6.52 K/UL — SIGNIFICANT CHANGE UP (ref 3.8–10.5)
WBC # FLD AUTO: 6.52 K/UL — SIGNIFICANT CHANGE UP (ref 3.8–10.5)

## 2025-03-05 PROCEDURE — G2211 COMPLEX E/M VISIT ADD ON: CPT

## 2025-03-05 PROCEDURE — 99213 OFFICE O/P EST LOW 20 MIN: CPT

## 2025-03-06 DIAGNOSIS — Z51.81 ENCOUNTER FOR THERAPEUTIC DRUG LEVEL MONITORING: ICD-10-CM

## 2025-03-06 LAB
ERYTHROCYTE [SEDIMENTATION RATE] IN BLOOD BY WESTERGREN METHOD: 5 MM/HR
FERRITIN SERPL-MCNC: 100 NG/ML
IRON SATN MFR SERPL: 36 %
IRON SERPL-MCNC: 90 UG/DL
TIBC SERPL-MCNC: 252 UG/DL
UIBC SERPL-MCNC: 161 UG/DL

## 2025-03-07 NOTE — PATIENT PROFILE ADULT - LONGEST PERIOD TOBACCO-FREE
Problem: Safety - Adult  Goal: Free from fall injury  3/7/2025 1653 by Tiffany Kimball RN  Outcome: Progressing  3/7/2025 0504 by Sundeep Dia RN  Outcome: Progressing     Problem: Discharge Planning  Goal: Discharge to home or other facility with appropriate resources  3/7/2025 1653 by Tiffany Kimball RN  Outcome: Progressing  3/7/2025 0504 by Sundeep Dia RN  Outcome: Progressing     Problem: Pain  Goal: Verbalizes/displays adequate comfort level or baseline comfort level  3/7/2025 1653 by Tiffany Kimball RN  Outcome: Progressing  3/7/2025 0504 by Sundeep Dia RN  Outcome: Progressing     Problem: Skin/Tissue Integrity  Goal: Skin integrity remains intact  Description: 1.  Monitor for areas of redness and/or skin breakdown  2.  Assess vascular access sites hourly  3.  Every 4-6 hours minimum:  Change oxygen saturation probe site  4.  Every 4-6 hours:  If on nasal continuous positive airway pressure, respiratory therapy assess nares and determine need for appliance change or resting period  3/7/2025 1653 by Tiffany Kimball RN  Outcome: Progressing  3/7/2025 0504 by Sundeep Dia RN  Outcome: Progressing  Flowsheets (Taken 3/6/2025 2200)  Skin Integrity Remains Intact:   Assess vascular access sites hourly   Monitor for areas of redness and/or skin breakdown   Every 4-6 hours minimum: Change oxygen saturation probe site     Problem: ABCDS Injury Assessment  Goal: Absence of physical injury  3/7/2025 1653 by Tiffany Kimball RN  Outcome: Progressing  3/7/2025 0504 by Sundeep Dia RN  Outcome: Progressing     Problem: Physical Therapy - Adult  Goal: By Discharge: Performs mobility at highest level of function for planned discharge setting.  See evaluation for individualized goals.  Description: FUNCTIONAL STATUS PRIOR TO ADMISSION: Patient was independent and active without use of DME.    HOME SUPPORT PRIOR TO ADMISSION: The patient lived with his very supportive wife but did not require  40 yrs

## 2025-03-13 ENCOUNTER — APPOINTMENT (OUTPATIENT)
Dept: RHEUMATOLOGY | Facility: CLINIC | Age: 75
End: 2025-03-13
Payer: MEDICARE

## 2025-03-13 VITALS
HEART RATE: 84 BPM | RESPIRATION RATE: 17 BRPM | OXYGEN SATURATION: 98 % | TEMPERATURE: 98 F | DIASTOLIC BLOOD PRESSURE: 83 MMHG | SYSTOLIC BLOOD PRESSURE: 142 MMHG

## 2025-03-13 PROCEDURE — 96365 THER/PROPH/DIAG IV INF INIT: CPT

## 2025-03-13 RX ORDER — ACETAMINOPHEN 325 MG/1
325 TABLET ORAL
Qty: 0 | Refills: 0 | Status: COMPLETED
Start: 2024-05-20

## 2025-03-13 RX ORDER — CETIRIZINE HYDROCHLORIDE 10 MG/1
10 TABLET, FILM COATED ORAL
Qty: 0 | Refills: 0 | Status: COMPLETED
Start: 2025-01-15

## 2025-03-13 RX ORDER — VEDOLIZUMAB 300 MG/5ML
300 INJECTION, POWDER, LYOPHILIZED, FOR SOLUTION INTRAVENOUS
Qty: 0 | Refills: 0 | Status: COMPLETED
Start: 2025-01-09

## 2025-04-08 ENCOUNTER — APPOINTMENT (OUTPATIENT)
Dept: GASTROENTEROLOGY | Facility: CLINIC | Age: 75
End: 2025-04-08

## 2025-04-08 VITALS
HEART RATE: 104 BPM | WEIGHT: 170 LBS | DIASTOLIC BLOOD PRESSURE: 68 MMHG | HEIGHT: 78 IN | OXYGEN SATURATION: 97 % | BODY MASS INDEX: 19.67 KG/M2 | SYSTOLIC BLOOD PRESSURE: 121 MMHG

## 2025-04-08 DIAGNOSIS — R63.4 ABNORMAL WEIGHT LOSS: ICD-10-CM

## 2025-04-08 DIAGNOSIS — K51.90 ULCERATIVE COLITIS, UNSPECIFIED, W/OUT COMPLICATIONS: ICD-10-CM

## 2025-04-08 DIAGNOSIS — R63.0 ANOREXIA: ICD-10-CM

## 2025-04-08 PROCEDURE — 99214 OFFICE O/P EST MOD 30 MIN: CPT

## 2025-04-08 PROCEDURE — G2211 COMPLEX E/M VISIT ADD ON: CPT

## 2025-04-08 RX ORDER — MEGESTROL ACETATE 125 MG/ML
625 SUSPENSION ORAL DAILY
Qty: 1 | Refills: 2 | Status: ACTIVE | COMMUNITY
Start: 2025-04-08 | End: 1900-01-01

## 2025-05-08 ENCOUNTER — APPOINTMENT (OUTPATIENT)
Dept: RHEUMATOLOGY | Facility: CLINIC | Age: 75
End: 2025-05-08
Payer: MEDICARE

## 2025-05-08 VITALS
SYSTOLIC BLOOD PRESSURE: 109 MMHG | DIASTOLIC BLOOD PRESSURE: 70 MMHG | OXYGEN SATURATION: 98 % | TEMPERATURE: 98 F | HEART RATE: 87 BPM

## 2025-05-08 PROCEDURE — 96365 THER/PROPH/DIAG IV INF INIT: CPT

## 2025-05-08 RX ORDER — CETIRIZINE HYDROCHLORIDE 10 MG/1
10 TABLET, FILM COATED ORAL
Qty: 0 | Refills: 0 | Status: COMPLETED
Start: 2025-01-15

## 2025-05-08 RX ORDER — VEDOLIZUMAB 300 MG/5ML
300 INJECTION, POWDER, LYOPHILIZED, FOR SOLUTION INTRAVENOUS
Qty: 0 | Refills: 0 | Status: COMPLETED
Start: 2025-01-09

## 2025-05-08 RX ORDER — ACETAMINOPHEN 325 MG/1
325 TABLET ORAL
Qty: 0 | Refills: 0 | Status: COMPLETED
Start: 2024-05-20

## 2025-05-12 ENCOUNTER — APPOINTMENT (OUTPATIENT)
Dept: NUCLEAR MEDICINE | Facility: HOSPITAL | Age: 75
End: 2025-05-12
Payer: MEDICARE

## 2025-05-12 ENCOUNTER — OUTPATIENT (OUTPATIENT)
Dept: OUTPATIENT SERVICES | Facility: HOSPITAL | Age: 75
LOS: 1 days | End: 2025-05-12

## 2025-05-12 DIAGNOSIS — R63.4 ABNORMAL WEIGHT LOSS: ICD-10-CM

## 2025-05-12 PROCEDURE — 78264 GASTRIC EMPTYING IMG STUDY: CPT | Mod: 26

## 2025-05-22 ENCOUNTER — RX RENEWAL (OUTPATIENT)
Age: 75
End: 2025-05-22

## 2025-07-03 ENCOUNTER — APPOINTMENT (OUTPATIENT)
Dept: RHEUMATOLOGY | Facility: CLINIC | Age: 75
End: 2025-07-03
Payer: MEDICARE

## 2025-07-03 ENCOUNTER — OUTPATIENT (OUTPATIENT)
Dept: OUTPATIENT SERVICES | Facility: HOSPITAL | Age: 75
LOS: 1 days | Discharge: HOME | End: 2025-07-03

## 2025-07-03 VITALS
HEART RATE: 80 BPM | RESPIRATION RATE: 18 BRPM | SYSTOLIC BLOOD PRESSURE: 135 MMHG | OXYGEN SATURATION: 93 % | DIASTOLIC BLOOD PRESSURE: 73 MMHG

## 2025-07-03 VITALS
OXYGEN SATURATION: 97 % | DIASTOLIC BLOOD PRESSURE: 77 MMHG | SYSTOLIC BLOOD PRESSURE: 139 MMHG | RESPIRATION RATE: 18 BRPM | HEART RATE: 88 BPM | TEMPERATURE: 97 F

## 2025-07-03 DIAGNOSIS — D69.6 THROMBOCYTOPENIA, UNSPECIFIED: ICD-10-CM

## 2025-07-03 DIAGNOSIS — Z51.81 ENCOUNTER FOR THERAPEUTIC DRUG LEVEL MONITORING: ICD-10-CM

## 2025-07-03 PROCEDURE — 96365 THER/PROPH/DIAG IV INF INIT: CPT

## 2025-07-03 RX ORDER — VEDOLIZUMAB 300 MG/5ML
300 INJECTION, POWDER, LYOPHILIZED, FOR SOLUTION INTRAVENOUS
Qty: 0 | Refills: 0 | Status: COMPLETED
Start: 2025-01-09

## 2025-07-03 RX ORDER — CETIRIZINE HYDROCHLORIDE 10 MG/1
10 TABLET, FILM COATED ORAL
Qty: 0 | Refills: 0 | Status: COMPLETED
Start: 2025-01-15

## 2025-07-07 PROBLEM — Z79.52 CURRENT CHRONIC USE OF SYSTEMIC STEROIDS: Status: RESOLVED | Noted: 2021-06-08 | Resolved: 2025-07-07

## 2025-07-08 ENCOUNTER — APPOINTMENT (OUTPATIENT)
Dept: HEMATOLOGY ONCOLOGY | Facility: CLINIC | Age: 75
End: 2025-07-08
Payer: MEDICARE

## 2025-07-08 ENCOUNTER — RESULT REVIEW (OUTPATIENT)
Age: 75
End: 2025-07-08

## 2025-07-08 VITALS
BODY MASS INDEX: 19.65 KG/M2 | WEIGHT: 170 LBS | TEMPERATURE: 97.1 F | HEART RATE: 92 BPM | DIASTOLIC BLOOD PRESSURE: 76 MMHG | SYSTOLIC BLOOD PRESSURE: 146 MMHG | OXYGEN SATURATION: 97 %

## 2025-07-08 DIAGNOSIS — K51.90 ULCERATIVE COLITIS, UNSPECIFIED, WITHOUT COMPLICATIONS: ICD-10-CM

## 2025-07-08 PROBLEM — Z86.718 HISTORY OF DEEP VENOUS THROMBOSIS: Status: RESOLVED | Noted: 2018-03-09 | Resolved: 2025-07-08

## 2025-07-08 LAB
BASOPHILS # BLD AUTO: 0.05 K/UL — SIGNIFICANT CHANGE UP (ref 0–0.2)
BASOPHILS NFR BLD AUTO: 0.6 % — SIGNIFICANT CHANGE UP (ref 0–2)
EOSINOPHIL # BLD AUTO: 0.2 K/UL — SIGNIFICANT CHANGE UP (ref 0–0.5)
EOSINOPHIL NFR BLD AUTO: 2.5 % — SIGNIFICANT CHANGE UP (ref 0–6)
FERRITIN SERPL-MCNC: 129 NG/ML
HCT VFR BLD CALC: 44.3 % — SIGNIFICANT CHANGE UP (ref 39–50)
HGB BLD-MCNC: 15.4 G/DL — SIGNIFICANT CHANGE UP (ref 13–17)
IMM GRANULOCYTES # BLD AUTO: 0.04 K/UL — SIGNIFICANT CHANGE UP (ref 0–0.07)
IMM GRANULOCYTES NFR BLD AUTO: 0.5 % — SIGNIFICANT CHANGE UP (ref 0–0.9)
IRON SATN MFR SERPL: 56 %
IRON SERPL-MCNC: 150 UG/DL
LYMPHOCYTES # BLD AUTO: 1.33 K/UL — SIGNIFICANT CHANGE UP (ref 1–3.3)
LYMPHOCYTES NFR BLD AUTO: 16.8 % — SIGNIFICANT CHANGE UP (ref 13–44)
MCHC RBC-ENTMCNC: 33.1 PG — SIGNIFICANT CHANGE UP (ref 27–34)
MCHC RBC-ENTMCNC: 34.8 G/DL — SIGNIFICANT CHANGE UP (ref 32–36)
MCV RBC AUTO: 95.3 FL — SIGNIFICANT CHANGE UP (ref 80–100)
MONOCYTES # BLD AUTO: 0.75 K/UL — SIGNIFICANT CHANGE UP (ref 0–0.9)
MONOCYTES NFR BLD AUTO: 9.5 % — SIGNIFICANT CHANGE UP (ref 2–14)
NEUTROPHILS # BLD AUTO: 5.54 K/UL — SIGNIFICANT CHANGE UP (ref 1.8–7.4)
NEUTROPHILS NFR BLD AUTO: 70.1 % — SIGNIFICANT CHANGE UP (ref 43–77)
NRBC # BLD AUTO: 0 K/UL — SIGNIFICANT CHANGE UP (ref 0–0)
NRBC # FLD: 0 K/UL — SIGNIFICANT CHANGE UP (ref 0–0)
NRBC BLD AUTO-RTO: 0 /100 WBCS — SIGNIFICANT CHANGE UP (ref 0–0)
PLATELET # BLD AUTO: 158 K/UL — SIGNIFICANT CHANGE UP (ref 150–400)
PMV BLD: 9.1 FL — SIGNIFICANT CHANGE UP (ref 7–13)
RBC # BLD: 4.65 M/UL — SIGNIFICANT CHANGE UP (ref 4.2–5.8)
RBC # FLD: 14.3 % — SIGNIFICANT CHANGE UP (ref 10.3–14.5)
TIBC SERPL-MCNC: 270 UG/DL
UIBC SERPL-MCNC: 120 UG/DL
WBC # BLD: 7.91 K/UL — SIGNIFICANT CHANGE UP (ref 3.8–10.5)
WBC # FLD AUTO: 7.91 K/UL — SIGNIFICANT CHANGE UP (ref 3.8–10.5)

## 2025-07-08 PROCEDURE — 99213 OFFICE O/P EST LOW 20 MIN: CPT

## 2025-07-08 PROCEDURE — G2211 COMPLEX E/M VISIT ADD ON: CPT

## 2025-07-09 LAB — ERYTHROCYTE [SEDIMENTATION RATE] IN BLOOD BY WESTERGREN METHOD: 11 MM/HR

## 2025-07-20 ENCOUNTER — NON-APPOINTMENT (OUTPATIENT)
Age: 75
End: 2025-07-20

## 2025-07-29 DIAGNOSIS — R79.1 ABNORMAL COAGULATION PROFILE: ICD-10-CM

## 2025-07-30 ENCOUNTER — APPOINTMENT (OUTPATIENT)
Dept: HEMATOLOGY ONCOLOGY | Facility: CLINIC | Age: 75
End: 2025-07-30

## 2025-07-30 ENCOUNTER — RESULT REVIEW (OUTPATIENT)
Age: 75
End: 2025-07-30

## 2025-07-30 LAB
BASOPHILS # BLD AUTO: 0.06 K/UL — SIGNIFICANT CHANGE UP (ref 0–0.2)
BASOPHILS NFR BLD AUTO: 0.9 % — SIGNIFICANT CHANGE UP (ref 0–2)
EOSINOPHIL # BLD AUTO: 0.18 K/UL — SIGNIFICANT CHANGE UP (ref 0–0.5)
EOSINOPHIL NFR BLD AUTO: 2.6 % — SIGNIFICANT CHANGE UP (ref 0–6)
HCT VFR BLD CALC: 44.4 % — SIGNIFICANT CHANGE UP (ref 39–50)
HGB BLD-MCNC: 14.8 G/DL — SIGNIFICANT CHANGE UP (ref 13–17)
IMM GRANULOCYTES # BLD AUTO: 0.04 K/UL — SIGNIFICANT CHANGE UP (ref 0–0.07)
IMM GRANULOCYTES NFR BLD AUTO: 0.6 % — SIGNIFICANT CHANGE UP (ref 0–0.9)
LYMPHOCYTES # BLD AUTO: 1.6 K/UL — SIGNIFICANT CHANGE UP (ref 1–3.3)
LYMPHOCYTES NFR BLD AUTO: 22.8 % — SIGNIFICANT CHANGE UP (ref 13–44)
MCHC RBC-ENTMCNC: 32 PG — SIGNIFICANT CHANGE UP (ref 27–34)
MCHC RBC-ENTMCNC: 33.3 G/DL — SIGNIFICANT CHANGE UP (ref 32–36)
MCV RBC AUTO: 95.9 FL — SIGNIFICANT CHANGE UP (ref 80–100)
MONOCYTES # BLD AUTO: 0.72 K/UL — SIGNIFICANT CHANGE UP (ref 0–0.9)
MONOCYTES NFR BLD AUTO: 10.3 % — SIGNIFICANT CHANGE UP (ref 2–14)
NEUTROPHILS # BLD AUTO: 4.42 K/UL — SIGNIFICANT CHANGE UP (ref 1.8–7.4)
NEUTROPHILS NFR BLD AUTO: 62.8 % — SIGNIFICANT CHANGE UP (ref 43–77)
NRBC # BLD AUTO: 0 K/UL — SIGNIFICANT CHANGE UP (ref 0–0)
NRBC # FLD: 0 K/UL — SIGNIFICANT CHANGE UP (ref 0–0)
NRBC BLD AUTO-RTO: 0 /100 WBCS — SIGNIFICANT CHANGE UP (ref 0–0)
PLATELET # BLD AUTO: 126 K/UL — LOW (ref 150–400)
PMV BLD: 8.6 FL — SIGNIFICANT CHANGE UP (ref 7–13)
RBC # BLD: 4.63 M/UL — SIGNIFICANT CHANGE UP (ref 4.2–5.8)
RBC # FLD: 14.5 % — SIGNIFICANT CHANGE UP (ref 10.3–14.5)
WBC # BLD: 7.02 K/UL — SIGNIFICANT CHANGE UP (ref 3.8–10.5)
WBC # FLD AUTO: 7.02 K/UL — SIGNIFICANT CHANGE UP (ref 3.8–10.5)

## 2025-07-31 LAB
ERYTHROCYTE [SEDIMENTATION RATE] IN BLOOD BY WESTERGREN METHOD: 9 MM/HR
FERRITIN SERPL-MCNC: 239 NG/ML
INR PPP: 1.01 RATIO
IRON SATN MFR SERPL: 48 %
IRON SERPL-MCNC: 120 UG/DL
PT BLD: 11.9 SEC
TIBC SERPL-MCNC: 252 UG/DL
UIBC SERPL-MCNC: 132 UG/DL

## 2025-08-22 ENCOUNTER — LABORATORY RESULT (OUTPATIENT)
Age: 75
End: 2025-08-22

## 2025-08-22 DIAGNOSIS — K51.90 ULCERATIVE COLITIS, UNSPECIFIED, W/OUT COMPLICATIONS: ICD-10-CM

## 2025-08-28 ENCOUNTER — APPOINTMENT (OUTPATIENT)
Dept: RHEUMATOLOGY | Facility: CLINIC | Age: 75
End: 2025-08-28
Payer: MEDICARE

## 2025-08-28 VITALS
TEMPERATURE: 97 F | DIASTOLIC BLOOD PRESSURE: 67 MMHG | HEART RATE: 91 BPM | SYSTOLIC BLOOD PRESSURE: 112 MMHG | RESPIRATION RATE: 17 BRPM | OXYGEN SATURATION: 98 %

## 2025-08-28 PROCEDURE — 96365 THER/PROPH/DIAG IV INF INIT: CPT

## 2025-08-28 RX ORDER — VEDOLIZUMAB 300 MG/5ML
300 INJECTION, POWDER, LYOPHILIZED, FOR SOLUTION INTRAVENOUS
Qty: 0 | Refills: 0 | Status: COMPLETED
Start: 2025-01-09

## 2025-08-28 RX ORDER — CETIRIZINE HYDROCHLORIDE 10 MG/1
10 TABLET, FILM COATED ORAL
Qty: 0 | Refills: 0 | Status: COMPLETED
Start: 2025-01-15

## 2025-08-29 LAB
ALBUMIN SERPL ELPH-MCNC: 4.3 G/DL
ALP BLD-CCNC: 52 U/L
ALT SERPL-CCNC: 24 U/L
ANION GAP SERPL CALC-SCNC: 11 MMOL/L
AST SERPL-CCNC: 29 U/L
BILIRUB SERPL-MCNC: 0.6 MG/DL
BUN SERPL-MCNC: 19 MG/DL
CALCIUM SERPL-MCNC: 9.7 MG/DL
CHLORIDE SERPL-SCNC: 104 MMOL/L
CO2 SERPL-SCNC: 26 MMOL/L
CREAT SERPL-MCNC: 0.91 MG/DL
EGFRCR SERPLBLD CKD-EPI 2021: 87 ML/MIN/1.73M2
HBV CORE IGG+IGM SER QL: NONREACTIVE
HBV SURFACE AB SER QL: NONREACTIVE
HBV SURFACE AG SER QL: NONREACTIVE
HCT VFR BLD CALC: 39.7 %
HGB BLD-MCNC: 13.2 G/DL
MCHC RBC-ENTMCNC: 33.2 G/DL
MCHC RBC-ENTMCNC: 33.2 PG
MCV RBC AUTO: 99.7 FL
PLATELET # BLD AUTO: 152 K/UL
POTASSIUM SERPL-SCNC: 4.1 MMOL/L
PROT SERPL-MCNC: 6.1 G/DL
RBC # BLD: 3.98 M/UL
RBC # FLD: 13.8 %
SODIUM SERPL-SCNC: 141 MMOL/L
WBC # FLD AUTO: 6.22 K/UL

## 2025-08-30 LAB — CELIAC PANEL: NORMAL

## 2025-09-09 ENCOUNTER — EMERGENCY (EMERGENCY)
Facility: HOSPITAL | Age: 75
LOS: 0 days | Discharge: HOME | End: 2025-09-09
Attending: EMERGENCY MEDICINE
Payer: MEDICARE

## 2025-09-09 VITALS
HEART RATE: 96 BPM | WEIGHT: 173.94 LBS | TEMPERATURE: 98 F | SYSTOLIC BLOOD PRESSURE: 133 MMHG | RESPIRATION RATE: 18 BRPM | OXYGEN SATURATION: 100 % | DIASTOLIC BLOOD PRESSURE: 65 MMHG

## 2025-09-09 DIAGNOSIS — L53.9 ERYTHEMATOUS CONDITION, UNSPECIFIED: ICD-10-CM

## 2025-09-09 DIAGNOSIS — R21 RASH AND OTHER NONSPECIFIC SKIN ERUPTION: ICD-10-CM

## 2025-09-09 PROCEDURE — 99282 EMERGENCY DEPT VISIT SF MDM: CPT

## 2025-09-09 PROCEDURE — 99283 EMERGENCY DEPT VISIT LOW MDM: CPT

## (undated) DEVICE — CATH IV SAFE BC 22G X 1" (BLUE)

## (undated) DEVICE — PACK IV START WITH CHG

## (undated) DEVICE — TUBING IV SET GRAVITY 3Y 100" MACRO

## (undated) DEVICE — FOLEY HOLDER STATLOCK 2 WAY ADULT

## (undated) DEVICE — SUCTION YANKAUER NO CONTROL VENT

## (undated) DEVICE — TUBING SUCTION CONN 6FT STERILE

## (undated) DEVICE — CATH IV SAFE BC 20G X 1.16" (PINK)

## (undated) DEVICE — BALLOON US ENDO

## (undated) DEVICE — TUBING SUCTION 20FT

## (undated) DEVICE — SYR ALLIANCE II INFLATION 60ML

## (undated) DEVICE — SOL INJ NS 0.9% 500ML 2 PORT

## (undated) DEVICE — SENSOR O2 FINGER ADULT

## (undated) DEVICE — BITE BLOCK ADULT 20 X 27MM (GREEN)

## (undated) DEVICE — BIOPSY FORCEP RADIAL JAW 4 STANDARD WITH NEEDLE